# Patient Record
Sex: FEMALE | Race: WHITE | NOT HISPANIC OR LATINO | Employment: FULL TIME | URBAN - METROPOLITAN AREA
[De-identification: names, ages, dates, MRNs, and addresses within clinical notes are randomized per-mention and may not be internally consistent; named-entity substitution may affect disease eponyms.]

---

## 2017-10-17 ENCOUNTER — LAB REQUISITION (OUTPATIENT)
Dept: LAB | Facility: HOSPITAL | Age: 27
End: 2017-10-17
Payer: COMMERCIAL

## 2017-10-17 ENCOUNTER — ALLSCRIPTS OFFICE VISIT (OUTPATIENT)
Dept: OTHER | Facility: OTHER | Age: 27
End: 2017-10-17

## 2017-10-17 DIAGNOSIS — Z11.3 ENCOUNTER FOR SCREENING FOR INFECTIONS WITH PREDOMINANTLY SEXUAL MODE OF TRANSMISSION: ICD-10-CM

## 2017-10-17 PROCEDURE — 87491 CHLMYD TRACH DNA AMP PROBE: CPT | Performed by: OBSTETRICS & GYNECOLOGY

## 2017-10-17 PROCEDURE — 87591 N.GONORRHOEAE DNA AMP PROB: CPT | Performed by: OBSTETRICS & GYNECOLOGY

## 2017-10-17 PROCEDURE — 87624 HPV HI-RISK TYP POOLED RSLT: CPT | Performed by: OBSTETRICS & GYNECOLOGY

## 2017-10-17 PROCEDURE — G0145 SCR C/V CYTO,THINLAYER,RESCR: HCPCS | Performed by: OBSTETRICS & GYNECOLOGY

## 2017-10-18 NOTE — PROGRESS NOTES
Assessment  1  Screening for STDs (sexually transmitted diseases) (V74 5) (Z11 3)   2  Birth control counseling (V25 09) (Z30 09)   3  Mild cervical dysplasia (622 11) (N87 0)   4  ASCUS favor dysplasia (796 9)   5  Encounter for routine gynecological examination with Papanicolaou smear of cervix   (V72 31,V76 2) (Z01 419)    Plan  Encounter for routine gynecological examination with Papanicolaou smear of cervix    · Follow-up visit in 1 year Evaluation and Treatment  Follow-up  Status: Hold For -  Scheduling  Requested for: 97TUB1751   Ordered; For: Encounter for routine gynecological examination with Papanicolaou smear of cervix; Ordered By: Александр Penn Performed:  Due: 60BAP1000  Screening for STDs (sexually transmitted diseases)    · (1) ACUTE HEPATITIS PANEL; Status:Active; Requested TRP:[id number];    Perform:[facility]; Due:17Oct2018; Ordered; For:Screening for STDs (sexually transmitted diseases); Ordered By:Hu Willson;   · (1) CHLAMYDIA/GC AMPLIFIED DNA, PCR; Source:Endocervical; Status:Active -  Retrospective By Protocol Authorization; Requested CTL:[id number];    Perform:[facility]  [address]; Order Comments:RUN OFF PAP; Due:17Oct2018; Last Updated By:Lauri Hoffman; 10/17/2017 8:52:06 AM;Ordered; For:Screening for STDs (sexually transmitted diseases); Ordered By:Hu Willson;   · (1) HIV AG/AB COMBO, 4TH GEN; [Do Not Release]; Status:Active; Requested  OYS:[id number];    Perform:[facility]; Due:17Oct2018; Ordered; For:Screening for STDs (sexually transmitted diseases); Ordered By:Hu Willson;   · (1) RPR; Status:Active; Requested KRV:[id number];    Perform:[facility]; Due:17Oct2018; Ordered; For:Screening for STDs (sexually transmitted diseases); Ordered By:Colette Willson; Discussion/Summary    1   Yearly exam-Pap smear done with reflex HPV, self-breast awareness reviewedMild dysplasia/history of Pap with ascus can't rule out high-grade GERAcolposcopy done November 2016 with ECC and biopsies at 10:00, 3:00, and 12:00  ECC and 3:00 were negative with mild dysplasia noted at 10:00 and 12:00  Pap was done today with disposition as per findings  Contraception-the patient was doing well on levonorgestrel l/ethinyl estradiol 1/20 OCP, but she had difficulty remembering to take it  She was counseled about contraceptive options and given the contraceptive options sheet  She declines OCP at this time and will use condomsHistory of contraception use-patient did take [address] last with 2 yeast infections  Otherpatient status post Gardisil previously  She works in the [employer] testing-patient with new partner for 2 months duration  GC/chlamydia was added to the Pap smear  Laboratory she for HIV, RPR, and hepatitis panel was given to the patient  will follow up in 1 year or as needed  The patient has the current Goals: The patent has the current Barriers:   Patient is able to Self-Care  PATIENT EDUCATION RECORD She was given the following educational materials: Contraceptive options sheet   Possible side effects of new medications were reviewed with the patient/guardian today  The treatment plan was reviewed with the patient/guardian  The patient/guardian understands and agrees with the treatment plan      Chief Complaint  1  Visit For: Preventive General Multisystem Exam    History of Present Illness  HPI: The patient was seen today for yearly exam  Please see assessment plan for details  Review of Systems    Constitutional: No fever, no chills, feels well, no tiredness, no recent weight gain or loss  ENT: no ear ache, no loss of hearing, no nosebleeds or nasal discharge, no sore throat or hoarseness  Cardiovascular: no complaints of slow or fast heart rate, no chest pain, no palpitations, no leg claudication or lower extremity edema     Respiratory: no complaints of shortness of breath, no wheezing, no dyspnea on exertion, no orthopnea or PND    Breasts: no complaints of breast pain, breast lump or nipple discharge  Gastrointestinal: no complaints of abdominal pain, no constipation, no nausea or diarrhea, no vomiting, no bloody stools  Genitourinary: no complaints of dysuria, no incontinence, no pelvic pain, no dysmenorrhea, no vaginal discharge or abnormal vaginal bleeding  Musculoskeletal: no complaints of arthralgia, no myalgia, no joint swelling or stiffness, no limb pain or swelling  Integumentary: no complaints of skin rash or lesion, no itching or dry skin, no skin wounds  Neurological: no complaints of headache, no confusion, no numbness or tingling, no dizziness or fainting  ROS reviewed  Active Problems  1  ASCUS favor dysplasia (796 9)   2  Cervical cancer screening (V76 2) (Z12 4)   3  Encounter for routine gynecological examination with Papanicolaou smear of cervix   (V72 31,V76 2) (Z01 419)   4  History of self breast exam   5  Mild cervical dysplasia (622 11) (N87 0)   6  Osteoarthritis (715 90) (M19 90)   7  Pap smear abnormality of cervix with ASCUS favoring benign (795 01) (R87 610)   8  Screening for STDs (sexually transmitted diseases) (V74 5) (Z11 3)    Past Medical History   · History of Contraception (V25 9) (Z30 9)   · History of  0 (V49 89)   · History of self breast exam   · History of Irregular menstrual cycle (626 4) (N92 6)    The active problems and past medical history were reviewed and updated today  Surgical History   · History of Colposcopy Vagina   · History of Knee Arthroscopy, Lavage And Drainage, With Lateral Release   · History of Repair Patellar Dislocation, Realignment, Muscle Transfer    The surgical history was reviewed and updated today         Family History  Mother    · Family history of cardiac disorder (V17 49) (Z82 49)   · Family history of multiple sclerosis (V17 2) (Z82 0)  Father    · Family history of hypercholesterolemia (V18 19) (Z83 42)  Maternal Grandmother    · Family history of cardiac disorder (V17 49) (Z82 49)   · Family history of myocardial infarction (V17 3) (Z80 55)  Paternal Grandmother    · Family history of Alzheimer's disease (V17 2) (Z82 0)  Maternal Grandfather    · Family history of cardiac disorder (V17 49) (Z82 49)   · Family history of myocardial infarction (V17 3) (Z82 49)  Paternal Grandfather    · Family history of cardiac disorder (V17 49) (Z82 49)   · Family history of myocardial infarction (V17 3) (Z82 49)   · Family history of Pacemaker Placement  Maternal Cousin    · Family history of malignant neoplasm of breast (V16 3) (Z80 3)    The family history was reviewed and updated today  Social History   · Alcohol use   · Always uses seat belt   · Caffeine use (V49 89) (F15 90)   · Condom   · Denied: History of    · Never a smoker   · No drug use   · [employer]   · Single  The social history was reviewed and updated today  The social history was reviewed and is unchanged  Current Meds   1  Levonorgestrel-Ethinyl Estrad 0 1-20 MG-MCG Oral Tablet; One po daily; Therapy: 89JPM4524 to (Evaluate:90Kud1452)  Requested for: 53KOY2564; Last   Rx:2016 Ordered   2  Multi-Vitamin Gummies CHEW;   Therapy: (Recorded:2016) to Recorded   3  Vitamin C TABS; Therapy: (Recorded:2015) to Recorded    Allergies  1  No Known Drug Allergies    Vitals   Recorded: 11DSL0928 54:59LL   Systolic 988, LUE, Sitting   Diastolic 64, LUE, Sitting   Height 5 ft 10 in   Weight 162 lb    BMI Calculated 23 24   BSA Calculated 1 91   LMP 2017     Physical Exam    Constitutional   General appearance: No acute distress, well appearing and well nourished  Neck   Neck: Normal, supple, trachea midline, no masses  Thyroid: Normal, no thyromegaly  Genitourinary   External genitalia: Normal and no lesions appreciated  Vagina: Normal, no lesions or dryness appreciated      Urethra: Normal     Urethral meatus: Normal     Bladder: Normal, soft, non-tender and no prolapse or masses appreciated  Cervix: Normal, no palpable masses  -- Without lesions or discharge or cervicitis  No Cervical motion tenderness  Light bleeding with Pap test    Uterus: Normal, non-tender, not enlarged, and no palpable masses  -- Mid position, normal size, nontender, without mass  Adnexa/parametria: Normal, non-tender and no fullness or masses appreciated  Anus, perineum, and rectum: Abnormal   Patient declined, external normal   Chest   Breasts: Normal and no dimpling or skin changes noted  Abdomen   Abdomen: Normal, non-tender, and no organomegaly noted  Liver and spleen: No hepatomegaly or splenomegaly  Examination for hernias: No hernias appreciated  Lymphatic   Palpation of lymph nodes in neck, axillae, groin and/or other locations: No lymphadenopathy or masses noted  Skin   Skin and subcutaneous tissue: Normal skin turgor and no rashes      Palpation of skin and subcutaneous tissue: Normal     Psychiatric   Orientation to person, place, and time: Normal     Mood and affect: Normal        Signatures   Electronically signed by : ZAIN French ; Oct 17 2017  8:57AM EST                       (Author)

## 2017-10-19 LAB
CHLAMYDIA DNA CVX QL NAA+PROBE: NORMAL
N GONORRHOEA DNA GENITAL QL NAA+PROBE: NORMAL

## 2017-10-20 ENCOUNTER — GENERIC CONVERSION - ENCOUNTER (OUTPATIENT)
Dept: OTHER | Facility: OTHER | Age: 27
End: 2017-10-20

## 2017-10-25 ENCOUNTER — GENERIC CONVERSION - ENCOUNTER (OUTPATIENT)
Dept: OTHER | Facility: OTHER | Age: 27
End: 2017-10-25

## 2017-11-01 LAB — HPV RRNA GENITAL QL NAA+PROBE: ABNORMAL

## 2017-11-02 LAB
LAB AP GYN PRIMARY INTERPRETATION: NORMAL
LAB AP LMP: NORMAL
Lab: NORMAL
PATH INTERP SPEC-IMP: NORMAL

## 2017-11-06 ENCOUNTER — GENERIC CONVERSION - ENCOUNTER (OUTPATIENT)
Dept: OTHER | Facility: OTHER | Age: 27
End: 2017-11-06

## 2017-11-28 ENCOUNTER — LAB REQUISITION (OUTPATIENT)
Dept: LAB | Facility: HOSPITAL | Age: 27
End: 2017-11-28
Payer: COMMERCIAL

## 2017-11-28 ENCOUNTER — ALLSCRIPTS OFFICE VISIT (OUTPATIENT)
Dept: OTHER | Facility: OTHER | Age: 27
End: 2017-11-28

## 2017-11-28 DIAGNOSIS — R87.610 ATYPICAL SQUAMOUS CELLS OF UNDETERMINED SIGNIFICANCE ON CYTOLOGIC SMEAR OF CERVIX (ASC-US): ICD-10-CM

## 2017-11-28 PROCEDURE — 88305 TISSUE EXAM BY PATHOLOGIST: CPT | Performed by: OBSTETRICS & GYNECOLOGY

## 2017-11-29 NOTE — PROCEDURES
Assessment    1  ASCUS with positive high risk HPV cervical (795 01,795 05) (R87 610,R87 810)   2  Mild cervical dysplasia (622 11) (N87 0)    Plan  ASCUS with positive high risk HPV cervical    · Follow-up visit in 2 weeks Evaluation and Treatment  Follow-up  Status: Hold For -Scheduling  Requested for: 13TBH4063   Ordered;ASCUS with positive high risk HPV cervical; Ordered By: Mau Andrade Performed:  Due: 43IUE3342    Discussion/Summary  Discussion Summary:   1  Pap with ASCUS positive HPV/history of Mild dysplasia/history of Pap with ascus can't rule out high-grade SILâcolposcopy done November 2016 with ECC and biopsies at 10:00, 3:00, and 12:00  ECC and 3:00 were negative with mild dysplasia noted at 10:00 and 12:00  Repeat Pap November 2017 with ASCUS positive HPV  Patient had colposcopy with ECC and biopsies at 12 o'clock, 3 o'clock, and 6 o'clock  She tolerated the procedure well  Pelvic rest was recommended for the next 5-7 days  She will follow up in 2 weeks time to discuss results and plan treatment accordingly  Contraception-the patient was doing well on levonorgestrel l/ethinyl estradiol 1/20 OCP, but she had difficulty remembering to take it  She was counseled about contraceptive options and given the contraceptive options sheet  She declines OCP at this time and will use condomsHistory of contraception use-patient did take [address] last with 2 yeast infections  Otherâpatient status post Gardisil previously  She works in the [employer]-patient with new partner for 3 months duration  GC/chlamydia was added to the Pap smear and was negative with negative HIV, RPR, hepatitis panel as well  She will follow up in 2 weeks for office visit with me or as needed  GYN Discussion and Summary:          Abnormal Pap--  Medication SE Review and Pt Understands Tx: Possible side effects of new medications were reviewed with the patient/guardian today   The treatment plan was reviewed with the patient/guardian  The patient/guardian understands and agrees with the treatment plan   Goals and Barriers: The patient has the current Goals: The patent has the current Barriers:   Patient's Capacity to Self-Care: Patient is able to Self-Care  Active Problems    1  ASCUS favor dysplasia (796 9)   2  Birth control counseling (V25 09) (Z30 09)   3  Cervical cancer screening (V76 2) (Z12 4)   4  Encounter for routine gynecological examination with Papanicolaou smear of cervix (V72 31,V76 2) (Z01 419)   5  History of self breast exam   6  Mild cervical dysplasia (622 11) (N87 0)   7  Osteoarthritis (715 90) (M19 90)   8  Pap smear abnormality of cervix with ASCUS favoring benign (795 01) (R87 610)   9  Screening for STDs (sexually transmitted diseases) (V74 5) (Z11 3)    Current Meds    1  Multi-Vitamin Gummies CHEW; Therapy: (Recorded:11Oct2016) to Recorded    2  Levonorgestrel-Ethinyl Estrad 0 1-20 MG-MCG Oral Tablet; One po daily; Therapy: 55ILP8323 to (Evaluate:51Tme1189)  Requested for: 21MYU6615; Last Rx:11Oct2016 Ordered    3  Vitamin C TABS; Therapy: (Recorded:08Oct2015) to Recorded    Allergies    1  No Known Drug Allergies    Physical Exam   Constitutional  General appearance: No acute distress, well appearing and well nourished  Genitourinary  External genitalia: Normal and no lesions appreciated  Vagina: Normal, no lesions or dryness appreciated  Urethra: Normal    Urethral meatus: Normal    Bladder: Normal, soft, non-tender and no prolapse or masses appreciated  Cervix: Normal, no palpable masses  Abdomen  Abdomen: Normal, non-tender, and no organomegaly noted  Liver and spleen: No hepatomegaly or splenomegaly  Examination for hernias: No hernias appreciated  Psychiatric  Orientation to person, place, and time: Normal    Mood and affect: Normal        Procedure   Procedure: colposcopy    Indication: atypical squamous cells of undetermined significance-- and-- PCR positive for high risk HPV  Risks, benefits and alternatives were discussed with the patient  We discussed possible complications, including infection,-- bleeding-- and-- allergic reaction  Written consent was obtained prior to the procedure  Procedure Note:   A cervical Pap smear was not performed  The squamocolumnar junction was fully visualized  Observation without staining showed no abnormalities  After bathing the cervix in acetic acid, evaluation showed acetowhite changes at Twelve o'clock, 3 o'clock, 6 o'clock o'clock  Staining with Lugol's solution showed no staining at Twelve o'clock, 3 o'clock, 6 o'clock o'clock  Vaginal Vault: no abnormalities seen  Vulva: no abnormalities seen  Cervical Biopsy: Three biopsies taken of the cervix  -- the biopsies were taken at Twelve o'clock, 3 o'clock, 6 o'clock o'clock  Endocervical curettage was performed  Hemostasis was obtained with Monsel's solution  Patient Status: the patient tolerated the procedure well  Complications: there were no complications        Signatures   Electronically signed by : ZAIN Mead ; Nov 28 2017  9:49AM EST                       (Author)

## 2017-12-12 ENCOUNTER — GENERIC CONVERSION - ENCOUNTER (OUTPATIENT)
Dept: OTHER | Facility: OTHER | Age: 27
End: 2017-12-12

## 2018-01-10 NOTE — MISCELLANEOUS
Message   Recorded as Task   Date: 11/01/2016 03:17 PM, Created By: [id number]   Task Name: Care Coordination   Assigned To: Hu Willson   Regarding Patient: Janae Cruz, Status: Active   CommentCarmelo Gist - 01 Nov 2016 3:17 PM     TASK CREATED  we received a call from pts PCP asking about pt having HPV testing done    Now we had ordered her pap with reflex if ASCUS    they did not do the HPV testing    DO you want it done? Davidson Juárez   I thought that they should have done it with a ASCUS result?   I can add it if you want it    Hu Willson - 22 Nov 2016 12:24 PM     TASK REPLIED TO: Previously Assigned To Hu Willson  I am not sure why HPV was not done  However, her Pap read ASCUS, can't rule out high-grade dysplasia  With that result, colposcopy is recommended nonetheless   Annalise Conrad - 02 Nov 2016 12:44 PM     TASK REPLIED TO: Previously Assigned To [address]  they said they did not do it because of high grade reading    I can add it regardless    The lab said I could  Carlyn Pascal - 03 Nov 2016 12:32 PM     TASK REPLIED TO: Previously Assigned To [employer], the patient still needs colposcopy either way  Annalise Conrad - 03 Nov 2016 1:25 PM     TASK REPLIED TO: Previously Assigned To [address]  yes she has an appt for the 11th    not sure if HPV will be back, but I did order it  Jennifer Parry        Active Problems    1  Cervical cancer screening (V76 2) (Z12 4)   2  Contraception (V25 9) (Z30 9)   3  Encounter for routine gynecological examination with Papanicolaou smear of cervix   (V72 31,V76 2) (Z01 419)   4  History of self breast exam   5  Osteoarthritis (715 90) (M19 90)   6  Pap smear abnormality of cervix with ASCUS favoring benign (795 01) (R87 610)   7  Screening for STDs (sexually transmitted diseases) (V74 5) (Z11 3)    Current Meds   1  Levonorgestrel-Ethinyl Estrad 0 1-20 MG-MCG Oral Tablet; One po daily;    Therapy: 62UMS2110 to (Evaluate:44Rlz6669)  Requested for: 98UFA0186; Last   Rx:11Oct2016 Ordered   2  Multi-Vitamin Gummies CHEW;   Therapy: (Recorded:11Oct2016) to Recorded   3  Vitamin C TABS; Therapy: (Recorded:08Oct2015) to Recorded    Allergies    1  No Known Drug Allergies    Plan  Pap smear abnormality of cervix with ASCUS favoring benign    · (1) HPV HIGH RISK; Status:Active - Retrospective Authorization;  Requested  for:03Nov2016;     Signatures   Electronically signed by : Carlos Bell, ; Nov  3 2016  1:26PM EST                       (Author)

## 2018-01-11 NOTE — MISCELLANEOUS
Message   Recorded as Task   Date: 10/25/2017 03:00 PM, Created By: Rohit Bustos   Task Name: Miscellaneous   Assigned To: Lucio Wood   Regarding Patient: Carlos Butler, Status: In Progress   Comment:    Hu Willson - 25 Oct 2017 3:00 PM     TASK CREATED  GC/chlamydia negative, HIV, RPR, and hepatitis panel negative  Please inform the patient  Pap smear is still pending  Annalise Conrad - 25 Oct 2017 3:31 PM     TASK IN PROGRESS        Active Problems    1  ASCUS favor dysplasia (796 9)   2  Birth control counseling (V25 09) (Z30 09)   3  Cervical cancer screening (V76 2) (Z12 4)   4  Encounter for routine gynecological examination with Papanicolaou smear of cervix   (V72 31,V76 2) (Z01 419)   5  History of self breast exam   6  Mild cervical dysplasia (622 11) (N87 0)   7  Osteoarthritis (715 90) (M19 90)   8  Pap smear abnormality of cervix with ASCUS favoring benign (795 01) (R87 610)   9  Screening for STDs (sexually transmitted diseases) (V74 5) (Z11 3)    Current Meds   1  Levonorgestrel-Ethinyl Estrad 0 1-20 MG-MCG Oral Tablet; One po daily; Therapy: 78KVU1451 to (Evaluate:43Taj3135)  Requested for: 60KSV6644; Last   Rx:11Oct2016 Ordered   2  Multi-Vitamin Gummies CHEW;   Therapy: (Recorded:11Oct2016) to Recorded   3  Vitamin C TABS; Therapy: (Recorded:08Oct2015) to Recorded    Allergies    1   No Known Drug Allergies    Signatures   Electronically signed by : Jensen Wesley, ; Oct 25 2017  4:56PM EST                       (Author)

## 2018-01-11 NOTE — MISCELLANEOUS
Message   Recorded as Task   Date: 10/25/2017 03:00 PM, Created By: Nellie Coleman   Task Name: Miscellaneous   Assigned To: Timmy Kern   Regarding Patient: Eva Abrams, Status: In Progress   Comment:    AnamikaHu - 25 Oct 2017 3:00 PM     TASK CREATED  GC/chlamydia negative, HIV, RPR, and hepatitis panel negative  Please inform the patient  Pap smear is still pending  Annalise Conrad - 25 Oct 2017 3:31 PM     TASK IN PROGRESS   Annalise Conrad - 25 Oct 2017 3:49 PM     TASK EDITED  pt informed        Active Problems    1  ASCUS favor dysplasia (796 9)   2  Birth control counseling (V25 09) (Z30 09)   3  Cervical cancer screening (V76 2) (Z12 4)   4  Encounter for routine gynecological examination with Papanicolaou smear of cervix   (V72 31,V76 2) (Z01 419)   5  History of self breast exam   6  Mild cervical dysplasia (622 11) (N87 0)   7  Osteoarthritis (715 90) (M19 90)   8  Pap smear abnormality of cervix with ASCUS favoring benign (795 01) (R87 610)   9  Screening for STDs (sexually transmitted diseases) (V74 5) (Z11 3)    Current Meds   1  Levonorgestrel-Ethinyl Estrad 0 1-20 MG-MCG Oral Tablet; One po daily; Therapy: 17QMV9136 to (Evaluate:63Pfn9494)  Requested for: 31NGI9495; Last   Rx:11Oct2016 Ordered   2  Multi-Vitamin Gummies CHEW;   Therapy: (Recorded:11Oct2016) to Recorded   3  Vitamin C TABS; Therapy: (Recorded:08Oct2015) to Recorded    Allergies    1   No Known Drug Allergies    Signatures   Electronically signed by : Wendy Mallory, ; Oct 25 2017  3:49PM EST                       (Author)

## 2018-01-12 VITALS
SYSTOLIC BLOOD PRESSURE: 110 MMHG | WEIGHT: 162 LBS | BODY MASS INDEX: 23.19 KG/M2 | DIASTOLIC BLOOD PRESSURE: 64 MMHG | HEIGHT: 70 IN

## 2018-01-12 NOTE — MISCELLANEOUS
Message   Recorded as Task   Date: 11/03/2017 09:01 AM, Created By: Leveda Schlatter   Task Name: Miscellaneous   Assigned To: Maryanne Velasquez   Regarding Patient: Nicole Cox, Status: Active   CommentBurnard Bari - 03 Nov 2017 9:01 AM     TASK CREATED  Pap with ASCUS, positive HPV  Recommend colposcopy   Annalise Conrad - 06 Nov 2017 10:22 AM     TASK EDITED  pt scheduled appt           Active Problems    1  ASCUS favor dysplasia (796 9)   2  Birth control counseling (V25 09) (Z30 09)   3  Cervical cancer screening (V76 2) (Z12 4)   4  Encounter for routine gynecological examination with Papanicolaou smear of cervix   (V72 31,V76 2) (Z01 419)   5  History of self breast exam   6  Mild cervical dysplasia (622 11) (N87 0)   7  Osteoarthritis (715 90) (M19 90)   8  Pap smear abnormality of cervix with ASCUS favoring benign (795 01) (R87 610)   9  Screening for STDs (sexually transmitted diseases) (V74 5) (Z11 3)    Current Meds   1  Levonorgestrel-Ethinyl Estrad 0 1-20 MG-MCG Oral Tablet; One po daily; Therapy: 02KPQ5613 to (Evaluate:76Xkq5718)  Requested for: 63LFY9038; Last   Rx:11Oct2016 Ordered   2  Multi-Vitamin Gummies CHEW;   Therapy: (Recorded:11Oct2016) to Recorded   3  Vitamin C TABS; Therapy: (Recorded:08Oct2015) to Recorded    Allergies    1   No Known Drug Allergies    Signatures   Electronically signed by : Rajesh Arrington, ; Nov 6 2017 10:23AM EST                       (Author)

## 2018-01-13 VITALS
DIASTOLIC BLOOD PRESSURE: 70 MMHG | HEIGHT: 70 IN | WEIGHT: 162 LBS | SYSTOLIC BLOOD PRESSURE: 120 MMHG | BODY MASS INDEX: 23.19 KG/M2

## 2018-01-14 NOTE — MISCELLANEOUS
Message   Recorded as Task   Date: 10/20/2016 07:38 AM, Created By: April Drummond   Task Name: Go to Result   Assigned To: Joyce Fuentes   Regarding Patient: Naz Peng, Status: Active   CommentCorrinne Rist - 20 Oct 2016 7:38 AM     TASK CREATED  GC/Chlamydia negative, please inform the patient        Active Problems    1  Cervical cancer screening (V76 2) (Z12 4)   2  Contraception (V25 9) (Z30 9)   3  Encounter for routine gynecological examination with Papanicolaou smear of cervix   (V72 31,V76 2) (Z01 419)   4  History of self breast exam   5  Osteoarthritis (715 90) (M19 90)   6  Screening for STDs (sexually transmitted diseases) (V74 5) (Z11 3)    Current Meds   1  Levonorgestrel-Ethinyl Estrad 0 1-20 MG-MCG Oral Tablet; One po daily; Therapy: 87FMY7203 to (Evaluate:71Glj9323)  Requested for: 49CVO0416; Last   Rx:11Oct2016 Ordered   2  Multi-Vitamin Gummies CHEW;   Therapy: (Recorded:11Oct2016) to Recorded   3  Vitamin C TABS; Therapy: (Recorded:08Oct2015) to Recorded    Allergies    1   No Known Drug Allergies    Signatures   Electronically signed by : Hannah Stoddard, ; Oct 20 2016 10:12AM EST                       (Author)

## 2018-01-15 NOTE — MISCELLANEOUS
Message   Recorded as Task   Date: 10/27/2016 12:34 PM, Created By: Wil Yanez   Task Name: Go to Result   Assigned To: Mandy Moore   Regarding Patient: Sandeep Mancilla, Status: Active   CommentPaola Blew - 27 Oct 2016 12:34 PM     TASK CREATED  Pap with ASCUS, can't exclude high-grade GERA  Recommend colposcopy        Active Problems    1  Cervical cancer screening (V76 2) (Z12 4)   2  Contraception (V25 9) (Z30 9)   3  Encounter for routine gynecological examination with Papanicolaou smear of cervix   (V72 31,V76 2) (Z01 419)   4  History of self breast exam   5  Osteoarthritis (715 90) (M19 90)   6  Screening for STDs (sexually transmitted diseases) (V74 5) (Z11 3)    Current Meds   1  Levonorgestrel-Ethinyl Estrad 0 1-20 MG-MCG Oral Tablet; One po daily; Therapy: 44MRC9793 to (Evaluate:95Hyw7481)  Requested for: 82CTP9144; Last   Rx:11Oct2016 Ordered   2  Multi-Vitamin Gummies CHEW;   Therapy: (Recorded:11Oct2016) to Recorded   3  Vitamin C TABS; Therapy: (Recorded:08Oct2015) to Recorded    Allergies    1   No Known Drug Allergies    Signatures   Electronically signed by : Anastacia Thurman, ; Oct 28 2016  9:47AM EST                       (Author)

## 2018-01-18 NOTE — MISCELLANEOUS
Message   Recorded as Task   Date: 10/20/2017 09:58 AM, Created By: Shania Evans   Task Name: Miscellaneous   Assigned To: Liz Negrete   Regarding Patient: Eden Arreaga, Status: Active   CommentMargudianata Lily - 20 Oct 2017 9:58 AM     TASK CREATED  GC/ chlamydia negative, please inform the patient  Still awaiting Pap smear results   Annalise Conrad - 20 Oct 2017 11:30 AM     TASK EDITED  LM with pts normal results        Active Problems    1  ASCUS favor dysplasia (796 9)   2  Birth control counseling (V25 09) (Z30 09)   3  Cervical cancer screening (V76 2) (Z12 4)   4  Encounter for routine gynecological examination with Papanicolaou smear of cervix   (V72 31,V76 2) (Z01 419)   5  History of self breast exam   6  Mild cervical dysplasia (622 11) (N87 0)   7  Osteoarthritis (715 90) (M19 90)   8  Pap smear abnormality of cervix with ASCUS favoring benign (795 01) (R87 610)   9  Screening for STDs (sexually transmitted diseases) (V74 5) (Z11 3)    Current Meds   1  Levonorgestrel-Ethinyl Estrad 0 1-20 MG-MCG Oral Tablet; One po daily; Therapy: 43HNQ1510 to (Evaluate:63Prd7230)  Requested for: 77UPN5642; Last   Rx:11Oct2016 Ordered   2  Multi-Vitamin Gummies CHEW;   Therapy: (Recorded:11Oct2016) to Recorded   3  Vitamin C TABS; Therapy: (Recorded:08Oct2015) to Recorded    Allergies    1   No Known Drug Allergies    Signatures   Electronically signed by : Louisa Barrett, ; Oct 20 2017 11:31AM EST                       (Author)

## 2018-01-24 VITALS
HEIGHT: 70 IN | BODY MASS INDEX: 23.64 KG/M2 | SYSTOLIC BLOOD PRESSURE: 108 MMHG | DIASTOLIC BLOOD PRESSURE: 68 MMHG | WEIGHT: 165.13 LBS

## 2018-12-18 ENCOUNTER — ANNUAL EXAM (OUTPATIENT)
Dept: OBGYN CLINIC | Facility: CLINIC | Age: 28
End: 2018-12-18
Payer: COMMERCIAL

## 2018-12-18 VITALS
DIASTOLIC BLOOD PRESSURE: 72 MMHG | BODY MASS INDEX: 23.28 KG/M2 | HEIGHT: 70 IN | WEIGHT: 162.6 LBS | SYSTOLIC BLOOD PRESSURE: 90 MMHG

## 2018-12-18 DIAGNOSIS — Z12.4 ENCOUNTER FOR PAP SMEAR OF CERVIX WITH HPV DNA COTESTING: ICD-10-CM

## 2018-12-18 DIAGNOSIS — Z01.419 ENCOUNTER FOR GYNECOLOGICAL EXAMINATION WITHOUT ABNORMAL FINDING: Primary | ICD-10-CM

## 2018-12-18 PROBLEM — R87.810 ASCUS WITH POSITIVE HIGH RISK HPV CERVICAL: Status: ACTIVE | Noted: 2017-11-28

## 2018-12-18 PROBLEM — R87.610 ASCUS WITH POSITIVE HIGH RISK HPV CERVICAL: Status: ACTIVE | Noted: 2017-11-28

## 2018-12-18 PROCEDURE — 87624 HPV HI-RISK TYP POOLED RSLT: CPT | Performed by: OBSTETRICS & GYNECOLOGY

## 2018-12-18 PROCEDURE — G0145 SCR C/V CYTO,THINLAYER,RESCR: HCPCS | Performed by: OBSTETRICS & GYNECOLOGY

## 2018-12-18 PROCEDURE — 99395 PREV VISIT EST AGE 18-39: CPT | Performed by: OBSTETRICS & GYNECOLOGY

## 2018-12-18 RX ORDER — LEVONORGESTREL AND ETHINYL ESTRADIOL 0.1-0.02MG
KIT ORAL DAILY
COMMUNITY
Start: 2015-10-08 | End: 2018-12-18

## 2018-12-21 LAB
HPV HR 12 DNA CVX QL NAA+PROBE: NEGATIVE
HPV16 DNA CVX QL NAA+PROBE: NEGATIVE
HPV18 DNA CVX QL NAA+PROBE: NEGATIVE

## 2018-12-24 LAB
LAB AP GYN PRIMARY INTERPRETATION: NORMAL
Lab: NORMAL

## 2019-04-01 NOTE — PROGRESS NOTES
CC:  Annual exam    HPI: Autumn Session presents for routine annual visit  She is doing well with no complaints or concerns at this time  She did have atypical cells with non 16/18 high risk HPV last year which was colposcopically negative  Currently she is using condoms for contraception and she is content with this  Past Medical History:  Past Medical History:   Diagnosis Date    Osteoarthritis        Past Surgical History:  History reviewed  No pertinent surgical history  Past OB/Gyn History:  Menstrual cycles every 28 days, with 2-4 days of normal bleeding  Denies any history of sexually transmitted infection  No history of abnormal pap smears  Her last pap smear as discussed under HPI  ALLERGIES: No Known Allergies    MEDS:   Current Outpatient Prescriptions:     Ascorbic Acid (VITAMIN C PO)    Multiple Vitamin (MULTI-VITAMIN PO)    Family History:  History reviewed  No pertinent family history  Social History:  Social History     Social History    Marital status: Single     Spouse name: N/A    Number of children: N/A    Years of education: N/A     Occupational History    Not on file  Social History Main Topics    Smoking status: Never Smoker    Smokeless tobacco: Never Used    Alcohol use Yes      Comment: socially    Drug use: No    Sexual activity: Yes     Partners: Male     Birth control/ protection: Condom Male     Other Topics Concern    Not on file     Social History Narrative    No narrative on file         Review of Systems:  Gen:   Denies fatigue, chills, nausea, vomiting, fever  Skin: No rashes or discolorations of any concern  RESP: Denies SOB, no cough  CV: Denies chest pain or palpitations  Breasts: Denies masses, pain, skin changes and nipple discharge  GI: Denies abdominal pain, heartburn, nausea, vomiting, changes in bowel habits  : Denies dysuria, frequency, CVA tenderness, incontinence and hematuria     Genitalia: Denies abnormal vaginal discharge, external lesions, rashes, pelvic pain, pressure, abnormal bleeding  Rectal:  Denies pain, bleeding, hemorrhoids,    Physical Exam:  BP 90/72 (BP Location: Left arm, Patient Position: Sitting, Cuff Size: Standard)   Ht 5' 10" (1 778 m)   Wt 73 8 kg (162 lb 9 6 oz)   LMP 12/01/2018 (Exact Date)   BMI 23 33 kg/m²    Gen: The patient was alert and oriented x3, pleasant well-appearing female in no acute distress  Neck:  Unremarkable, no lymphadenopathy, no thyromegaly, or tenderness  Breasts: Symmetric  No dominant, discrete, fixed  or suspicious masses are noted  No skin or nipple changes  No palpable axillary nodes  No supraclavicular adenopathy  Abd:  Soft, nontender, nondistended, no masses or organomegaly  Back:  No CVA tenderness, no tenderness to palpation along spine  Pelvic  Normal appearing external female genitalia, no visible lesions, no rashes  Vagina is free of discharge, normal vaginal epithelium, no abnormal  lesions, no evidence of prolapse anteriorly or posteriorly  Normal appearing cervix, mobile and nontender  A thin prep pap smear was obtained  Uterus is normal size, mobile and, nontender  No palpable adnexal masses or tenderness  No anoperineal lesions  Skin:  No concerning lesions  Extremeties: No edema      Assessment & Plan:   1  Routine annual exam      RTO one year orPRN  2    History of ASCUS with high risk HPV, await this year's Pap smear with markers  Yes

## 2019-08-06 ENCOUNTER — TRANSCRIBE ORDERS (OUTPATIENT)
Dept: URGENT CARE | Age: 29
End: 2019-08-06

## 2019-08-06 ENCOUNTER — APPOINTMENT (OUTPATIENT)
Dept: URGENT CARE | Age: 29
End: 2019-08-06

## 2019-08-06 ENCOUNTER — APPOINTMENT (OUTPATIENT)
Dept: LAB | Age: 29
End: 2019-08-06

## 2019-08-06 DIAGNOSIS — Z02.1 PHYSICAL EXAM, PRE-EMPLOYMENT: ICD-10-CM

## 2019-08-06 DIAGNOSIS — Z02.1 PHYSICAL EXAM, PRE-EMPLOYMENT: Primary | ICD-10-CM

## 2019-08-06 PROCEDURE — 86480 TB TEST CELL IMMUN MEASURE: CPT

## 2019-08-06 PROCEDURE — 36415 COLL VENOUS BLD VENIPUNCTURE: CPT

## 2019-08-08 LAB
GAMMA INTERFERON BACKGROUND BLD IA-ACNC: 0.05 IU/ML
M TB IFN-G BLD-IMP: NEGATIVE
M TB IFN-G CD4+ BCKGRND COR BLD-ACNC: -0.01 IU/ML
M TB IFN-G CD4+ BCKGRND COR BLD-ACNC: 0 IU/ML
MITOGEN IGNF BCKGRD COR BLD-ACNC: >10 IU/ML

## 2019-08-12 ENCOUNTER — OFFICE VISIT (OUTPATIENT)
Dept: OBGYN CLINIC | Facility: CLINIC | Age: 29
End: 2019-08-12
Payer: COMMERCIAL

## 2019-08-12 VITALS
HEIGHT: 71 IN | WEIGHT: 187 LBS | SYSTOLIC BLOOD PRESSURE: 114 MMHG | DIASTOLIC BLOOD PRESSURE: 70 MMHG | BODY MASS INDEX: 26.18 KG/M2

## 2019-08-12 DIAGNOSIS — Z20.2 POSSIBLE EXPOSURE TO STD: Primary | ICD-10-CM

## 2019-08-12 LAB — HCV AB SER-ACNC: NEGATIVE

## 2019-08-12 PROCEDURE — 87491 CHLMYD TRACH DNA AMP PROBE: CPT | Performed by: OBSTETRICS & GYNECOLOGY

## 2019-08-12 PROCEDURE — 87591 N.GONORRHOEAE DNA AMP PROB: CPT | Performed by: OBSTETRICS & GYNECOLOGY

## 2019-08-12 PROCEDURE — 99213 OFFICE O/P EST LOW 20 MIN: CPT | Performed by: OBSTETRICS & GYNECOLOGY

## 2019-08-12 NOTE — PROGRESS NOTES
Assessment/Plan:      Diagnoses and all orders for this visit:    Possible exposure to STD  -     HIV 1/2 w/ Reflex (Multispot); Future  -     RPR; Future  -     Hepatitis panel, acute; Future          Subjective:     Patient ID: Maggie Corona is a 34 y o  female  Maebelle Fisherman is here for precautionary STD testing since she found out that her significant other was cheating on her  She currently denies any type of signs or symptoms that would be associated with STDs  Review of Systems   Constitutional: Negative  Negative for chills and fever  Respiratory: Negative  Cardiovascular: Negative  Gastrointestinal: Negative  Negative for abdominal pain, blood in stool, constipation, diarrhea and nausea  Genitourinary: Negative  Negative for difficulty urinating, dysuria, flank pain, hematuria and urgency  Skin: Negative  Negative for rash and wound  Neurological: Negative  Objective:     Physical Exam    The vulvar structures are within normal limits and free of any erythema, swelling, lesions or rashes  The vagina is of normal length and caliber without any tenderness or unusual deformities or lesions  Cervix is mobile and nontender an a culture for Chlamydia and gonorrhea is obtained  Uterus is anteverted, normal size, shape and consistency  and is nontender  No adnexal masses or tenderness bilaterally

## 2019-08-13 LAB
C TRACH DNA SPEC QL NAA+PROBE: NEGATIVE
N GONORRHOEA DNA SPEC QL NAA+PROBE: NEGATIVE

## 2019-08-22 ENCOUNTER — TELEPHONE (OUTPATIENT)
Dept: OBGYN CLINIC | Facility: CLINIC | Age: 29
End: 2019-08-22

## 2019-08-22 NOTE — TELEPHONE ENCOUNTER
----- Message from Arjun Haile MD sent at 8/19/2019  8:37 AM EDT -----   Please let Evangelina Quintana know her blood tests were negative

## 2020-06-02 ENCOUNTER — OFFICE VISIT (OUTPATIENT)
Dept: OBGYN CLINIC | Facility: CLINIC | Age: 30
End: 2020-06-02
Payer: COMMERCIAL

## 2020-06-02 VITALS — DIASTOLIC BLOOD PRESSURE: 72 MMHG | SYSTOLIC BLOOD PRESSURE: 124 MMHG | BODY MASS INDEX: 22.26 KG/M2 | WEIGHT: 159.6 LBS

## 2020-06-02 DIAGNOSIS — N76.4 VULVAR ABSCESS: Primary | ICD-10-CM

## 2020-06-02 PROCEDURE — 99213 OFFICE O/P EST LOW 20 MIN: CPT | Performed by: OBSTETRICS & GYNECOLOGY

## 2020-06-11 ENCOUNTER — ANNUAL EXAM (OUTPATIENT)
Dept: OBGYN CLINIC | Facility: CLINIC | Age: 30
End: 2020-06-11
Payer: COMMERCIAL

## 2020-06-11 VITALS — SYSTOLIC BLOOD PRESSURE: 120 MMHG | DIASTOLIC BLOOD PRESSURE: 80 MMHG | BODY MASS INDEX: 22.87 KG/M2 | WEIGHT: 164 LBS

## 2020-06-11 DIAGNOSIS — Z12.4 SCREENING FOR CERVICAL CANCER: ICD-10-CM

## 2020-06-11 DIAGNOSIS — Z11.3 SCREENING FOR STD (SEXUALLY TRANSMITTED DISEASE): ICD-10-CM

## 2020-06-11 DIAGNOSIS — Z01.419 ENCOUNTER FOR GYNECOLOGICAL EXAMINATION WITHOUT ABNORMAL FINDING: Primary | ICD-10-CM

## 2020-06-11 PROCEDURE — 99395 PREV VISIT EST AGE 18-39: CPT | Performed by: OBSTETRICS & GYNECOLOGY

## 2020-06-11 PROCEDURE — 87491 CHLMYD TRACH DNA AMP PROBE: CPT | Performed by: OBSTETRICS & GYNECOLOGY

## 2020-06-11 PROCEDURE — 87591 N.GONORRHOEAE DNA AMP PROB: CPT | Performed by: OBSTETRICS & GYNECOLOGY

## 2020-06-11 PROCEDURE — G0145 SCR C/V CYTO,THINLAYER,RESCR: HCPCS | Performed by: OBSTETRICS & GYNECOLOGY

## 2020-06-15 LAB
C TRACH DNA SPEC QL NAA+PROBE: NEGATIVE
N GONORRHOEA DNA SPEC QL NAA+PROBE: NEGATIVE

## 2020-06-22 LAB
LAB AP GYN PRIMARY INTERPRETATION: NORMAL
Lab: NORMAL
PATH INTERP SPEC-IMP: NORMAL

## 2020-12-22 ENCOUNTER — IMMUNIZATIONS (OUTPATIENT)
Dept: FAMILY MEDICINE CLINIC | Facility: HOSPITAL | Age: 30
End: 2020-12-22

## 2020-12-22 DIAGNOSIS — Z23 ENCOUNTER FOR IMMUNIZATION: ICD-10-CM

## 2020-12-22 PROCEDURE — 91300 SARS-COV-2 / COVID-19 MRNA VACCINE (PFIZER-BIONTECH) 30 MCG: CPT | Performed by: EMERGENCY MEDICINE

## 2020-12-22 PROCEDURE — 0001A SARS-COV-2 / COVID-19 MRNA VACCINE (PFIZER-BIONTECH) 30 MCG: CPT | Performed by: EMERGENCY MEDICINE

## 2020-12-31 ENCOUNTER — OFFICE VISIT (OUTPATIENT)
Dept: OBGYN CLINIC | Facility: HOSPITAL | Age: 30
End: 2020-12-31
Payer: COMMERCIAL

## 2020-12-31 ENCOUNTER — HOSPITAL ENCOUNTER (OUTPATIENT)
Dept: RADIOLOGY | Facility: HOSPITAL | Age: 30
Discharge: HOME | End: 2020-12-31
Payer: COMMERCIAL

## 2020-12-31 VITALS — HEART RATE: 108 BPM | DIASTOLIC BLOOD PRESSURE: 85 MMHG | SYSTOLIC BLOOD PRESSURE: 133 MMHG

## 2020-12-31 DIAGNOSIS — M25.561 ACUTE PAIN OF RIGHT KNEE: Primary | ICD-10-CM

## 2020-12-31 DIAGNOSIS — S83.004A DISLOCATION OF RIGHT PATELLA, INITIAL ENCOUNTER: ICD-10-CM

## 2020-12-31 DIAGNOSIS — M25.361 PATELLAR INSTABILITY OF RIGHT KNEE: ICD-10-CM

## 2020-12-31 DIAGNOSIS — M25.561 ACUTE PAIN OF RIGHT KNEE: ICD-10-CM

## 2020-12-31 PROCEDURE — 20610 DRAIN/INJ JOINT/BURSA W/O US: CPT | Performed by: PHYSICIAN ASSISTANT

## 2020-12-31 PROCEDURE — 73560 X-RAY EXAM OF KNEE 1 OR 2: CPT

## 2020-12-31 PROCEDURE — 99203 OFFICE O/P NEW LOW 30 MIN: CPT | Performed by: PHYSICIAN ASSISTANT

## 2020-12-31 RX ORDER — TRIAMCINOLONE ACETONIDE 40 MG/ML
80 INJECTION, SUSPENSION INTRA-ARTICULAR; INTRAMUSCULAR
Status: COMPLETED | OUTPATIENT
Start: 2020-12-31 | End: 2020-12-31

## 2020-12-31 RX ORDER — LIDOCAINE HYDROCHLORIDE 10 MG/ML
2 INJECTION, SOLUTION INFILTRATION; PERINEURAL
Status: COMPLETED | OUTPATIENT
Start: 2020-12-31 | End: 2020-12-31

## 2020-12-31 RX ORDER — BUPIVACAINE HYDROCHLORIDE 5 MG/ML
2 INJECTION, SOLUTION EPIDURAL; INTRACAUDAL
Status: COMPLETED | OUTPATIENT
Start: 2020-12-31 | End: 2020-12-31

## 2020-12-31 RX ADMIN — LIDOCAINE HYDROCHLORIDE 2 ML: 10 INJECTION, SOLUTION INFILTRATION; PERINEURAL at 10:58

## 2020-12-31 RX ADMIN — TRIAMCINOLONE ACETONIDE 80 MG: 40 INJECTION, SUSPENSION INTRA-ARTICULAR; INTRAMUSCULAR at 10:58

## 2020-12-31 RX ADMIN — BUPIVACAINE HYDROCHLORIDE 2 ML: 5 INJECTION, SOLUTION EPIDURAL; INTRACAUDAL at 10:58

## 2021-01-02 ENCOUNTER — HOSPITAL ENCOUNTER (OUTPATIENT)
Dept: MRI IMAGING | Facility: HOSPITAL | Age: 31
Discharge: HOME | End: 2021-01-02
Payer: COMMERCIAL

## 2021-01-02 DIAGNOSIS — M25.361 PATELLAR INSTABILITY OF RIGHT KNEE: ICD-10-CM

## 2021-01-02 DIAGNOSIS — M25.561 ACUTE PAIN OF RIGHT KNEE: ICD-10-CM

## 2021-01-02 DIAGNOSIS — S83.004A DISLOCATION OF RIGHT PATELLA, INITIAL ENCOUNTER: ICD-10-CM

## 2021-01-02 PROCEDURE — 73721 MRI JNT OF LWR EXTRE W/O DYE: CPT

## 2021-01-02 PROCEDURE — G1004 CDSM NDSC: HCPCS

## 2021-01-07 ENCOUNTER — OFFICE VISIT (OUTPATIENT)
Dept: OBGYN CLINIC | Facility: MEDICAL CENTER | Age: 31
End: 2021-01-07
Payer: COMMERCIAL

## 2021-01-07 VITALS — WEIGHT: 164 LBS | BODY MASS INDEX: 22.96 KG/M2 | HEIGHT: 71 IN

## 2021-01-07 DIAGNOSIS — M25.362 PATELLAR INSTABILITY OF LEFT KNEE: ICD-10-CM

## 2021-01-07 DIAGNOSIS — M25.361 PATELLAR INSTABILITY OF RIGHT KNEE: Primary | ICD-10-CM

## 2021-01-07 DIAGNOSIS — M23.8X1 CHONDRAL DEFECT OF RIGHT PATELLA: ICD-10-CM

## 2021-01-07 PROCEDURE — 99243 OFF/OP CNSLTJ NEW/EST LOW 30: CPT | Performed by: ORTHOPAEDIC SURGERY

## 2021-01-07 NOTE — PROGRESS NOTES
Ortho Sports Medicine Knee New Patient Visit     Assesment:   31 y o  female right knee patellar instability, with chondral defect on inferior lateral aspect of patella measuring [id number]     Left knee patella instability    Plan:    Conservative treatment:    Ice to knee for 20 minutes at least 1-2 times daily  PT for ROM/strengthening to knee, hip and core  Continue with J- brace as needed  Work note written    Imaging: All imaging from today was reviewed by myself and explained to the patient  Injection:    No Injection planned at this time  Surgery:     No surgery is recommended at this point, continue with conservative treatment plan as noted  May consider surgery in the future if instability persists after 6-8 weeks to address patella instability and chondral defect  Follow up:    No follow-ups on file  Chief Complaint   Patient presents with    Right Knee - Pain     History of Present Illness: The patient is a 31 y o  female whose occupation is unknown, referred to me by, Elbert Magana PA-C, seen in clinic for consultation of right knee pain  Pain is located anterior, posterior, lateral, medial   The patient has pain after her patella dislocates she has a lot of pain and she has trouble walking  When the kneecap subluxes she doesn't have as much pain  The pain has been present for 10 years  The patient sustained an injury on 12/27/2020  The mechanism of injury was when the patient soc slipped on the stairs and her right kneecap dislocated she then fell down the stairs and her patella reduced on its own  She then was wearing a T ROM brace locked in extension  The patient has dislocated the patella 3+ times and has relocated it herself  She also notes that subluxations has occurred as well  She notes feelings of instability of the kneecap with twisting motions and remains cautious with those motions       She has dislocated her left knee cap previously and had to reduce it herself as well  On 12/31/2020 she saw Estevan Delgadilloows who aspirated 50 mL of fluid and injected the knee  The pain is characterized as dull  The pain is present with motion weight bearing  Pain is improved by rest, ice and physical therapy  Pain is aggravated by stairs, squatting, weight bearing, running, walking, sitting, standing and pivoting on a planted foot  Symptoms include clicking, catching, popping and swelling  The patient has tried rest, ice, NSAIDS, physical therapy and bracing  Knee Surgical History:  Lateral release, with by Dr Mc Durán at [facility] in 2007    Past Medical, Social and Family History:  Past Medical History:   Diagnosis Date    Abnormal Pap smear of cervix     HPV (human papilloma virus) infection     Osteoarthritis     Varicella      No past surgical history on file  No Known Allergies  Current Outpatient Medications on File Prior to Visit   Medication Sig Dispense Refill    Ascorbic Acid (VITAMIN C PO) Take by mouth      Multiple Vitamin (MULTI-VITAMIN PO) Take by mouth       No current facility-administered medications on file prior to visit        Social History     Socioeconomic History    Marital status: Single     Spouse name: Not on file    Number of children: Not on file    Years of education: Not on file    Highest education level: Not on file   Occupational History    Not on file   Social Needs    Financial resource strain: Not on file    Food insecurity     Worry: Not on file     Inability: Not on file    Transportation needs     Medical: Not on file     Non-medical: Not on file   Tobacco Use    Smoking status: Never Smoker    Smokeless tobacco: Never Used   Substance and Sexual Activity    Alcohol use: Yes     Comment: socially    Drug use: No    Sexual activity: Yes     Partners: Male     Birth control/protection: Condom Male   Lifestyle    Physical activity     Days per week: Not on file     Minutes per session: Not on file    Stress: Not on file   Relationships    Social connections     Talks on phone: Not on file     Gets together: Not on file     Attends Ca[employer] service: Not on file     Active member of club or organization: Not on file     Attends meetings of clubs or organizations: Not on file     Relationship status: Not on file    Intimate partner violence     Fear of current or ex partner: Not on file     Emotionally abused: Not on file     Physically abused: Not on file     Forced sexual activity: Not on file   Other Topics Concern    Not on file   Social History Narrative    Not on file     I have reviewed the past medical, surgical, social and family history, medications and allergies as documented in the EMR  Review of systems: ROS is negative other than that noted in the HPI  Constitutional: Negative for fatigue and fever  HENT: Negative for sore throat  Respiratory: Negative for shortness of breath  Cardiovascular: Negative for chest pain  Gastrointestinal: Negative for abdominal pain  Endocrine: Negative for cold intolerance and heat intolerance  Genitourinary: Negative for flank pain  Musculoskeletal: Negative for back pain  Skin: Negative for rash  Allergic/Immunologic: Negative for immunocompromised state  Neurological: Negative for dizziness  Psychiatric/Behavioral: Negative for agitation  Physical Exam:    Height 5' 11" (1 803 m), weight 74 4 kg (164 lb)  General/Constitutional: NAD, well developed, well nourished  HENT: Normocephalic, atraumatic  CV: Intact distal pulses, regular rate  Resp: No respiratory distress or labored breathing  Lymphatic: No lymphadenopathy palpated  Neuro: Alert and Oriented x 3, no focal deficits  Psych: Normal mood, normal affect, normal judgement, normal behavior  Skin: Warm, dry, no rashes, no erythema    Knee Exam (focused):              RIGHT LEFT   ROM:   0-130 0-130   Palpation: Effusion mild negative     MJL tenderness Negative Negative     LJL tenderness Negative Negative   Meniscus: Vee Negative Negative    Apley's Compression Negative Negative   Instability: Varus stable stable     Valgus stable stable   Special Tests: Lachman Negative Negative     Posterior drawer Negative Negative     Anterior drawer Negative Negative     Pivot shift not tested not tested     Dial not tested not tested   Patella: Palpation no tenderness no tenderness     Mobility 3/4 3/4     Apprehension Negative Negative   Other: Single leg 1/4 squat not tested not tested      LE NV Exam: +2 DP/PT pulses bilaterally  Sensation intact to light touch L2-S1 bilaterally     Bilateral hip ROM demonstrates no pain actively or passively    No calf tenderness to palpation bilaterally    Knee Imaging    X-rays of the right knee were reviewed, which demonstrate and minimal medial joint osteoarthritis   I have reviewed the radiology report and agree with their impression  MRI of the right knee were reviewed, which demonstrates lateral tilt of the patella and a TTTG of 18 75mm and a chondral defect on the inferolateral aspect of the patella that is 11x19 mm  I have reviewed the radiology report and agree with their impression      Scribe Attestation    I,:  Issac Schilder am acting as a scribe while in the presence of the attending physician :       I,:  Karyn Lyn DO personally performed the services described in this documentation    as scribed in my presence :

## 2021-01-07 NOTE — LETTER
January 7, 2021     Patient: Dayday Ortiz   YOB: 1990   Date of Visit: 1/7/2021       To Whom it May Concern:    Dayday Ortiz is under my professional care  She was seen in my office on 1/7/2021  She should not lift, push or pull anything above 40 lbs  If you have any questions or concerns, please don't hesitate to call           Sincerely,          Corrie Fowler,         CC: Dayday Ortiz

## 2021-01-10 ENCOUNTER — IMMUNIZATIONS (OUTPATIENT)
Dept: FAMILY MEDICINE CLINIC | Facility: HOSPITAL | Age: 31
End: 2021-01-10

## 2021-01-10 DIAGNOSIS — Z23 ENCOUNTER FOR IMMUNIZATION: ICD-10-CM

## 2021-01-10 PROCEDURE — 0002A SARS-COV-2 / COVID-19 MRNA VACCINE (PFIZER-BIONTECH) 30 MCG: CPT

## 2021-01-10 PROCEDURE — 91300 SARS-COV-2 / COVID-19 MRNA VACCINE (PFIZER-BIONTECH) 30 MCG: CPT

## 2021-01-12 ENCOUNTER — EVALUATION (OUTPATIENT)
Dept: PHYSICAL THERAPY | Facility: MEDICAL CENTER | Age: 31
End: 2021-01-12
Payer: COMMERCIAL

## 2021-01-12 DIAGNOSIS — M25.362 PATELLAR INSTABILITY OF LEFT KNEE: ICD-10-CM

## 2021-01-12 DIAGNOSIS — M23.8X1 CHONDRAL DEFECT OF RIGHT PATELLA: ICD-10-CM

## 2021-01-12 DIAGNOSIS — M25.361 PATELLAR INSTABILITY OF RIGHT KNEE: Primary | ICD-10-CM

## 2021-01-12 PROCEDURE — 97161 PT EVAL LOW COMPLEX 20 MIN: CPT | Performed by: PHYSICAL THERAPIST

## 2021-01-12 PROCEDURE — 97110 THERAPEUTIC EXERCISES: CPT | Performed by: PHYSICAL THERAPIST

## 2021-01-12 NOTE — PROGRESS NOTES
PT Evaluation     Today's date: 2021  Patient name: Angel Burr  : 1990  MRN: [id number]  Referring provider: Jenny East DO  Dx:   Encounter Diagnosis     ICD-10-CM    1  Patellar instability of right knee  M25 361 Ambulatory referral to Physical Therapy   2  Chondral defect of right patella  M23 8X1 Ambulatory referral to Physical Therapy   3  Patellar instability of left knee  M25 362 Ambulatory referral to Physical Therapy                  Assessment  Assessment details: Angel Burr is a pleasant 31 y o  female who presents with bilateral recurrent patellar dislocations (most recent R knee on 20)  Her R knee has dislocated 3-4 times since , and she has a prior hx of R MCL repair and lateral release from   She also has dislocated her L knee 1-2 times  The patient's greatest concerns are how to prevent recurrence of dislocation  No further referral is necessary at this time based on examination results  The primary movement impairment diagnosis is bilateral quadriceps weakness, resulting in pathoanatomical symptoms of patellar instability and limiting her ability to ascend/descend stairs  In addition, multiplanar hip girdle weakness contributes to a lack of dynamic valgus control and resultant compensatory hypermobility of bilateral knees when squatting during ADL and job duties  Patient would benefit from skilled PT services to address the listed impairments to facilitate a return to PLOF   Thank you for the referral      Primary Impairments:  1) Bilateral quadriceps weakness--> addressing with progressive strengthening and neuromuscular re-education  2) Bilateral hip girdle weakness--> addressing with progressive strengthening and neuromuscular re-education        Impairments: abnormal muscle firing, abnormal movement, activity intolerance, impaired physical strength, lacks appropriate home exercise program, pain with function and poor body mechanics  Functional limitations: squatting, standing, stair climbing, kneeling  Symptom irritability: lowBarriers to therapy: Work schedule  Understanding of Dx/Px/POC: excellent   Prognosis: good  Prognosis details: Positive prognostic indicators include positive attitude toward recovery  Negative prognostic indicators include chronicity of symptoms  Goals  Patient will be independent with home exercise program    Patient will increase strength of bilateral quadriceps to 5/5 to be able to stand for job duties  Patient will increase strength of bilateral hip musculature to 4+/5 in all planes to improve dynamic valgus control when squatting  Patient will be able to ascend/descend stairs  Patient will be able to kneel as necessary for job duties  Patient will be able to manage symptoms independently  Plan  Plan details: Prognosis above is given PT services 2x/week tapering to 1x/week over the next 2 months and home program adherence  Patient would benefit from: skilled physical therapy  Referral necessary: No  Planned modality interventions: thermotherapy: hydrocollator packs and cryotherapy  Planned therapy interventions: activity modification, joint mobilization, manual therapy, motor coordination training, neuromuscular re-education, patient education, therapeutic activities, therapeutic exercise, graded activity, home exercise program, massage, Lin taping, balance, strengthening, stretching, functional ROM exercises and flexibility  Frequency: 2x week  Duration in weeks: 6  Treatment plan discussed with: patient        Subjective Evaluation    History of Present Illness  Mechanism of injury: dislocation  Mechanism of injury: This is a 32 yo female presenting with recurrent bilateral patellar dislocations  Her most recent dislocation was a R patellar dislocation 12/27/20 when she fell down her stairs  She is unsure if her knee dislocated first and caused the fall or if the impact with the ground caused the dislocation   She reports that she was able to reduce it on her own  She then wore her sister's knee immobilizer for a few days  On 20, she received an aspiration and injection in her R knee  She reports that her knee felt significantly better after this  She reports that her R knee has dislocated 3-4 times previously with her 1st R knee dislocation being in   She had a R MCL repair and lateral release at this time in   She also has a hx of L patellar dislocations (1 or 2)  Most of her dislocations occur when she is coming down the stairs  X-ray of her R knee on 20 showed minimal medial joint OA, and an MRI of her R knee on 21 showed lateral tilt of patella and full-thickness chondral defect on the inferolateral patella  She is currently wearing a J brace as needed, 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            Recurrent probem    Quality of life: excellent    Pain  Current pain ratin  At best pain ratin  At worst pain ratin  Location: R medial knee, R posterior knee  Quality: throbbing  Relieving factors: ice and medications (motrin)  Aggravating factors: standing and stair climbing    Social Support    Employment status: working (nurse)    Diagnostic Tests  X-ray: abnormal (R knee- 20- minimal medial joint OA)  MRI studies: abnormal (R knee- 21- lateral tilt of patella and full-thickness chondral defect inferolateral patella [id number])  Treatments  Previous treatment: injection treatment, immobilization and physical therapy  Patient Goals  Patient goals for therapy: decreased pain and increased strength  Patient goal: to prevent recurrence of dislocations        Objective     Active Range of Motion   Left Knee   Flexion: 140 degrees   Extension: 0 degrees     Right Knee   Flexion: 140 degrees   Extension: 0 degrees     Mobility   Patellar Mobility:   Left Knee   Hypermobile: left medial, left lateral, left superior and left inferior      Right Knee   Hypermobile: medial, lateral, superior and inferior     Strength/Myotome Testing     Left Hip   Planes of Motion   Extension: 3+  Abduction: 3+    Right Hip   Planes of Motion   Extension: 3  Abduction: 3    Left Knee   Flexion: 4+  Extension: 4  Quadriceps contraction: fair    Right Knee   Flexion: 4+  Extension: 4-  Quadriceps contraction: fair      Flowsheet Rows      Most Recent Value   PT/OT G-Codes   Current Score  63   Projected Score  84             Precautions: hx of recurrent bilateral patellar dislocations (most recent R patellar dislocation: 12/27/20), chondral defect R inferolateral patella [id number], hx of R MCL repair and lateral release 2007      Manuals 1/12                                                                Neuro Re-Ed             Sit to stand                                                                                           Ther Ex             QS 30x5: ea            4-way SLR 2x10 FLX ABD/EXT/ADD           Supine clams 20x5: RTB            Supine hip ADD isometrics             Sidelying clams NV            Bridges NV            Prone heel pushes             Prone glute raises             Standing hip ABD             Ther Activity                                       Gait Training                                       Modalities             CP R knee             CP L knee

## 2021-01-19 ENCOUNTER — OFFICE VISIT (OUTPATIENT)
Dept: PHYSICAL THERAPY | Facility: MEDICAL CENTER | Age: 31
End: 2021-01-19
Payer: COMMERCIAL

## 2021-01-19 DIAGNOSIS — M23.8X1 CHONDRAL DEFECT OF RIGHT PATELLA: ICD-10-CM

## 2021-01-19 DIAGNOSIS — M25.361 PATELLAR INSTABILITY OF RIGHT KNEE: Primary | ICD-10-CM

## 2021-01-19 DIAGNOSIS — M25.362 PATELLAR INSTABILITY OF LEFT KNEE: ICD-10-CM

## 2021-01-19 PROCEDURE — 97110 THERAPEUTIC EXERCISES: CPT | Performed by: PHYSICAL THERAPIST

## 2021-01-19 NOTE — PROGRESS NOTES
Daily Note     Today's date: 2021  Patient name: Gloria Mccallum  : 1990  MRN: [id number]  Referring provider: Derik Molina DO  Dx:   Encounter Diagnosis     ICD-10-CM    1  Patellar instability of right knee  M25 361    2  Chondral defect of right patella  M23 8X1    3  Patellar instability of left knee  M25 362                   Subjective: Patient reports that her knees have been feeling pretty good since last session  She has not had any dislocations or subluxations  She reports that performing the straight leg raises on her L side is getting easier, but she is still having difficulty on her R side  Objective: See treatment diary below      Assessment: Patient demonstrated improved bilateral quad activation today  She continues to demonstrate difficulty performing FLX SLR on R LE  However, this has improved since last session  Added 4-way SLR to further improve multiplanar quad and hip stability to decrease compensatory strain on knees during ADL  Patient was educated in updated illustrated HEP for quad and hip girdle strengthening  This will be patient's last visit until  due to work schedule  She would benefit from continued PT to progress CKC strengthening and prevent recurrence of patellar subluxation  Plan: Continue per plan of care        Precautions: hx of recurrent bilateral patellar dislocations (most recent R patellar dislocation: 20), chondral defect R inferolateral patella [id number], hx of R MCL repair and lateral release       Manuals                                                                Neuro Re-Ed             Sit to stand                                                                                           Ther Ex             QS 30x5: ea 30x5: ea           4-way SLR 2x10 FLX 3x10 FLX, 2x10 ABD/ADD/EXT           Supine clams 20x5: RTB 30x5: blue           Supine hip ADD isometrics  30           Sidelying clams NV NV           Bridges NV 30 Prone heel pushes             Prone glute raises             Standing hip ABD  NV           Ther Activity                                       Gait Training                                       Modalities             CP R knee             CP L knee

## 2021-02-05 ENCOUNTER — OFFICE VISIT (OUTPATIENT)
Dept: PHYSICAL THERAPY | Facility: MEDICAL CENTER | Age: 31
End: 2021-02-05
Payer: COMMERCIAL

## 2021-02-05 DIAGNOSIS — M25.362 PATELLAR INSTABILITY OF LEFT KNEE: ICD-10-CM

## 2021-02-05 DIAGNOSIS — M23.8X1 CHONDRAL DEFECT OF RIGHT PATELLA: ICD-10-CM

## 2021-02-05 DIAGNOSIS — M25.361 PATELLAR INSTABILITY OF RIGHT KNEE: Primary | ICD-10-CM

## 2021-02-05 PROCEDURE — 97110 THERAPEUTIC EXERCISES: CPT | Performed by: PHYSICAL THERAPIST

## 2021-02-05 NOTE — PROGRESS NOTES
Daily Note     Today's date: 2021  Patient name: Tiana Bianchi  : 1990  MRN: [id number]  Referring provider: Breanna Alvarenga DO  Dx:   Encounter Diagnosis     ICD-10-CM    1  Patellar instability of right knee  M25 361    2  Chondral defect of right patella  M23 8X1    3  Patellar instability of left knee  M25 362                   Subjective: Patient reports that she is fatigued from working multiple consecutive days, but she reports that her knees are feeling pretty good  She reports that she has not been wearing her brace when working  Objective: See treatment diary below      Assessment: This is patient's first visit since  due to work schedule  Focused session on R LE due to R knee being current primary source of functional limitation  Able to initiate recumbent bike [place] without pain  In addition, able to progress resistance for 4-way SLR, which demonstrates an improvement in multiplanar quad and hip strength  Patient would benefit from continued PT to further progress CKC strengthening to improve knee mechanics when ascending/descending stairs and performing job duties  Plan: Continue per plan of care        Precautions: hx of recurrent bilateral patellar dislocations (most recent R patellar dislocation: 20), chondral defect R inferolateral patella [id number], hx of R MCL repair and lateral release 2007       Manuals                                                               Neuro Re-Ed             Sit to stand                                                                                           Ther Ex             Rec bike [place]   10'          QS 30x5: ea 30x5: ea 30x5: R          4-way SLR 2x10 FLX 3x10 FLX, 2x10 ABD/ADD/EXT 3x10 1# R          Supine clams 20x5: RTB 30x5: blue 30x5: black          Supine hip ADD isometrics  30 30          Sidelying clams NV NV           Bridges NV 30 30          Prone heel pushes             Prone glute raises             Standing hip ABD  NV 2x10 ea           Ther Activity                                       Gait Training                                       Modalities             CP R knee             CP L knee

## 2021-02-09 ENCOUNTER — OFFICE VISIT (OUTPATIENT)
Dept: PHYSICAL THERAPY | Facility: MEDICAL CENTER | Age: 31
End: 2021-02-09
Payer: COMMERCIAL

## 2021-02-09 DIAGNOSIS — M25.361 PATELLAR INSTABILITY OF RIGHT KNEE: Primary | ICD-10-CM

## 2021-02-09 DIAGNOSIS — M25.362 PATELLAR INSTABILITY OF LEFT KNEE: ICD-10-CM

## 2021-02-09 DIAGNOSIS — M23.8X1 CHONDRAL DEFECT OF RIGHT PATELLA: ICD-10-CM

## 2021-02-09 PROCEDURE — 97110 THERAPEUTIC EXERCISES: CPT | Performed by: PHYSICAL THERAPIST

## 2021-02-09 NOTE — PROGRESS NOTES
Daily Note     Today's date: 2021  Patient name: Italo Valdovinos  : 1990  MRN: [id number]  Referring provider: Sergio Montes DO  Dx:   Encounter Diagnosis     ICD-10-CM    1  Patellar instability of right knee  M25 361    2  Chondral defect of right patella  M23 8X1    3  Patellar instability of left knee  M25 362                   Subjective: Patient reports that she is continuing to feel better with decreased knee pain  She reports that she was able to shovel on  and work on Monday without any limitation from her knees  She also feels that her strength is improving relatively quickly  Objective: See treatment diary below      Assessment: Continued to focus treatment on R LE due to R knee being current primary source of functional limitation  Able to progress resistance 4-way SLR, which demonstrates good progress toward goals  Also progressed bridges to add ABD/ADD to improve multiplanar hip stability and decrease compensatory strain on knees when squatting  Able to progress both reps and resistance for CKC hip ABD strengthening with patient demonstrating fatigue but no pain  Patient would benefit from continued PT to further progress strengthening to prevent recurrence of dislocation  Plan: Continue per plan of care        Precautions: hx of recurrent bilateral patellar dislocations (most recent R patellar dislocation: 20), chondral defect R inferolateral patella [id number], hx of R MCL repair and lateral release        Manuals                                                              Neuro Re-Ed             Sit to stand                                                                                           Ther Ex             Rec bike [place]   10' 10'         QS 30x5: ea 30x5: ea 30x5: R HEP         4-way SLR 2x10 FLX 3x10 FLX, 2x10 ABD/ADD/EXT 3x10 1# R 2x10 ea 2# R         Supine clams 20x5: RTB 30x5: blue 30x5: black 30 GTB + bridge         Supine hip ADD isometrics  30 30 30 + bridge         Sidelying clams NV NV  NV         Bridges NV 30 30          Prone heel pushes             Prone glute raises             Standing hip ABD  NV 2x10 ea  3x10 ea 1#         Ther Activity                                       Gait Training                                       Modalities             CP R knee             CP L knee

## 2021-02-12 ENCOUNTER — OFFICE VISIT (OUTPATIENT)
Dept: PHYSICAL THERAPY | Facility: MEDICAL CENTER | Age: 31
End: 2021-02-12
Payer: COMMERCIAL

## 2021-02-12 DIAGNOSIS — M25.361 PATELLAR INSTABILITY OF RIGHT KNEE: Primary | ICD-10-CM

## 2021-02-12 DIAGNOSIS — M25.362 PATELLAR INSTABILITY OF LEFT KNEE: ICD-10-CM

## 2021-02-12 DIAGNOSIS — M23.8X1 CHONDRAL DEFECT OF RIGHT PATELLA: ICD-10-CM

## 2021-02-12 PROCEDURE — 97110 THERAPEUTIC EXERCISES: CPT | Performed by: PHYSICAL THERAPIST

## 2021-02-12 NOTE — PROGRESS NOTES
Daily Note     Today's date: 2021  Patient name: Italo Valdovinos  : 1990  MRN: [id number]  Referring provider: Sergio Montes DO  Dx:   Encounter Diagnosis     ICD-10-CM    1  Patellar instability of right knee  M25 361    2  Chondral defect of right patella  M23 8X1    3  Patellar instability of left knee  M25 362                   Subjective: Patient reports that her knees are feeling good, but she is very tired due to just working a 12-hour night shift  Objective: See treatment diary below      Assessment: Continued to focus interventions on R LE due to R knee being current primary source of limitation  Decreased resistance for 4-way SLR due to fatigue today  Added sidelying clams to improve hip ABD strength with cueing provided to encourage proper gluteus medius activation  Patient is demonstrating appropriate progress toward her goals and would benefit from continued PT to further improve LE strength to improve ability to climb stairs and participate in recreational activities  Plan: Continue per plan of care        Precautions: hx of recurrent bilateral patellar dislocations (most recent R patellar dislocation: 20), chondral defect R inferolateral patella [id number], hx of R MCL repair and lateral release        Manuals                                                             Neuro Re-Ed             Sit to stand                                                                                           Ther Ex             Rec bike [place]   10' 10' 10'        QS 30x5: ea 30x5: ea 30x5: R HEP HEP        4-way SLR 2x10 FLX 3x10 FLX, 2x10 ABD/ADD/EXT 3x10 1# R 2x10 ea 2# R 3x10 1# R        Supine clams 20x5: RTB 30x5: blue 30x5: black 30 GTB + bridge 30 GTB + bridge        Supine hip ADD isometrics  30 30 30 + bridge 30 + bridge        Sidelying clams NV NV  NV 2x10 ea        Bridges NV 30 30          Prone heel pushes             Prone glute raises Standing hip ABD  NV 2x10 ea  3x10 ea 1# 3x10 ea 1#        Ther Activity                                       Gait Training                                       Modalities             CP R knee             CP L knee

## 2021-02-16 ENCOUNTER — APPOINTMENT (OUTPATIENT)
Dept: PHYSICAL THERAPY | Facility: MEDICAL CENTER | Age: 31
End: 2021-02-16
Payer: COMMERCIAL

## 2021-02-19 ENCOUNTER — APPOINTMENT (OUTPATIENT)
Dept: PHYSICAL THERAPY | Facility: MEDICAL CENTER | Age: 31
End: 2021-02-19
Payer: COMMERCIAL

## 2021-02-23 ENCOUNTER — OFFICE VISIT (OUTPATIENT)
Dept: PHYSICAL THERAPY | Facility: MEDICAL CENTER | Age: 31
End: 2021-02-23
Payer: COMMERCIAL

## 2021-02-23 DIAGNOSIS — M23.8X1 CHONDRAL DEFECT OF RIGHT PATELLA: ICD-10-CM

## 2021-02-23 DIAGNOSIS — M25.362 PATELLAR INSTABILITY OF LEFT KNEE: ICD-10-CM

## 2021-02-23 DIAGNOSIS — M25.361 PATELLAR INSTABILITY OF RIGHT KNEE: Primary | ICD-10-CM

## 2021-02-23 PROCEDURE — 97110 THERAPEUTIC EXERCISES: CPT | Performed by: PHYSICAL THERAPIST

## 2021-02-23 NOTE — PROGRESS NOTES
Daily Note     Today's date: 2021  Patient name: Everardo Garcia  : 1990  MRN: [id number]  Referring provider: Chaya Miller DO  Dx:   Encounter Diagnosis     ICD-10-CM    1  Patellar instability of right knee  M25 361    2  Chondral defect of right patella  M23 8X1    3  Patellar instability of left knee  M25 362                   Subjective: Patient reports that her knees are feeling better overall, but she had 1 R patellar subluxation when putting her boot on last week  She reports no complete dislocation and no pain in her knee  She has a follow-up with orthopedic surgeon Thursday ()  Objective: See treatment diary below      Assessment: Focused session on R LE due to R knee being current primary source of functional limitation  Patient is continuing to demonstrate steady progress with improving quad and multiplanar hip girdle strength  Able to progress resistance for 4-way SLR, which further demonstrates good progress toward goals  Patient reported no pain throughout session and demonstrated no signs of patellar instability  Patient would benefit from continued PT to further progress CKC proximal strength and quadriceps control to prevent recurrence of patellar subluxation  Plan: Continue per plan of care        Precautions: hx of recurrent bilateral patellar dislocations (most recent R patellar dislocation: 20), chondral defect R inferolateral patella [id number], hx of R MCL repair and lateral release 2007       Manuals                                                            Neuro Re-Ed             Sit to stand                                                                                           Ther Ex             Rec bike [place]   10' 10' 10' 10'       QS 30x5: ea 30x5: ea 30x5: R HEP HEP HEP       4-way SLR 2x10 FLX 3x10 FLX, 2x10 ABD/ADD/EXT 3x10 1# R 2x10 ea 2# R 3x10 1# R 2x10 ea 2# R       Supine clams 20x5: RTB 30x5: blue 30x5: black 30 GTB + bridge 30 GTB + bridge 30 blue + bridge       Supine hip ADD isometrics  30 30 30 + bridge 30 + bridge 30 + bridge       Sidelying clams NV NV  NV 2x10 ea 3x10 ea       Bridges NV 30 30          Prone heel pushes             Prone glute raises             Standing hip ABD  NV 2x10 ea  3x10 ea 1# 3x10 ea 1# 2x10 ea 2#       Ther Activity                                       Gait Training                                       Modalities             CP R knee             CP L knee

## 2021-02-25 ENCOUNTER — OFFICE VISIT (OUTPATIENT)
Dept: PHYSICAL THERAPY | Facility: MEDICAL CENTER | Age: 31
End: 2021-02-25
Payer: COMMERCIAL

## 2021-02-25 ENCOUNTER — OFFICE VISIT (OUTPATIENT)
Dept: OBGYN CLINIC | Facility: MEDICAL CENTER | Age: 31
End: 2021-02-25
Payer: COMMERCIAL

## 2021-02-25 VITALS
HEIGHT: 71 IN | DIASTOLIC BLOOD PRESSURE: 60 MMHG | BODY MASS INDEX: 23.8 KG/M2 | TEMPERATURE: 98.3 F | WEIGHT: 170 LBS | SYSTOLIC BLOOD PRESSURE: 119 MMHG

## 2021-02-25 DIAGNOSIS — M25.361 PATELLAR INSTABILITY OF RIGHT KNEE: Primary | ICD-10-CM

## 2021-02-25 DIAGNOSIS — S83.004A DISLOCATION OF RIGHT PATELLA, INITIAL ENCOUNTER: ICD-10-CM

## 2021-02-25 DIAGNOSIS — M23.8X1 CHONDRAL DEFECT OF RIGHT PATELLA: ICD-10-CM

## 2021-02-25 DIAGNOSIS — M25.362 PATELLAR INSTABILITY OF LEFT KNEE: ICD-10-CM

## 2021-02-25 PROCEDURE — 99213 OFFICE O/P EST LOW 20 MIN: CPT | Performed by: ORTHOPAEDIC SURGERY

## 2021-02-25 PROCEDURE — 97110 THERAPEUTIC EXERCISES: CPT | Performed by: PHYSICAL THERAPIST

## 2021-02-25 NOTE — PROGRESS NOTES
Daily Note     Today's date: 2021  Patient name: Nikhil Garcia  : 1990  MRN: [id number]  Referring provider: Hilary Pierce DO  Dx:   Encounter Diagnosis     ICD-10-CM    1  Patellar instability of right knee  M25 361    2  Chondral defect of right patella  M23 8X1    3  Patellar instability of left knee  M25 362                   Subjective: Patient reports that her knee is continuing to feel good without pain  She reports that she saw her orthopedic surgeon this morning, and she reports that she is not going forward with surgery at this time  Objective: See treatment diary below      Assessment: Cueing provided to prevent EXT lag during SLR  However, she was able to progress reps for 4-way SLR, which demonstrates good progress toward goals  Also progressed reps of sidelying clams and standing hip ABD to improve endurance of hip musculature  Patient would benefit from continued PT to further progress CKC strengthening to return to active lifestyle  Plan: Continue per plan of care        Precautions: hx of recurrent bilateral patellar dislocations (most recent R patellar dislocation: 20), chondral defect R inferolateral patella [id number], hx of R MCL repair and lateral release 2007       Manuals                                                           Neuro Re-Ed             Sit to stand                                                                                           Ther Ex             Rec bike [place]   10' 10' 10' 10' 10'      QS 30x5: ea 30x5: ea 30x5: R HEP HEP HEP HEP      4-way SLR 2x10 FLX 3x10 FLX, 2x10 ABD/ADD/EXT 3x10 1# R 2x10 ea 2# R 3x10 1# R 2x10 ea 2# R 3x10 ea 2# R      Supine clams 20x5: RTB 30x5: blue 30x5: black 30 GTB + bridge 30 GTB + bridge 30 blue + bridge 30 blue + bridge      Supine hip ADD isometrics  30 30 30 + bridge 30 + bridge 30 + bridge 30 + bridge      Sidelying clams NV NV  NV 2x10 ea 3x10 ea 3x15 ea      Bridges NV 30 30          Prone heel pushes             Prone glute raises             Standing hip ABD  NV 2x10 ea  3x10 ea 1# 3x10 ea 1# 2x10 ea 2# 3x10 ea 2#      Ther Activity                                       Gait Training                                       Modalities             CP R knee             CP L knee

## 2021-02-25 NOTE — PROGRESS NOTES
Ortho Sports Medicine Knee Follow Up Visit     Assesment:   31 y o  female right knee patellar instability, with chondral defect on inferior lateral aspect of patella measuring 81x11yr, with improvement     Left knee patella instability    Plan:    Conservative treatment:    Ice to knee for 20 minutes at least 1-2 times daily  PT for ROM/strengthening to knee, hip and core  Let pain guide gradual return activities  Imaging:    No imaging was available for review today  Injection:    No Injection planned at this time  Surgery:     No surgery is recommended at this point, continue with conservative treatment plan as noted  If she decides in the future to have the surgery to address her patella instability, she may return  She wants to continue conservative treatment and will return if this is impacting her quality of living  Follow up:    No follow-ups on file  Chief Complaint   Patient presents with    Right Knee - Follow-up     History of Present Illness: The patient is returns for follow up of right knee patellar instability, with chondral defect on inferior lateral aspect of patella measuring [id number]  And Left knee patella instability  Since the prior visit, She reports some improvement  Pain is located medial      Pain is improved by rest, ice, NSAIDS, physical therapy and bracing  Pain is aggravated by fast twisting motions and when her knee is in a bent position, she states that she can occasionally feel her kneecap sublux  Symptoms include clicking, catching and popping  The patient has tried rest, ice, NSAIDS, physical therapy and bracing  Patient states she is happy with her progress at this time and does not want to consider surgery however she understands that if her kneecap continues dislocation might consider surgery to fix this in the future      Knee Surgical History:  Lateral release, with by Dr Michael Hebert at [facility] in 2007    Past Medical, Social and Family History:  Past Medical History:   Diagnosis Date    Abnormal Pap smear of cervix     HPV (human papilloma virus) infection     Osteoarthritis     Varicella      History reviewed  No pertinent surgical history  No Known Allergies  Current Outpatient Medications on File Prior to Visit   Medication Sig Dispense Refill    Ascorbic Acid (VITAMIN C PO) Take by mouth      Multiple Vitamin (MULTI-VITAMIN PO) Take by mouth       No current facility-administered medications on file prior to visit        Social History     Socioeconomic History    Marital status: Single     Spouse name: Not on file    Number of children: Not on file    Years of education: Not on file    Highest education level: Not on file   Occupational History    Not on file   Social Needs    Financial resource strain: Not on file    Food insecurity     Worry: Not on file     Inability: Not on file    Transportation needs     Medical: Not on file     Non-medical: Not on file   Tobacco Use    Smoking status: Never Smoker    Smokeless tobacco: Never Used   Substance and Sexual Activity    Alcohol use: Yes     Comment: socially    Drug use: No    Sexual activity: Yes     Partners: Male     Birth control/protection: Condom Male   Lifestyle    Physical activity     Days per week: Not on file     Minutes per session: Not on file    Stress: Not on file   Relationships    Social connections     Talks on phone: Not on file     Gets together: Not on file     Attends Bahai service: Not on file     Active member of club or organization: Not on file     Attends meetings of clubs or organizations: Not on file     Relationship status: Not on file    Intimate partner violence     Fear of current or ex partner: Not on file     Emotionally abused: Not on file     Physically abused: Not on file     Forced sexual activity: Not on file   Other Topics Concern    Not on file   Social History Narrative    Not on file     I have reviewed the past medical, surgical, social and family history, medications and allergies as documented in the EMR  Review of systems: ROS is negative other than that noted in the HPI  Constitutional: Negative for fatigue and fever  Physical Exam:    Blood pressure 119/60, temperature 98 3 °F (36 8 °C), height 5' 11" (1 803 m), weight 77 1 kg (170 lb)  General/Constitutional: NAD, well developed, well nourished  HENT: Normocephalic, atraumatic  CV: Intact distal pulses, regular rate  Resp: No respiratory distress or labored breathing  Lymphatic: No lymphadenopathy palpated  Neuro: Alert and Oriented x 3, no focal deficits  Psych: Normal mood, normal affect, normal judgement, normal behavior  Skin: Warm, dry, no rashes, no erythema    Knee Exam (focused): RIGHT LEFT   ROM:   0-130 0-130   Palpation: Effusion negative negative     MJL tenderness Negative Negative     LJL tenderness Negative Negative   Meniscus:  Vee Negative Negative    Apley's Compression Negative Negative   Instability: Varus stable stable     Valgus stable stable   Special Tests: Lachman Negative Negative     Posterior drawer Negative Negative     Anterior drawer Negative Negative     Pivot shift not tested not tested     Dial not tested not tested   Patella: Palpation no tenderness no tenderness     Mobility 3/4 3/4     Apprehension Negative Negative   Other: Single leg 1/4 squat not tested not tested      LE NV Exam: +2 DP/PT pulses bilaterally  Sensation intact to light touch L2-S1 bilaterally    No calf tenderness to palpation bilaterally    Knee Imaging    No imaging was performed today    Scribe Attestation    I,:  Donnie Tilley am acting as a scribe while in the presence of the attending physician :       I,:  Candida Clark DO personally performed the services described in this documentation    as scribed in my presence :

## 2021-02-26 ENCOUNTER — APPOINTMENT (OUTPATIENT)
Dept: PHYSICAL THERAPY | Facility: MEDICAL CENTER | Age: 31
End: 2021-02-26
Payer: COMMERCIAL

## 2021-03-02 ENCOUNTER — OFFICE VISIT (OUTPATIENT)
Dept: PHYSICAL THERAPY | Facility: MEDICAL CENTER | Age: 31
End: 2021-03-02
Payer: COMMERCIAL

## 2021-03-02 DIAGNOSIS — M25.362 PATELLAR INSTABILITY OF LEFT KNEE: ICD-10-CM

## 2021-03-02 DIAGNOSIS — M25.361 PATELLAR INSTABILITY OF RIGHT KNEE: Primary | ICD-10-CM

## 2021-03-02 DIAGNOSIS — M23.8X1 CHONDRAL DEFECT OF RIGHT PATELLA: ICD-10-CM

## 2021-03-02 PROCEDURE — 97110 THERAPEUTIC EXERCISES: CPT | Performed by: PHYSICAL THERAPIST

## 2021-03-02 NOTE — PROGRESS NOTES
Daily Note     Today's date: 3/2/2021  Patient name: Ninfa Dobbs  : 1990  MRN: [id number]  Referring provider: Benji Perdue DO  Dx:   Encounter Diagnosis     ICD-10-CM    1  Patellar instability of right knee  M25 361    2  Chondral defect of right patella  M23 8X1    3  Patellar instability of left knee  M25 362                   Subjective: Patient reports that her knees are continuing to feel better with no significant changes  Objective: See treatment diary below      Assessment: Patient is continuing to demonstrate excellent progress with all hip girdle and quad strengthening and demonstrated good technique with progression of reps during TE  Patient is gaining independence with her HEP  Patient would benefit from continued PT to further progress CKC strengthening to return to recreational activities without recurrence of dislocation  Will re-assess NV for potential d/c to HEP  Plan: Continue per plan of care  Potential d/c NV       Precautions: hx of recurrent bilateral patellar dislocations (most recent R patellar dislocation: 20), chondral defect R inferolateral patella [id number], hx of R MCL repair and lateral release 2007       Manuals 1/12 1/19 2/5 2/9 2/12 2/23 2/25 3/2                                                         Neuro Re-Ed             Sit to stand                                                                                           Ther Ex             Rec bike [place]   10' 10' 10' 10' 10' 10'     QS 30x5: ea 30x5: ea 30x5: R HEP HEP HEP HEP HEP     4-way SLR 2x10 FLX 3x10 FLX, 2x10 ABD/ADD/EXT 3x10 1# R 2x10 ea 2# R 3x10 1# R 2x10 ea 2# R 3x10 ea 2# R 3x15 ea 2# R     Supine clams 20x5: RTB 30x5: blue 30x5: black 30 GTB + bridge 30 GTB + bridge 30 blue + bridge 30 blue + bridge 30 black + bridge     Supine hip ADD isometrics  30 30 30 + bridge 30 + bridge 30 + bridge 30 + bridge 30 + bridge     Sidelying clams NV NV  NV 2x10 ea 3x10 ea 3x15 ea 3x15 ea     Bridges NV 30 30          Prone heel pushes             Prone glute raises             Standing hip ABD  NV 2x10 ea  3x10 ea 1# 3x10 ea 1# 2x10 ea 2# 3x10 ea 2# 3x15 ea 2#     Ther Activity                                       Gait Training                                       Modalities             CP R knee             CP L knee

## 2021-03-05 ENCOUNTER — OFFICE VISIT (OUTPATIENT)
Dept: PHYSICAL THERAPY | Facility: MEDICAL CENTER | Age: 31
End: 2021-03-05
Payer: COMMERCIAL

## 2021-03-05 DIAGNOSIS — M23.8X1 CHONDRAL DEFECT OF RIGHT PATELLA: ICD-10-CM

## 2021-03-05 DIAGNOSIS — M25.362 PATELLAR INSTABILITY OF LEFT KNEE: ICD-10-CM

## 2021-03-05 DIAGNOSIS — M25.361 PATELLAR INSTABILITY OF RIGHT KNEE: Primary | ICD-10-CM

## 2021-03-05 PROCEDURE — 97110 THERAPEUTIC EXERCISES: CPT | Performed by: PHYSICAL THERAPIST

## 2021-03-05 NOTE — PROGRESS NOTES
Progress Note    Today's date: 3/5/2021  Patient name: Liam Linda  : 1990  MRN: [id number]  Referring provider: Divya Holm DO  Dx:   Encounter Diagnosis     ICD-10-CM    1  Patellar instability of right knee  M25 361    2  Chondral defect of right patella  M23 8X1    3  Patellar instability of left knee  M25 362         Addendum from 2021: Patient has been discharged from PT due to inactivity  Subjective: Patient reports that her knees are continuing to feel stronger overall  She reports that she has no significant limitations at home and is able to drive and perform job duties without difficulty  She does state that she had 1 episode of her R knee subluxing when driving yesterday and 1 episode of her L knee subluxing when moving on the couch  However, she notes that she is pleased with progress and is agreeable to decreasing frequency of PT visits to focus on strengthening HEP  Objective: See treatment diary below      Assessment: Patient is continuing to demonstrate good progress and proper technique with all quad and hip girdle strengthening interventions  In addition, frequency of subluxation has drastically reduced since IE  She is independent in a comprehensive HEP to further improve proximal stability and knee strength  Due to independence with HEP and excellent progress toward goals, plan to hold on formal PT for 1 month to work on strengthening HEP  Plan to re-assess, progress HEP, and d/c at follow-up in 1 month if patient continues to progress appropriately  Goals  Patient will be independent with home exercise program - met   Patient will increase strength of bilateral quadriceps to 5/5 to be able to stand for job duties  - in progress (improving)  Patient will increase strength of bilateral hip musculature to 4+/5 in all planes to improve dynamic valgus control when squatting - in progress (improving)  Patient will be able to ascend/descend stairs  - met  Patient will be able to kneel as necessary for job duties  - met  Patient will be able to manage symptoms independently  - in progress     Plan: Follow-up in 1 month for re-assessment and potential d/c to HEP       Precautions: hx of recurrent bilateral patellar dislocations (most recent R patellar dislocation: 12/27/20), chondral defect R inferolateral patella [id number], hx of R MCL repair and lateral release 2007       Manuals 1/12 1/19 2/5 2/9 2/12 2/23 2/25 3/2 3/5                                                        Neuro Re-Ed             Sit to stand                                                                                           Ther Ex             Rec bike [place]   10' 10' 10' 10' 10' 10' 10'    QS 30x5: ea 30x5: ea 30x5: R HEP HEP HEP HEP HEP HEP    4-way SLR 2x10 FLX 3x10 FLX, 2x10 ABD/ADD/EXT 3x10 1# R 2x10 ea 2# R 3x10 1# R 2x10 ea 2# R 3x10 ea 2# R 3x15 ea 2# R 3x15 ea 2# R    Supine clams 20x5: RTB 30x5: blue 30x5: black 30 GTB + bridge 30 GTB + bridge 30 blue + bridge 30 blue + bridge 30 black + bridge 30 black + bridge    Supine hip ADD isometrics  30 30 30 + bridge 30 + bridge 30 + bridge 30 + bridge 30 + bridge 30 + bridge    Sidelying clams NV NV  NV 2x10 ea 3x10 ea 3x15 ea 3x15 ea 3x15 ea    Bridges NV 30 30          Prone heel pushes             Prone glute raises             Standing hip ABD  NV 2x10 ea  3x10 ea 1# 3x10 ea 1# 2x10 ea 2# 3x10 ea 2# 3x15 ea 2# 3x15 ea 2#    Ther Activity                                       Gait Training                                       Modalities             CP R knee             CP L knee

## 2021-04-08 ENCOUNTER — APPOINTMENT (OUTPATIENT)
Dept: LAB | Facility: HOSPITAL | Age: 31
End: 2021-04-08

## 2021-04-08 ENCOUNTER — TRANSCRIBE ORDERS (OUTPATIENT)
Dept: ADMINISTRATIVE | Facility: HOSPITAL | Age: 31
End: 2021-04-08

## 2021-04-08 DIAGNOSIS — Z00.8 HEALTH EXAMINATION IN POPULATION SURVEY: Primary | ICD-10-CM

## 2021-04-08 DIAGNOSIS — Z00.8 HEALTH EXAMINATION IN POPULATION SURVEY: ICD-10-CM

## 2021-04-08 LAB
CHOLEST SERPL-MCNC: 180 MG/DL
EST. AVERAGE GLUCOSE BLD GHB EST-MCNC: 103 MG/DL
HBA1C MFR BLD: 5.2 %
HDLC SERPL-MCNC: 62 MG/DL
LDLC SERPL CALC-MCNC: 105 MG/DL (ref 0–100)
NONHDLC SERPL-MCNC: 118 MG/DL
TRIGL SERPL-MCNC: 63.3 MG/DL

## 2021-04-08 PROCEDURE — 83036 HEMOGLOBIN GLYCOSYLATED A1C: CPT

## 2021-04-08 PROCEDURE — 36415 COLL VENOUS BLD VENIPUNCTURE: CPT

## 2021-04-08 PROCEDURE — 80061 LIPID PANEL: CPT

## 2021-08-23 ENCOUNTER — ANNUAL EXAM (OUTPATIENT)
Dept: OBGYN CLINIC | Facility: CLINIC | Age: 31
End: 2021-08-23
Payer: COMMERCIAL

## 2021-08-23 VITALS
BODY MASS INDEX: 20.86 KG/M2 | DIASTOLIC BLOOD PRESSURE: 78 MMHG | HEIGHT: 72 IN | WEIGHT: 154 LBS | SYSTOLIC BLOOD PRESSURE: 120 MMHG

## 2021-08-23 DIAGNOSIS — Z12.4 CERVICAL CANCER SCREENING: ICD-10-CM

## 2021-08-23 DIAGNOSIS — Z01.419 WOMEN'S ANNUAL ROUTINE GYNECOLOGICAL EXAMINATION: Primary | ICD-10-CM

## 2021-08-23 DIAGNOSIS — Z20.2 POSSIBLE EXPOSURE TO STD: ICD-10-CM

## 2021-08-23 PROCEDURE — 87591 N.GONORRHOEAE DNA AMP PROB: CPT | Performed by: OBSTETRICS & GYNECOLOGY

## 2021-08-23 PROCEDURE — 87491 CHLMYD TRACH DNA AMP PROBE: CPT | Performed by: OBSTETRICS & GYNECOLOGY

## 2021-08-23 PROCEDURE — G0145 SCR C/V CYTO,THINLAYER,RESCR: HCPCS | Performed by: OBSTETRICS & GYNECOLOGY

## 2021-08-23 PROCEDURE — 99395 PREV VISIT EST AGE 18-39: CPT | Performed by: OBSTETRICS & GYNECOLOGY

## 2021-08-23 NOTE — PROGRESS NOTES
Assessment/Plan   Diagnoses and all orders for this visit:    Women's annual routine gynecological examination    Cervical cancer screening  -     Liquid-based pap, screening    Possible exposure to STD  -     Chlamydia/GC amplified DNA by PCR  -     HIV 1/2 Antigen/Antibody (4th Generation) w Reflex [facility]; Future  -     RPR; Future  -     Hepatitis panel, acute; Future    1  Yearly exam-Pap smear done with reflex HPV, self-breast awareness reviewed  2  STD testing-patient with new partner for 2 months time  She uses condoms always  She was encouraged to continue with that  She is interested in testing  GC/chlamydia done today and we will inform her the findings  Laboratory sheet given for HIV, RPR, hepatitis panel  To call with any concerns  3  Contraception-continues with condoms  She was counseled about other options and given the contraceptive options sheet  She has had issues on OCP in the past including blood pressure and vaginal symptoms  She will defer any this at this time  She will call back if interested in any options  4  Previous history mild dysplasia-had colposcopy October 2016 and November 2017 with GIOVANY 1 noted at each time  Pap with HPV negative December 2018 and Pap was negative June 2020  Pap with reflex HPV done today given new partner and this history  Disposition as per findings  5  Other-patient with low hematocrit around 31 by her report  She does donate plasma, this was found during evaluation of that  She to follow-up as per treating doctor  Consider iron, patient taking iron rich foods  Follow-up 1 year for yearly exam or as needed  Subjective   Patient ID: Roseanna Whitmore is a 36 y o  female  Vitals:    08/23/21 1030   BP: 120/78     Patient was seen today for yearly exam   Please see assessment plan for details        The following portions of the patient's history were reviewed and updated as appropriate: allergies, current medications, past family history, past medical history, past social history, past surgical history and problem list   Past Medical History:   Diagnosis Date    Abnormal Pap smear of cervix     HPV (human papilloma virus) infection     Osteoarthritis     Varicella      History reviewed  No pertinent surgical history  OB History    Para Term  AB Living   0 0 0 0 0 0   SAB TAB Ectopic Multiple Live Births   0 0 0 0 0       Current Outpatient Medications:     Ascorbic Acid (VITAMIN C PO), Take by mouth, Disp: , Rfl:     Multiple Vitamin (MULTI-VITAMIN PO), Take by mouth, Disp: , Rfl:   No Known Allergies  Social History     Socioeconomic History    Marital status: Single     Spouse name: None    Number of children: None    Years of education: None    Highest education level: None   Occupational History    None   Tobacco Use    Smoking status: Never Smoker    Smokeless tobacco: Never Used   Vaping Use    Vaping Use: Never used   Substance and Sexual Activity    Alcohol use: Yes     Comment: socially    Drug use: No    Sexual activity: Yes     Partners: Male     Birth control/protection: Condom Male   Other Topics Concern    None   Social History Narrative    None     Social Determinants of Health     Financial Resource Strain:     Difficulty of Paying Living Expenses:    Food Insecurity:     Worried About Running Out of Food in the Last Year:     Ran Out of Food in the Last Year:    Transportation Needs:     Lack of Transportation (Medical):      Lack of Transportation (Non-Medical):    Physical Activity:     Days of Exercise per Week:     Minutes of Exercise per Session:    Stress:     Feeling of Stress :    Social Connections:     Frequency of Communication with Friends and Family:     Frequency of Social Gatherings with Friends and Family:     Attends Synagogue Services:     Active Member of Clubs or Organizations:     Attends Club or Organization Meetings:     Marital Status:    Intimate Partner Violence:  Fear of Current or Ex-Partner:     Emotionally Abused:     Physically Abused:     Sexually Abused:      Family History   Problem Relation Age of Onset    Multiple sclerosis Mother     No Known Problems Father     Breast cancer Cousin        Review of Systems   Constitutional: Negative for chills, diaphoresis, fatigue and fever  Respiratory: Negative for apnea, cough, chest tightness, shortness of breath and wheezing  Cardiovascular: Negative for chest pain, palpitations and leg swelling  Gastrointestinal: Negative for abdominal distention, abdominal pain, anal bleeding, constipation, diarrhea, nausea, rectal pain and vomiting  Genitourinary: Negative for difficulty urinating, dyspareunia, dysuria, frequency, hematuria, menstrual problem, pelvic pain, urgency, vaginal bleeding, vaginal discharge and vaginal pain  Musculoskeletal: Negative for arthralgias, back pain and myalgias  Skin: Negative for color change and rash  Neurological: Negative for dizziness, syncope, light-headedness, numbness and headaches  Hematological: Negative for adenopathy  Does not bruise/bleed easily  Psychiatric/Behavioral: Negative for dysphoric mood and sleep disturbance  The patient is not nervous/anxious  Objective   Physical Exam  OBGyn Exam     Objective      /78 (BP Location: Left arm, Patient Position: Sitting, Cuff Size: Adult)   Ht 6' 1" (1 854 m)   Wt 69 9 kg (154 lb)   LMP 08/09/2021   BMI 20 32 kg/m²     General:   alert and oriented, in no acute distress   Neck: normal to inspection and palpation   Breast: normal appearance, no masses or tenderness   Heart:    Lungs:    Abdomen: soft, non-tender, without masses or organomegaly   Vulva: normal   Vagina: Without erythema or lesions or discharge  Normal   Cervix: Without lesions or discharge or cervicitis    No Cervical motion tenderness   Uterus: normal size, anteverted, non-tender   Adnexa: no mass, fullness, tenderness   Rectum: Deferred, patient declined    Psych:  Normal mood and affect   Skin:  Without obvious lesions   Eyes: symmetric, with normal movements and reactivity   Musculoskeletal:  Normal muscle tone and movements appreciated

## 2021-08-23 NOTE — PATIENT INSTRUCTIONS
Anemia   WHAT YOU NEED TO KNOW:   What is anemia? Anemia is a low number of red blood cells or a low amount of hemoglobin in your red blood cells  Hemoglobin is a protein that helps carry oxygen throughout your body  Red blood cells use iron to create hemoglobin  Anemia may develop if your body does not have enough iron  It may also develop if your body does not make enough red blood cells or they die faster than your body can make them  What increases my risk for anemia? · Trauma or surgery that causes massive blood loss    · A gastrointestinal bleed    · A woman's monthly period    · A family history of blood disease or anemia    · Liver or kidney disease, cancer, rheumatoid arthritis, or hyperthyroidism    · Alcohol abuse    · Lack of foods that contain iron, folic acid, or vitamin B12    What are the signs and symptoms of anemia? · Chest pain or a fast heartbeat    · Lightheadedness, dizziness, or shortness of breath    · Cold or pale skin    · Tiredness, weakness, or confusion    How is anemia diagnosed? Blood tests will show if you have anemia  How is anemia treated? Treatment depends on the type of anemia you have  You may need any of the following:  · Iron or folic acid supplements  help increase your red blood cell and hemoglobin levels  · Vitamin B12 injections  may help boost your red blood cell count and decrease your symptoms  · A blood transfusion  may be needed if your body cannot replace the blood you have lost     · Surgery  may be needed to stop bleeding, or if your anemia is severe  How can I prevent anemia? Eat healthy foods rich in iron and vitamin C  Nuts, meat, dark leafy green vegetables, and beans are high in iron and protein  Vitamin C helps your body absorb iron  Foods rich in vitamin C include oranges and other citrus fruits  Ask your healthcare provider for a list of other foods that are high in iron or vitamin C  Ask if you need to be on a special diet          Call 911 or have someone call 911 for any of the following:   · You lose consciousness  · You have severe chest pain  When should I seek immediate care? · You have dark or bloody bowel movements  When should I contact my healthcare provider? · Your symptoms are worse, even after treatment  · You have questions or concerns about your condition or care  CARE AGREEMENT:   You have the right to help plan your care  Learn about your health condition and how it may be treated  Discuss treatment options with your healthcare providers to decide what care you want to receive  You always have the right to refuse treatment  The above information is an  only  It is not intended as medical advice for individual conditions or treatments  Talk to your doctor, nurse or pharmacist before following any medical regimen to see if it is safe and effective for you  © Copyright [employer] 2021 Information is for End User's use only and may not be sold, redistributed or otherwise used for commercial purposes   All illustrations and images included in CareNotes® are the copyrighted property of [employer]  or [address] [place] [address]

## 2021-08-27 LAB
C TRACH DNA SPEC QL NAA+PROBE: NEGATIVE
N GONORRHOEA DNA SPEC QL NAA+PROBE: NEGATIVE

## 2021-08-30 LAB
LAB AP GYN PRIMARY INTERPRETATION: NORMAL
Lab: NORMAL
PATH INTERP SPEC-IMP: NORMAL

## 2021-09-21 ENCOUNTER — NURSE TRIAGE (OUTPATIENT)
Dept: OTHER | Facility: OTHER | Age: 31
End: 2021-09-21

## 2021-09-21 DIAGNOSIS — B34.9 VIRAL INFECTION, UNSPECIFIED: Primary | ICD-10-CM

## 2021-09-21 PROCEDURE — U0003 INFECTIOUS AGENT DETECTION BY NUCLEIC ACID (DNA OR RNA); SEVERE ACUTE RESPIRATORY SYNDROME CORONAVIRUS 2 (SARS-COV-2) (CORONAVIRUS DISEASE [COVID-19]), AMPLIFIED PROBE TECHNIQUE, MAKING USE OF HIGH THROUGHPUT TECHNOLOGIES AS DESCRIBED BY CMS-2020-01-R: HCPCS | Performed by: FAMILY MEDICINE

## 2021-09-21 PROCEDURE — U0005 INFEC AGEN DETEC AMPLI PROBE: HCPCS | Performed by: FAMILY MEDICINE

## 2021-09-21 NOTE — TELEPHONE ENCOUNTER
Regarding: covid test--exposure--no symptoms (1 of group of 10)  ----- Message from Tori Dowling sent at 9/21/2021  4:33 PM EDT -----  " I was at a coworkers wedding and someone there tested positive and I need to get tested  No symptoms  "

## 2021-09-22 NOTE — TELEPHONE ENCOUNTER
1  Were you within 6 feet or less, for up to 15 minutes or more with a person that has a confirmed COVID-19 test? Yes exposed at a wedding     2  What was the date of your exposure? 9/18    3  Are you experiencing any symptoms attributed to the virus?  (Assess for SOB, cough, fever, difficulty breathing) Denies  4  HIGH RISK: Do you have any history heart or lung conditions, weakened immune system, diabetes, Asthma, CHF, HIV, COPD, Chemo, renal failure, sickle cell, etc? Denies  5  PREGNANCY: Are you pregnant or did you recently give birth? Denies

## 2021-09-23 ENCOUNTER — NURSE TRIAGE (OUTPATIENT)
Dept: OTHER | Facility: OTHER | Age: 31
End: 2021-09-23

## 2021-09-23 DIAGNOSIS — Z20.828 SARS-ASSOCIATED CORONAVIRUS EXPOSURE: Primary | ICD-10-CM

## 2021-09-23 PROCEDURE — U0003 INFECTIOUS AGENT DETECTION BY NUCLEIC ACID (DNA OR RNA); SEVERE ACUTE RESPIRATORY SYNDROME CORONAVIRUS 2 (SARS-COV-2) (CORONAVIRUS DISEASE [COVID-19]), AMPLIFIED PROBE TECHNIQUE, MAKING USE OF HIGH THROUGHPUT TECHNOLOGIES AS DESCRIBED BY CMS-2020-01-R: HCPCS | Performed by: FAMILY MEDICINE

## 2021-09-23 PROCEDURE — U0005 INFEC AGEN DETEC AMPLI PROBE: HCPCS | Performed by: FAMILY MEDICINE

## 2021-09-23 NOTE — TELEPHONE ENCOUNTER
Reason for Disposition   [1] CLOSE CONTACT COVID-19 EXPOSURE within last 14 days AND [2] NO symptoms    Answer Assessment - Initial Assessment Questions  1  COVID-19 CLOSE CONTACT: "Who is the person with the confirmed or suspected COVID-19 infection that you were exposed to?"      Yes   2  PLACE of CONTACT: "Where were you when you were exposed to COVID-19?" (e g , home, school, medical waiting room; which city?)      Home   3  TYPE of CONTACT: "How much contact was there?" (e g , sitting next to, live in same house, work in same office, same building)      Close Contact  4  DURATION of CONTACT: "How long were you in contact with the COVID-19 patient?" (e g , a few seconds, passed by person, a few minutes, 15 minutes or longer, live with the patient)      Several hours   5  MASK: "Were you wearing a mask?" "Was the other person wearing a mask?" Note: wearing a mask reduces the risk of an otherwise close contact  No  6  DATE of CONTACT: "When did you have contact with a COVID-19 patient?" (e g , how many days ago)      Sunday  7  COMMUNITY SPREAD: "Are there lots of cases of COVID-19 (community spread) where you live?" (See public health department website, if unsure)        Yes  8  SYMPTOMS: "Do you have any symptoms?" (e g , fever, cough, breathing difficulty, loss of taste or smell)      No  9  PREGNANCY OR POSTPARTUM: "Is there any chance you are pregnant?" "When was your last menstrual period?" "Did you deliver in the last 2 weeks?"      No  10  HIGH RISK: "Do you have any heart or lung problems?" "Do you have a weak immune system?" (e g , heart failure, COPD, asthma, HIV positive, chemotherapy, renal failure, diabetes mellitus, sickle cell anemia, obesity)        No  11  TRAVEL: "Have you traveled out of the country recently?" If Yes, ask: "When and where?" Also ask about out-of-state travel, since the [facility] has identified some high-risk cities for community spread in the [address] [facility] [address]   Note: Travel becomes less relevant if there is widespread community transmission where the patient lives          No    Protocols used: CORONAVIRUS (COVID-19) EXPOSURE-ADULT-AH

## 2021-09-23 NOTE — TELEPHONE ENCOUNTER
Regarding: Exposure to covid   ----- Message from Leticia Andrews sent at 9/23/2021  4:08 PM EDT -----  " I was expose to someone who tested positive for covid "

## 2021-09-30 ENCOUNTER — OFFICE VISIT (OUTPATIENT)
Dept: FAMILY MEDICINE CLINIC | Facility: CLINIC | Age: 31
End: 2021-09-30
Payer: COMMERCIAL

## 2021-09-30 VITALS
OXYGEN SATURATION: 97 % | DIASTOLIC BLOOD PRESSURE: 68 MMHG | RESPIRATION RATE: 18 BRPM | HEART RATE: 93 BPM | WEIGHT: 158 LBS | TEMPERATURE: 98 F | HEIGHT: 71 IN | BODY MASS INDEX: 22.12 KG/M2 | SYSTOLIC BLOOD PRESSURE: 102 MMHG

## 2021-09-30 DIAGNOSIS — K21.9 GASTROESOPHAGEAL REFLUX DISEASE WITHOUT ESOPHAGITIS: Primary | ICD-10-CM

## 2021-09-30 DIAGNOSIS — Z00.00 ANNUAL PHYSICAL EXAM: ICD-10-CM

## 2021-09-30 PROCEDURE — 99385 PREV VISIT NEW AGE 18-39: CPT | Performed by: PHYSICIAN ASSISTANT

## 2021-09-30 RX ORDER — LANOLIN ALCOHOL/MO/W.PET/CERES
325 CREAM (GRAM) TOPICAL
COMMUNITY

## 2021-09-30 NOTE — PROGRESS NOTES
[facility]    NAME: Mary Oconnell  AGE: 36 y o  SEX: female  : 1990     DATE: 2021     Assessment and Plan:     Problem List Items Addressed This Visit        Digestive    Gastroesophageal reflux disease without esophagitis - Primary     Currently daily managed with kentrell  Patient was explained about the lifestyle and dietary modifications  Advised to avoid fatty, spicy or greasy foods, chocolates, caffeine, alcohol, mint and any other triggering foods   - Discussed avoiding eating 3 hours before bed  Encouraged 2-4 week trial of H2 blockers, Pepcid for improvement of syptoms           Other Visit Diagnoses     Annual physical exam          Pt is a 36 y o  female  Vaccines: UTD  Sexual Health: gets routine cervical cancer screening at [facility]yn  Family history: Mother - well controlled MS  Reviewed FLP and A1C        Immunizations and preventive care screenings were discussed with patient today  Appropriate education was printed on patient's after visit summary  Counseling:  Alcohol/drug use: discussed moderation in alcohol intake, the recommendations for healthy alcohol use, and avoidance of illicit drug use  Dental Health: discussed importance of regular tooth brushing, flossing, and dental visits  Sexual health: discussed sexually transmitted diseases, partner selection, use of condoms, avoidance of unintended pregnancy, and contraceptive alternatives  · Exercise: the importance of regular exercise/physical activity was discussed  Recommend exercise 3-5 times per week for at least 30 minutes  Return in 1 year (on 2022)  Chief Complaint:     Chief Complaint   Patient presents with   [address] [place] [address] to establish care      History of Present Illness:     Adult Annual Physical   Patient here for a comprehensive physical exam  The patient reports no problems      Heartburn with most meals    No family history of stomach ulcer  Diet and Physical Activity  · Diet/Nutrition: well balanced diet and consuming 3-5 servings of fruits/vegetables daily  · Exercise: moderate cardiovascular exercise  Depression Screening  PHQ-9 Depression Screening    PHQ-9:   Frequency of the following problems over the past two weeks:      Little interest or pleasure in doing things: 0 - not at all  Feeling down, depressed, or hopeless: 0 - not at all  PHQ-2 Score: 0       General Health  · Sleep: sleeps poorly interrupted sleep  · Hearing: normal - bilateral   · Vision: no vision problems and most recent eye exam >1 year ago  · Dental: regular dental visits and brushes teeth twice daily  /GYN Health  · Last menstrual period: 9/13/2021  · Contraceptive method: barrier methods  · History of STDs?: yes only HPV on prior pap     Review of Systems:     Review of Systems   Constitutional: Negative for activity change, chills, fatigue, fever and unexpected weight change  Eyes: Negative for visual disturbance  Respiratory: Negative for cough, shortness of breath and wheezing  Cardiovascular: Negative for chest pain, palpitations and leg swelling  Gastrointestinal: Negative for abdominal pain, constipation, diarrhea and nausea  Heartburn with every meal, takes mylanta   Musculoskeletal: Negative for arthralgias and myalgias  Skin: Negative for pallor, rash and wound  Allergic/Immunologic: Negative for environmental allergies and food allergies  Neurological: Negative for dizziness and headaches  Psychiatric/Behavioral: Negative for behavioral problems, dysphoric mood and sleep disturbance  The patient is not nervous/anxious  Past Medical History:     Past Medical History:   Diagnosis Date    Abnormal Pap smear of cervix     HPV (human papilloma virus) infection     Osteoarthritis     Varicella       Past Surgical History:     No past surgical history on file     Social History:     Social History     Socioeconomic History    Marital status: Single     Spouse name: None    Number of children: None    Years of education: None    Highest education level: None   Occupational History    None   Tobacco Use    Smoking status: Never Smoker    Smokeless tobacco: Never Used   Vaping Use    Vaping Use: Never used   Substance and Sexual Activity    Alcohol use: Yes     Alcohol/week: 2 0 standard drinks     Types: 2 Glasses of wine per week     Comment: socially    Drug use: No    Sexual activity: Yes     Partners: Male     Birth control/protection: Condom Male     Comment: sporadic   Other Topics Concern    None   Social History Narrative    None     Social Determinants of Health     Financial Resource Strain:     Difficulty of Paying Living Expenses:    Food Insecurity:     Worried About Running Out of Food in the Last Year:     Ran Out of Food in the Last Year:    Transportation Needs:     Lack of Transportation (Medical):      Lack of Transportation (Non-Medical):    Physical Activity:     Days of Exercise per Week:     Minutes of Exercise per Session:    Stress:     Feeling of Stress :    Social Connections:     Frequency of Communication with Friends and Family:     Frequency of Social Gatherings with Friends and Family:     Attends Mandaeism Services:     Active Member of Clubs or Organizations:     Attends Club or Organization Meetings:     Marital Status:    Intimate Partner Violence:     Fear of Current or Ex-Partner:     Emotionally Abused:     Physically Abused:     Sexually Abused:       Family History:     Family History   Problem Relation Age of Onset    Multiple sclerosis Mother     Thyroid disease Mother     Hyperlipidemia Father     Breast cancer Cousin     Heart attack Maternal Grandfather     Heart attack Paternal Grandfather     Alzheimer's disease Paternal Grandfather       Current Medications:     Current Outpatient Medications   Medication Sig Dispense Refill    Ascorbic Acid (VITAMIN C PO) Take by mouth      ferrous sulfate 325 (65 FE) MG EC tablet Take 325 mg by mouth 3 (three) times a day with meals      Multiple Vitamin (MULTI-VITAMIN PO) Take by mouth (Patient not taking: Reported on 9/30/2021)       No current facility-administered medications for this visit  Allergies:     No Known Allergies   Physical Exam:     /68 (BP Location: Left arm, Patient Position: Sitting, Cuff Size: Standard)   Pulse 93   Temp 98 °F (36 7 °C)   Resp 18   Ht 5' 10 5" (1 791 m)   Wt 71 7 kg (158 lb)   LMP 09/13/2021   SpO2 97%   Breastfeeding No   BMI 22 35 kg/m²     Physical Exam  Vitals reviewed  Constitutional:       General: She is not in acute distress  Appearance: She is well-developed  She is not diaphoretic  HENT:      Head: Normocephalic  Right Ear: Tympanic membrane, ear canal and external ear normal       Left Ear: Tympanic membrane, ear canal and external ear normal       Nose: Nose normal       Mouth/Throat:      Mouth: Mucous membranes are moist    Cardiovascular:      Rate and Rhythm: Normal rate and regular rhythm  Pulses: Normal pulses  Heart sounds: No murmur heard  Pulmonary:      Effort: Pulmonary effort is normal    Abdominal:      General: Bowel sounds are normal  There is no distension  Tenderness: There is no abdominal tenderness  There is no guarding  Musculoskeletal:         General: Normal range of motion  Cervical back: Normal range of motion  Lymphadenopathy:      Cervical: No cervical adenopathy  Skin:     General: Skin is warm  Neurological:      Mental Status: She is alert  Psychiatric:         Behavior: Behavior normal          Thought Content:  Thought content normal          Judgment: Judgment normal           Urvashi Corbin PA-C   [id number]

## 2021-09-30 NOTE — PATIENT INSTRUCTIONS

## 2021-10-05 PROCEDURE — U0003 INFECTIOUS AGENT DETECTION BY NUCLEIC ACID (DNA OR RNA); SEVERE ACUTE RESPIRATORY SYNDROME CORONAVIRUS 2 (SARS-COV-2) (CORONAVIRUS DISEASE [COVID-19]), AMPLIFIED PROBE TECHNIQUE, MAKING USE OF HIGH THROUGHPUT TECHNOLOGIES AS DESCRIBED BY CMS-2020-01-R: HCPCS | Performed by: PHYSICIAN ASSISTANT

## 2021-10-05 PROCEDURE — U0005 INFEC AGEN DETEC AMPLI PROBE: HCPCS | Performed by: PHYSICIAN ASSISTANT

## 2021-10-06 ENCOUNTER — IMMUNIZATIONS (OUTPATIENT)
Dept: FAMILY MEDICINE CLINIC | Facility: MEDICAL CENTER | Age: 31
End: 2021-10-06

## 2021-10-06 DIAGNOSIS — Z23 ENCOUNTER FOR IMMUNIZATION: Primary | ICD-10-CM

## 2021-10-06 PROCEDURE — 91300 SARSCOV2 VAC 30MCG/0.3ML IM: CPT

## 2021-12-02 ENCOUNTER — TELEPHONE (OUTPATIENT)
Dept: FAMILY MEDICINE CLINIC | Facility: CLINIC | Age: 31
End: 2021-12-02

## 2021-12-02 DIAGNOSIS — R05.9 COUGH: Primary | ICD-10-CM

## 2021-12-02 DIAGNOSIS — R43.0 NO SENSE OF SMELL: ICD-10-CM

## 2021-12-02 PROCEDURE — U0003 INFECTIOUS AGENT DETECTION BY NUCLEIC ACID (DNA OR RNA); SEVERE ACUTE RESPIRATORY SYNDROME CORONAVIRUS 2 (SARS-COV-2) (CORONAVIRUS DISEASE [COVID-19]), AMPLIFIED PROBE TECHNIQUE, MAKING USE OF HIGH THROUGHPUT TECHNOLOGIES AS DESCRIBED BY CMS-2020-01-R: HCPCS | Performed by: PHYSICIAN ASSISTANT

## 2021-12-02 PROCEDURE — U0005 INFEC AGEN DETEC AMPLI PROBE: HCPCS | Performed by: PHYSICIAN ASSISTANT

## 2021-12-08 ENCOUNTER — TELEPHONE (OUTPATIENT)
Dept: FAMILY MEDICINE CLINIC | Facility: CLINIC | Age: 31
End: 2021-12-08

## 2022-01-21 ENCOUNTER — NURSE TRIAGE (OUTPATIENT)
Dept: OTHER | Facility: OTHER | Age: 32
End: 2022-01-21

## 2022-01-21 NOTE — TELEPHONE ENCOUNTER
Tested positive for covid 12/1  Started with sore throat and cough 1/19  Tested today with a rapid home test and was positive  Care advice given- it was explained that patients can continue to show positive for covid up to 90 after initial symptoms  Patient verbalized understanding   Will monitor symptoms and call back should they worsen or persist

## 2022-01-21 NOTE — TELEPHONE ENCOUNTER
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 What should I do next?  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

## 2022-01-21 NOTE — TELEPHONE ENCOUNTER
Reason for Disposition   COVID-19 Testing, questions about    Answer Assessment - Initial Assessment Questions  1  COVID-19 DIAGNOSIS: "Who made your COVID-19 diagnosis?" "Was it confirmed by a positive lab test?" If not diagnosed by a HCP, ask "Are there lots of cases (community spread) where you live?" Note: See public health department website, if unsure  Confirmed positive 12/1  2  COVID-19 EXPOSURE: "Was there any known exposure to COVID before the symptoms began?" CDC Definition of close contact: within 6 feet (2 meters) for a total of 15 minutes or more over a 24-hour period  unsure  3  ONSET: "When did the COVID-19 symptoms start?"       New onset of sore throat and cough started 1/19  4  WORST SYMPTOM: "What is your worst symptom?" (e g , cough, fever, shortness of breath, muscle aches)      Sore throat  5  COUGH: "Do you have a cough?" If Yes, ask: "How bad is the cough?"        Productive cough  6  FEVER: "Do you have a fever?" If Yes, ask: "What is your temperature, how was it measured, and when did it start?"      no  7  RESPIRATORY STATUS: "Describe your breathing?" (e g , shortness of breath, wheezing, unable to speak)       denies  8  BETTER-SAME-WORSE: "Are you getting better, staying the same or getting worse compared to yesterday?"  If getting worse, ask, "In what way?"      same  9  HIGH RISK DISEASE: "Do you have any chronic medical problems?" (e g , asthma, heart or lung disease, weak immune system, obesity, etc )      denies  10  VACCINE: "Have you gotten the COVID-19 vaccine?" If Yes ask: "Which one, how many shots, when did you get it?"        yes  11  PREGNANCY: "Is there any chance you are pregnant?" "When was your last menstrual period?"        no  12   OTHER SYMPTOMS: "Do you have any other symptoms?"  (e g , chills, fatigue, headache, loss of smell or taste, muscle pain, sore throat; new loss of smell or taste especially support the diagnosis of COVID-19)        Sore throat, cough    Protocols used: CORONAVIRUS (COVID-19) DIAGNOSED OR SUSPECTED-ADULT-

## 2022-04-05 ENCOUNTER — APPOINTMENT (EMERGENCY)
Dept: ULTRASOUND IMAGING | Facility: HOSPITAL | Age: 32
End: 2022-04-05
Payer: COMMERCIAL

## 2022-04-05 ENCOUNTER — HOSPITAL ENCOUNTER (EMERGENCY)
Facility: HOSPITAL | Age: 32
Discharge: HOME | End: 2022-04-05
Attending: EMERGENCY MEDICINE | Admitting: EMERGENCY MEDICINE
Payer: COMMERCIAL

## 2022-04-05 VITALS
OXYGEN SATURATION: 97 % | TEMPERATURE: 98.3 F | RESPIRATION RATE: 18 BRPM | SYSTOLIC BLOOD PRESSURE: 115 MMHG | DIASTOLIC BLOOD PRESSURE: 77 MMHG | HEART RATE: 84 BPM

## 2022-04-05 DIAGNOSIS — N39.0 UTI (URINARY TRACT INFECTION): ICD-10-CM

## 2022-04-05 DIAGNOSIS — O20.0 THREATENED MISCARRIAGE IN EARLY PREGNANCY: Primary | ICD-10-CM

## 2022-04-05 LAB
B-HCG SERPL-ACNC: 133 MIU/ML (ref 0–11.6)
BACTERIA UR QL AUTO: ABNORMAL /HPF
BASOPHILS # BLD AUTO: 0.04 THOUSANDS/ΜL (ref 0–0.1)
BASOPHILS NFR BLD AUTO: 1 % (ref 0–1)
BILIRUB UR QL STRIP: NEGATIVE
CLARITY UR: ABNORMAL
COLOR UR: ABNORMAL
EOSINOPHIL # BLD AUTO: 0.21 THOUSAND/ΜL (ref 0–0.61)
EOSINOPHIL NFR BLD AUTO: 3 % (ref 0–6)
ERYTHROCYTE [DISTWIDTH] IN BLOOD BY AUTOMATED COUNT: 12.4 % (ref 11.6–15.1)
GLUCOSE UR STRIP-MCNC: NEGATIVE MG/DL
HCT VFR BLD AUTO: 41 % (ref 34.8–46.1)
HGB BLD-MCNC: 13.6 G/DL (ref 11.5–15.4)
HGB UR QL STRIP.AUTO: ABNORMAL
IMM GRANULOCYTES # BLD AUTO: 0.01 THOUSAND/UL (ref 0–0.2)
IMM GRANULOCYTES NFR BLD AUTO: 0 % (ref 0–2)
KETONES UR STRIP-MCNC: NEGATIVE MG/DL
LEUKOCYTE ESTERASE UR QL STRIP: ABNORMAL
LYMPHOCYTES # BLD AUTO: 2.14 THOUSANDS/ΜL (ref 0.6–4.47)
LYMPHOCYTES NFR BLD AUTO: 29 % (ref 14–44)
MCH RBC QN AUTO: 30 PG (ref 26.8–34.3)
MCHC RBC AUTO-ENTMCNC: 33.2 G/DL (ref 31.4–37.4)
MCV RBC AUTO: 91 FL (ref 82–98)
MONOCYTES # BLD AUTO: 0.57 THOUSAND/ΜL (ref 0.17–1.22)
MONOCYTES NFR BLD AUTO: 8 % (ref 4–12)
NEUTROPHILS # BLD AUTO: 4.52 THOUSANDS/ΜL (ref 1.85–7.62)
NEUTS SEG NFR BLD AUTO: 59 % (ref 43–75)
NITRITE UR QL STRIP: NEGATIVE
NON-SQ EPI CELLS URNS QL MICRO: ABNORMAL /HPF
NRBC BLD AUTO-RTO: 0 /100 WBCS
PH UR STRIP.AUTO: 6 [PH]
PLATELET # BLD AUTO: 291 THOUSANDS/UL (ref 149–390)
PMV BLD AUTO: 8.9 FL (ref 8.9–12.7)
PROT UR STRIP-MCNC: ABNORMAL MG/DL
RBC # BLD AUTO: 4.53 MILLION/UL (ref 3.81–5.12)
RBC #/AREA URNS AUTO: ABNORMAL /HPF
SP GR UR STRIP.AUTO: <=1.005 (ref 1–1.03)
UROBILINOGEN UR QL STRIP.AUTO: 0.2 E.U./DL
WBC # BLD AUTO: 7.49 THOUSAND/UL (ref 4.31–10.16)
WBC #/AREA URNS AUTO: ABNORMAL /HPF

## 2022-04-05 PROCEDURE — 81001 URINALYSIS AUTO W/SCOPE: CPT | Performed by: PHYSICIAN ASSISTANT

## 2022-04-05 PROCEDURE — 81003 URINALYSIS AUTO W/O SCOPE: CPT | Performed by: PHYSICIAN ASSISTANT

## 2022-04-05 PROCEDURE — 99284 EMERGENCY DEPT VISIT MOD MDM: CPT | Performed by: PHYSICIAN ASSISTANT

## 2022-04-05 PROCEDURE — 84702 CHORIONIC GONADOTROPIN TEST: CPT | Performed by: PHYSICIAN ASSISTANT

## 2022-04-05 PROCEDURE — 85025 COMPLETE CBC W/AUTO DIFF WBC: CPT | Performed by: PHYSICIAN ASSISTANT

## 2022-04-05 PROCEDURE — 76815 OB US LIMITED FETUS(S): CPT

## 2022-04-05 PROCEDURE — 36415 COLL VENOUS BLD VENIPUNCTURE: CPT | Performed by: PHYSICIAN ASSISTANT

## 2022-04-05 PROCEDURE — 99284 EMERGENCY DEPT VISIT MOD MDM: CPT

## 2022-04-05 RX ORDER — CEPHALEXIN 500 MG/1
500 CAPSULE ORAL EVERY 12 HOURS SCHEDULED
Qty: 14 CAPSULE | Refills: 0 | Status: SHIPPED | OUTPATIENT
Start: 2022-04-05 | End: 2022-04-12

## 2022-04-05 NOTE — DISCHARGE INSTRUCTIONS
Pelvic rest   Use Tylenol every 4 hours for pain  Take all oral antibiotics until done  Follow-up with your doctor in next 2 days

## 2022-04-05 NOTE — ED NOTES
D/c and follow up reviewed with pt  Pt verbalized understanding and has no further questions at this time       [id number] Caden Deshpande RN  04/05/22 3657

## 2022-04-05 NOTE — ED PROVIDER NOTES
History  Chief Complaint   Patient presents with    Vaginal Bleeding     pt reports + preg test at home, LMP 2022; reports bleeding with clots started at 12 today     Pt with PMH: osteoarthritis, GERD, no pertinent PSH, COVID + 2022  LMP 2022, , + home preg tests  Presents to ED c/o over 12 hr h/o intermittent, but worsening, unsolicitated vaginal bleeding, now with some clots, lower abd pain, slight cramping, no fever, no urinary complaints, no back pain, no bowel changes, no other complaints          Prior to Admission Medications   Prescriptions Last Dose Informant Patient Reported? Taking? Ascorbic Acid (VITAMIN C PO)   Yes No   Sig: Take by mouth   Multiple Vitamin (MULTI-VITAMIN PO)   Yes No   Sig: Take by mouth   Patient not taking: Reported on 2021   ferrous sulfate 325 (65 FE) MG EC tablet   Yes No   Sig: Take 325 mg by mouth 3 (three) times a day with meals      Facility-Administered Medications: None       Past Medical History:   Diagnosis Date    Abnormal Pap smear of cervix     HPV (human papilloma virus) infection     Osteoarthritis     Varicella        History reviewed  No pertinent surgical history  Family History   Problem Relation Age of Onset    Multiple sclerosis Mother     Thyroid disease Mother     Hyperlipidemia Father     Breast cancer Cousin     Heart attack Maternal Grandfather     Heart attack Paternal Grandfather     Alzheimer's disease Paternal Grandfather      I have reviewed and agree with the history as documented  E-Cigarette/Vaping    E-Cigarette Use Never User      E-Cigarette/Vaping Substances    Nicotine No     THC No     CBD No     Flavoring No     Other No     Unknown No      Social History     Tobacco Use    Smoking status: Never Smoker    Smokeless tobacco: Never Used   Vaping Use    Vaping Use: Never used   Substance Use Topics    Alcohol use:  Yes     Alcohol/week: 2 0 standard drinks     Types: 2 Glasses of wine per week     Comment: socially    Drug use: No       Review of Systems   Constitutional: Negative for fever  Eyes: Negative for visual disturbance  Respiratory: Negative for shortness of breath  Cardiovascular: Negative for chest pain  Gastrointestinal: Positive for abdominal pain  Negative for vomiting  Genitourinary: Positive for vaginal bleeding  Negative for dysuria, frequency and hematuria  Musculoskeletal: Negative for myalgias  Neurological: Negative for dizziness and headaches  All other systems reviewed and are negative  Physical Exam  Physical Exam  Vitals and nursing note reviewed  Constitutional:       General: She is not in acute distress  Appearance: Normal appearance  She is well-developed  HENT:      Head: Normocephalic and atraumatic  Nose: Nose normal       Mouth/Throat:      Mouth: Mucous membranes are moist       Pharynx: Oropharynx is clear  Eyes:      Conjunctiva/sclera: Conjunctivae normal    Cardiovascular:      Rate and Rhythm: Normal rate  Pulmonary:      Effort: Pulmonary effort is normal    Abdominal:      General: Abdomen is flat  Bowel sounds are normal       Palpations: Abdomen is soft  Tenderness: There is no abdominal tenderness  Genitourinary:     Comments: deferred  Musculoskeletal:         General: Normal range of motion  Cervical back: Normal range of motion  Skin:     General: Skin is warm and dry  Neurological:      Mental Status: She is alert     Psychiatric:         Behavior: Behavior normal          Vital Signs  ED Triage Vitals [04/05/22 1620]   Temperature Pulse Respirations Blood Pressure SpO2   98 3 °F (36 8 °C) 84 18 115/77 97 %      Temp Source Heart Rate Source Patient Position - Orthostatic VS BP Location FiO2 (%)   Oral Monitor Lying Right arm --      Pain Score       --           Vitals:    04/05/22 1620   BP: 115/77   Pulse: 84   Patient Position - Orthostatic VS: Lying         Visual Acuity      ED Medications  Medications - No data to display    Diagnostic Studies  Results Reviewed     Procedure Component Value Units Date/Time    Quantitative hCG [[id number]]  (Abnormal) Collected: 04/05/22 1638    Lab Status: Final result Specimen: Blood from Arm, Left Updated: 04/05/22 1705     HCG, Quant 133 mIU/mL     Narrative:       Expected Ranges:     Approximate               Approximate HCG  Gestation age          Concentration ( mIU/mL)  _____________          ______________________   [place] Miguel Angel                      HCG values  0 2-1                       5-50  1-2                           2-3                         100-5000  3-4                         500-67199  4-5                         1000-03733  5-6                         [id number]  6-8                         [id number]  8-12                        [id number]      Urine Microscopic [[id number]]  (Abnormal) Collected: 04/05/22 1641    Lab Status: Final result Specimen: Urine, Clean Catch Updated: 04/05/22 1653     RBC, UA 4-10 /hpf      WBC, UA 4-10 /hpf      Epithelial Cells None Seen /hpf      Bacteria, UA Occasional /hpf     UA w Reflex to Microscopic w Reflex to Culture [[id number]]  (Abnormal) Collected: 04/05/22 1641    Lab Status: Final result Specimen: Urine, Clean Catch Updated: 04/05/22 1647     Color, UA Orange     Clarity, UA Slightly Cloudy     Specific Gravity, UA <=1 005     pH, UA 6 0     Leukocytes, UA 1+     Nitrite, UA Negative     Protein, UA 1+ mg/dl      Glucose, UA Negative mg/dl      Ketones, UA Negative mg/dl      Urobilinogen, UA 0 2 E U /dl      Bilirubin, UA Negative     Blood, UA 3+    CBC and differential [[id number]] Collected: 04/05/22 1638    Lab Status: Final result Specimen: Blood from Arm, Left Updated: 04/05/22 1642     WBC 7 49 Thousand/uL      RBC 4 53 Million/uL      Hemoglobin 13 6 g/dL      Hematocrit 41 0 %      MCV 91 fL      MCH 30 0 pg      MCHC 33 2 g/dL      RDW 12 4 %      MPV 8 9 fL      Platelets 312 Thousands/uL      nRBC 0 /100 WBCs      Neutrophils Relative 59 %      Immat GRANS % 0 %      Lymphocytes Relative 29 %      Monocytes Relative 8 %      Eosinophils Relative 3 %      Basophils Relative 1 %      Neutrophils Absolute 4 52 Thousands/µL      Immature Grans Absolute 0 01 Thousand/uL      Lymphocytes Absolute 2 14 Thousands/µL      Monocytes Absolute 0 57 Thousand/µL      Eosinophils Absolute 0 21 Thousand/µL      Basophils Absolute 0 04 Thousands/µL                  US OB pregnancy limited with transvaginal   Final Result by Jason Murillo MD ([id number])      No intrauterine gestation or adnexal mass identified  Differential remains early IUP, spontaneous  and ectopic pregnancy  Correlate with serial quantitative beta-hCG  Workstation performed: [id number]                    Procedures  Procedures         ED Course  ED Course as of 22 1821   Tue 2022   1710 Pt states O+ blood type   1710 HCG QUANTITATIVE(!): 133   1710 Leukocytes, UA(!): 1+   1711 Blood, UA(!): 3+   1711 WBC, UA(!): 4-10   1711 Bacteria, UA: Occasional                                             MDM  Number of Diagnoses or Management Options  Diagnosis management comments: Discussed results with pt, she had FU with OB/GYN in 2 days    Pelvic rest, will tx uti, FU        Amount and/or Complexity of Data Reviewed  Clinical lab tests: ordered and reviewed  Tests in the radiology section of CPT®: ordered and reviewed  Review and summarize past medical records: yes        Disposition  Final diagnoses:   Threatened miscarriage in early pregnancy   UTI (urinary tract infection)     Time reflects when diagnosis was documented in both MDM as applicable and the Disposition within this note     Time User Action Codes Description Comment    2022  6:10 PM [address]  [O20 0] Threatened miscarriage in early pregnancy     2022  6:11 PM Norma [address]  [N39 0] UTI (urinary tract infection) ED Disposition     ED Disposition Condition Date/Time Comment    Discharge Stable Tue Apr 5, 2022  6:10 PM Karlo Sosa discharge to home/self care  Follow-up Information     Follow up With Specialties Details Why Contact Info    Your OB/GYN              Patient's Medications   Discharge Prescriptions    CEPHALEXIN (KEFLEX) 500 MG CAPSULE    Take 1 capsule (500 mg total) by mouth every 12 (twelve) hours for 7 days       Start Date: 4/5/2022  End Date: 4/12/2022       Order Dose: 500 mg       Quantity: 14 capsule    Refills: 0       No discharge procedures on file      PDMP Review     None          ED Provider  Electronically Signed by           Adrian Mackenzie PA-C  04/05/22 14 [facility] Ashley Robins PA-C  04/05/22 1889

## 2022-04-06 ENCOUNTER — TELEPHONE (OUTPATIENT)
Dept: OBGYN CLINIC | Facility: CLINIC | Age: 32
End: 2022-04-06

## 2022-04-06 DIAGNOSIS — O20.0 THREATENED MISCARRIAGE: Primary | ICD-10-CM

## 2022-04-06 DIAGNOSIS — O20.0 THREATENED ABORTION: Primary | ICD-10-CM

## 2022-04-06 NOTE — TELEPHONE ENCOUNTER
Spoke to patient will get repeat tomorrow and f/u Friday with dr Theresa Albrecht if appropriate  Discussed different scenarios with patient  And when to call with concerns

## 2022-04-06 NOTE — TELEPHONE ENCOUNTER
----- Message from Sury Trotter sent at 4/5/2022 10:37 AM EDT -----  Regarding: FW: Vaginal bleeding/+ pregnancy test     ----- Message -----  From: Eyal Adames  Sent: 4/5/2022   6:01 AM EDT  To: [facility]  Subject: Vaginal bleeding/+ pregnancy test              Hi team,  Please follow-up with the patient see how she is doing  Early pregnancy with bleeding  Had laboratory testing yesterday with  with normal hemoglobin  Should try to obtain type and Rh to see if she needs program if Rh negative  If she is still having symptoms, could repeat HCG tomorrow  She has appointment with Dr Mari Lucas on 4/8/22  Thanks, Vy Waldron MD    Good morning, Dr Crenshaw Geno  I had a couple positive pregnancy tests last week (took a few to be sure it was not a false positive, and all 6 were positive)  I have an appointment Friday with Dr Mari Lucas (was just the earliest they could get me in)  This morning at 0300 I had some spotting, but now I have bright red vaginal bleeding with small clots  A little intermittent cramping, but nothing I cant handle  I guess Im worried about a miscarriage, but also dont want to run to the ER if its something thatll just pass  What would you recommend?      Thank you,    Eyal Adames

## 2022-04-07 ENCOUNTER — APPOINTMENT (OUTPATIENT)
Dept: LAB | Facility: HOSPITAL | Age: 32
End: 2022-04-07
Payer: COMMERCIAL

## 2022-04-07 DIAGNOSIS — O20.0 THREATENED MISCARRIAGE: ICD-10-CM

## 2022-04-07 DIAGNOSIS — O20.0 THREATENED ABORTION: ICD-10-CM

## 2022-04-07 LAB
ABO GROUP BLD: NORMAL
B-HCG SERPL-ACNC: 130 MIU/ML (ref 0–11.6)
BLD GP AB SCN SERPL QL: NEGATIVE
RH BLD: POSITIVE
SPECIMEN EXPIRATION DATE: NORMAL

## 2022-04-07 PROCEDURE — 86850 RBC ANTIBODY SCREEN: CPT

## 2022-04-07 PROCEDURE — 86900 BLOOD TYPING SEROLOGIC ABO: CPT

## 2022-04-07 PROCEDURE — 36415 COLL VENOUS BLD VENIPUNCTURE: CPT

## 2022-04-07 PROCEDURE — 86901 BLOOD TYPING SEROLOGIC RH(D): CPT

## 2022-04-07 PROCEDURE — 84702 CHORIONIC GONADOTROPIN TEST: CPT

## 2022-04-08 ENCOUNTER — OFFICE VISIT (OUTPATIENT)
Dept: OBGYN CLINIC | Facility: CLINIC | Age: 32
End: 2022-04-08
Payer: COMMERCIAL

## 2022-04-08 VITALS
WEIGHT: 169.2 LBS | DIASTOLIC BLOOD PRESSURE: 62 MMHG | SYSTOLIC BLOOD PRESSURE: 100 MMHG | HEIGHT: 70 IN | BODY MASS INDEX: 24.22 KG/M2

## 2022-04-08 DIAGNOSIS — R87.610 ASCUS WITH POSITIVE HIGH RISK HPV CERVICAL: ICD-10-CM

## 2022-04-08 DIAGNOSIS — R87.810 ASCUS WITH POSITIVE HIGH RISK HPV CERVICAL: ICD-10-CM

## 2022-04-08 DIAGNOSIS — O03.4 INCOMPLETE SPONTANEOUS ABORTION: Primary | ICD-10-CM

## 2022-04-08 PROCEDURE — 99213 OFFICE O/P EST LOW 20 MIN: CPT | Performed by: OBSTETRICS & GYNECOLOGY

## 2022-04-08 NOTE — PROGRESS NOTES
Assessment/Plan:    Diagnoses and all orders for this visit:    Incomplete spontaneous   -     hCG, quantitative; Future  -     Progesterone; Future  -     TSH, 3rd generation with Free T4 reflex; Future    ASCUS with positive high risk HPV cervical        Subjective: here for follow-up     Patient ID: Efra Lora is a 36 y o  female  HPI    61-year-old female  1 para 0 0 1 0 presents today with heavy vaginal bleeding  She did have a positive pregnancy test earlier this week  Date of HCG initially 133 follow-up was 130  Patient started bleeding more heavily last evening with some crampy discomfort  This was not a planned pregnancy however it was accepted and now disappointing affect  She did have a transvaginal ultrasound done 3 days ago which revealed normal size uterus, slightly thickened endometrial stripe measuring 11 mm  Both ovaries were normal size and dimension no ovarian cyst   Findings consistent with spontaneous   No signs of ectopic pregnancy noted  Recommend repeat quantitative HCG and progesterone on Monday  Did discuss with patient she should take measures to avoid pregnancy for least 3 normal cycles moving forward  Okay to use tampons if desired, may use Motrin or Aleve for crampy discomfort  She will call if bleeding  Or pelvic pain gets excessively worse  Otherwise follow-up here in 6 weeks p r n       Review of Systems   Respiratory: Negative  Cardiovascular: Negative  Gastrointestinal: Negative  Genitourinary: Negative  Neurological: Negative  Psychiatric/Behavioral: Negative  All other systems reviewed and are negative  Objective: no acute distress  /62   Ht 5' 10" (1 778 m)   Wt 76 7 kg (169 lb 3 2 oz)   LMP 2022 (Exact Date)   BMI 24 28 kg/m²      Physical Exam  Vitals reviewed  Exam conducted with a chaperone present  Constitutional:       Appearance: Normal appearance  She is normal weight     HENT:      Head: Normocephalic  Eyes:      Extraocular Movements: Extraocular movements intact  Conjunctiva/sclera: Conjunctivae normal       Pupils: Pupils are equal, round, and reactive to light  Pulmonary:      Effort: Pulmonary effort is normal    Abdominal:      General: Abdomen is flat  Palpations: Abdomen is soft  Genitourinary:     General: Normal vulva  Comments: Normal external genitalia blood soaked perineum  Normal size uterus  Normal cervix  No CMT  No pelvic masses    Heavy menstrual type flow with clots  Musculoskeletal:         General: Normal range of motion  Cervical back: Normal range of motion and neck supple  Skin:     General: Skin is warm and dry  Neurological:      Mental Status: She is alert and oriented to person, place, and time  Psychiatric:         Mood and Affect: Mood normal          Behavior: Behavior normal          Thought Content: Thought content normal          Judgment: Judgment normal         Please note    In addition to the time spent discussing the findings and results of today's visit and exam, I spent approximately 20 minutes of face-to-face time with the patient, greater than 50% of which was spent in counseling and coordination of care for this patient

## 2022-04-11 ENCOUNTER — APPOINTMENT (OUTPATIENT)
Dept: LAB | Facility: HOSPITAL | Age: 32
End: 2022-04-11
Payer: COMMERCIAL

## 2022-04-11 ENCOUNTER — APPOINTMENT (OUTPATIENT)
Dept: LAB | Facility: HOSPITAL | Age: 32
End: 2022-04-11

## 2022-04-11 DIAGNOSIS — O03.4 INCOMPLETE SPONTANEOUS ABORTION: ICD-10-CM

## 2022-04-11 DIAGNOSIS — Z00.8 HEALTH EXAMINATION IN POPULATION SURVEY: ICD-10-CM

## 2022-04-11 LAB
B-HCG SERPL-ACNC: 61 MIU/ML (ref 0–11.6)
CHOLEST SERPL-MCNC: 199 MG/DL
EST. AVERAGE GLUCOSE BLD GHB EST-MCNC: 103 MG/DL
HBA1C MFR BLD: 5.2 %
HDLC SERPL-MCNC: 68 MG/DL
LDLC SERPL CALC-MCNC: 112 MG/DL (ref 0–100)
NONHDLC SERPL-MCNC: 131 MG/DL
PROGEST SERPL-MCNC: 1.6 NG/ML
TRIGL SERPL-MCNC: 93 MG/DL
TSH SERPL DL<=0.05 MIU/L-ACNC: 3.31 UIU/ML (ref 0.45–4.5)

## 2022-04-11 PROCEDURE — 84702 CHORIONIC GONADOTROPIN TEST: CPT

## 2022-04-11 PROCEDURE — 84144 ASSAY OF PROGESTERONE: CPT

## 2022-04-11 PROCEDURE — 83036 HEMOGLOBIN GLYCOSYLATED A1C: CPT

## 2022-04-11 PROCEDURE — 80061 LIPID PANEL: CPT

## 2022-04-11 PROCEDURE — 36415 COLL VENOUS BLD VENIPUNCTURE: CPT

## 2022-04-11 PROCEDURE — 84443 ASSAY THYROID STIM HORMONE: CPT

## 2022-04-19 ENCOUNTER — APPOINTMENT (OUTPATIENT)
Dept: LAB | Facility: HOSPITAL | Age: 32
End: 2022-04-19
Payer: COMMERCIAL

## 2022-04-19 DIAGNOSIS — O03.4 INCOMPLETE SPONTANEOUS ABORTION: ICD-10-CM

## 2022-04-19 LAB — B-HCG SERPL-ACNC: 50 MIU/ML (ref 0–11.6)

## 2022-04-19 PROCEDURE — 36415 COLL VENOUS BLD VENIPUNCTURE: CPT

## 2022-04-19 PROCEDURE — 84702 CHORIONIC GONADOTROPIN TEST: CPT

## 2022-04-21 NOTE — ASSESSMENT & PLAN NOTE
Currently daily managed with kentrell  Patient was explained about the lifestyle and dietary modifications    Advised to avoid fatty, spicy or greasy foods, chocolates, caffeine, alcohol, mint and any other triggering foods   - Discussed avoiding eating 3 hours before bed  Encouraged 2-4 week trial of H2 blockers, Pepcid for improvement of syptoms I am faxing the referral to [facility] [place]. They will contact him to make appt.

## 2022-04-26 ENCOUNTER — APPOINTMENT (OUTPATIENT)
Dept: LAB | Facility: HOSPITAL | Age: 32
End: 2022-04-26
Payer: COMMERCIAL

## 2022-04-26 DIAGNOSIS — Z20.2 POSSIBLE EXPOSURE TO STD: ICD-10-CM

## 2022-04-26 PROCEDURE — 87389 HIV-1 AG W/HIV-1&-2 AB AG IA: CPT

## 2022-04-26 PROCEDURE — 36415 COLL VENOUS BLD VENIPUNCTURE: CPT

## 2022-04-26 PROCEDURE — 86592 SYPHILIS TEST NON-TREP QUAL: CPT

## 2022-04-26 PROCEDURE — 80074 ACUTE HEPATITIS PANEL: CPT

## 2022-04-27 ENCOUNTER — HOSPITAL ENCOUNTER (OUTPATIENT)
Dept: ULTRASOUND IMAGING | Facility: HOSPITAL | Age: 32
Discharge: HOME | End: 2022-04-27
Attending: OBSTETRICS & GYNECOLOGY
Payer: COMMERCIAL

## 2022-04-27 DIAGNOSIS — O03.4 INCOMPLETE SPONTANEOUS ABORTION: ICD-10-CM

## 2022-04-27 LAB
HIV 1+2 AB+HIV1 P24 AG SERPL QL IA: NORMAL
RPR SER QL: NORMAL

## 2022-04-27 PROCEDURE — 76815 OB US LIMITED FETUS(S): CPT

## 2022-04-28 LAB
HAV IGM SER QL: NORMAL
HBV CORE IGM SER QL: NORMAL
HBV SURFACE AG SER QL: NORMAL
HCV AB SER QL: NORMAL

## 2022-05-02 ENCOUNTER — APPOINTMENT (OUTPATIENT)
Dept: LAB | Facility: HOSPITAL | Age: 32
End: 2022-05-02
Payer: COMMERCIAL

## 2022-05-02 ENCOUNTER — OFFICE VISIT (OUTPATIENT)
Dept: OBGYN CLINIC | Facility: CLINIC | Age: 32
End: 2022-05-02
Payer: COMMERCIAL

## 2022-05-02 VITALS
SYSTOLIC BLOOD PRESSURE: 108 MMHG | DIASTOLIC BLOOD PRESSURE: 76 MMHG | WEIGHT: 169 LBS | BODY MASS INDEX: 24.2 KG/M2 | HEIGHT: 70 IN

## 2022-05-02 DIAGNOSIS — N83.201 RIGHT OVARIAN CYST: Primary | ICD-10-CM

## 2022-05-02 DIAGNOSIS — O03.4 INCOMPLETE SPONTANEOUS ABORTION: ICD-10-CM

## 2022-05-02 PROCEDURE — 99213 OFFICE O/P EST LOW 20 MIN: CPT | Performed by: OBSTETRICS & GYNECOLOGY

## 2022-05-02 NOTE — PROGRESS NOTES
Assessment/Plan:    Diagnoses and all orders for this visit:    Right ovarian cyst  -     Comprehensive metabolic panel; Future  -     CBC; Future  -     US pelvis complete non OB; Future  -     hCG, quantitative; Future    Incomplete spontaneous         Subjective: here for follow-up     Patient ID: Judy Anaya is a 36 y o  female  HPI    72-year-old female  1 para 0 010 here today for follow-up regarding incomplete spontaneous   Initially seen early April for positive pregnancy test with heavy vaginal bleeding  Quantitative HCG has been trending downward initially was 133, follow-up 130,61,50, and 61 on   Patient did have some mild epigastric discomfort, has not had any vaginal bleeding for the past several weeks  We did do a pelvic ultrasound last week which revealed normal size uterus thickened endometrium about 10 mm previously was 12 mm on prior ultrasound  There was no masslike lesion within the cervix myometrium or endometrium  Right ovary measured 4 7 x 3 9 x 3 3 cm associated with 1 complex right ovarian hemorrhagic cyst measuring 3 2 x 3 6 x 2 9 cm  The left ovary is normal in size measuring 2 3 x 2 7 x 2 3 cm  There is no adnexal mass identified  Patient is not having any pelvic pain at this time nor she have any vaginal bleeding  No intrauterine gestational sac no definite findings of products of conception within the endometrium, developing 3 6 cm right ovarian hemorrhagic cyst identified  General concern is for possible ectopic pregnancy however not having any pain or vaginal bleeding  Patient prefers to wait and observe for now  Will repeat another quant next week will get a follow-up ultrasound next week  Patient warned low threshold if pain suddenly developed that this could be ectopic pregnancy  We also me will treat this with methotrexate if this does not seem to resolve    Meantime will order CMP CBC follow-up here in 1 week and p r n  all questions answered    Review of Systems  unchanged     Objective: no acute distress  /76   Ht 5' 10" (1 778 m)   Wt 76 7 kg (169 lb)   LMP 02/19/2022   BMI 24 25 kg/m²      Physical Exam  Vitals reviewed  Constitutional:       Appearance: Normal appearance  She is normal weight  Eyes:      Extraocular Movements: Extraocular movements intact  Pupils: Pupils are equal, round, and reactive to light  Pulmonary:      Effort: Pulmonary effort is normal    Genitourinary:     Comments:  Pelvic exam did  Musculoskeletal:         General: Normal range of motion  Skin:     General: Skin is warm  Neurological:      Mental Status: She is alert and oriented to person, place, and time  Psychiatric:         Mood and Affect: Mood normal          Behavior: Behavior normal          Thought Content: Thought content normal          Judgment: Judgment normal         Please note    In addition to the time spent discussing the findings and results of today's visit and exam, I spent approximately 20 minutes of face-to-face time with the patient, greater than 50% of which was spent in counseling and coordination of care for this patient

## 2022-05-09 ENCOUNTER — APPOINTMENT (OUTPATIENT)
Dept: LAB | Facility: HOSPITAL | Age: 32
End: 2022-05-09
Payer: COMMERCIAL

## 2022-05-09 ENCOUNTER — HOSPITAL ENCOUNTER (OUTPATIENT)
Dept: ULTRASOUND IMAGING | Facility: HOSPITAL | Age: 32
Discharge: HOME | End: 2022-05-09
Attending: OBSTETRICS & GYNECOLOGY
Payer: COMMERCIAL

## 2022-05-09 DIAGNOSIS — N83.201 RIGHT OVARIAN CYST: ICD-10-CM

## 2022-05-09 LAB
ALBUMIN SERPL BCP-MCNC: 4.1 G/DL (ref 3.5–5)
ALP SERPL-CCNC: 39 U/L (ref 34–104)
ALT SERPL W P-5'-P-CCNC: 14 U/L (ref 7–52)
ANION GAP SERPL CALCULATED.3IONS-SCNC: 6 MMOL/L (ref 4–13)
AST SERPL W P-5'-P-CCNC: 15 U/L (ref 13–39)
BILIRUB SERPL-MCNC: 0.89 MG/DL (ref 0.2–1)
BUN SERPL-MCNC: 13 MG/DL (ref 5–25)
CALCIUM SERPL-MCNC: 8.9 MG/DL (ref 8.4–10.2)
CHLORIDE SERPL-SCNC: 103 MMOL/L (ref 96–108)
CO2 SERPL-SCNC: 29 MMOL/L (ref 21–32)
CREAT SERPL-MCNC: 0.86 MG/DL (ref 0.6–1.3)
ERYTHROCYTE [DISTWIDTH] IN BLOOD BY AUTOMATED COUNT: 12.5 % (ref 11.6–15.1)
GFR SERPL CREATININE-BSD FRML MDRD: 91 ML/MIN/1.73SQ M
GLUCOSE SERPL-MCNC: 114 MG/DL (ref 65–140)
HCT VFR BLD AUTO: 44.4 % (ref 34.8–46.1)
HGB BLD-MCNC: 14.6 G/DL (ref 11.5–15.4)
MCH RBC QN AUTO: 30.4 PG (ref 26.8–34.3)
MCHC RBC AUTO-ENTMCNC: 32.9 G/DL (ref 31.4–37.4)
MCV RBC AUTO: 92 FL (ref 82–98)
PLATELET # BLD AUTO: 301 THOUSANDS/UL (ref 149–390)
PMV BLD AUTO: 9.2 FL (ref 8.9–12.7)
POTASSIUM SERPL-SCNC: 4.4 MMOL/L (ref 3.5–5.3)
PROT SERPL-MCNC: 6.6 G/DL (ref 6.4–8.4)
RBC # BLD AUTO: 4.81 MILLION/UL (ref 3.81–5.12)
SODIUM SERPL-SCNC: 138 MMOL/L (ref 135–147)
WBC # BLD AUTO: 4.82 THOUSAND/UL (ref 4.31–10.16)

## 2022-05-09 PROCEDURE — 80053 COMPREHEN METABOLIC PANEL: CPT

## 2022-05-09 PROCEDURE — 85027 COMPLETE CBC AUTOMATED: CPT

## 2022-05-09 PROCEDURE — 76817 TRANSVAGINAL US OBSTETRIC: CPT

## 2022-05-09 PROCEDURE — 76816 OB US FOLLOW-UP PER FETUS: CPT

## 2022-05-23 ENCOUNTER — APPOINTMENT (OUTPATIENT)
Dept: LAB | Facility: CLINIC | Age: 32
End: 2022-05-23
Payer: COMMERCIAL

## 2022-05-23 ENCOUNTER — OFFICE VISIT (OUTPATIENT)
Dept: OBGYN CLINIC | Facility: CLINIC | Age: 32
End: 2022-05-23
Payer: COMMERCIAL

## 2022-05-23 VITALS
BODY MASS INDEX: 25.04 KG/M2 | DIASTOLIC BLOOD PRESSURE: 70 MMHG | HEIGHT: 70 IN | SYSTOLIC BLOOD PRESSURE: 104 MMHG | WEIGHT: 174.9 LBS

## 2022-05-23 DIAGNOSIS — E34.9 ELEVATED SERUM HCG IN FEMALE, NOT PREGNANT: ICD-10-CM

## 2022-05-23 DIAGNOSIS — O03.9 SPONTANEOUS ABORTION IN FIRST TRIMESTER: Primary | ICD-10-CM

## 2022-05-23 DIAGNOSIS — N83.201 RIGHT OVARIAN CYST: ICD-10-CM

## 2022-05-23 DIAGNOSIS — R87.610 ASCUS WITH POSITIVE HIGH RISK HPV CERVICAL: ICD-10-CM

## 2022-05-23 DIAGNOSIS — R87.810 ASCUS WITH POSITIVE HIGH RISK HPV CERVICAL: ICD-10-CM

## 2022-05-23 DIAGNOSIS — E34.9 ELEVATED SERUM HCG IN FEMALE, NOT PREGNANT: Primary | ICD-10-CM

## 2022-05-23 LAB — B-HCG SERPL-ACNC: <1 MIU/ML (ref 0–11.6)

## 2022-05-23 PROCEDURE — 84702 CHORIONIC GONADOTROPIN TEST: CPT

## 2022-05-23 PROCEDURE — 99213 OFFICE O/P EST LOW 20 MIN: CPT | Performed by: OBSTETRICS & GYNECOLOGY

## 2022-05-23 PROCEDURE — 36415 COLL VENOUS BLD VENIPUNCTURE: CPT

## 2022-05-23 NOTE — PROGRESS NOTES
Assessment/Plan:    Diagnoses and all orders for this visit:    Spontaneous  in first trimester    ASCUS with positive high risk HPV cervical    Right ovarian cyst        Subjective: here for follow-up in test results     Patient ID: Germaine Andrews is a 36 y o  female  HPI    74-year-old female  1 para 0010 here today for follow-up regarding completed spontaneous  in the 1st trimester  Patient was initially seen evaluated in early April for positive pregnancy test with heavy vaginal bleeding  Quantitative HCGs slow to resolve initially, followed weekly over the past several weeks  She did have a pelvic ultrasound done in late April which revealed normal size uterus, endometrial thickness at 10 mm  The right ovary measured 4 7 x 3 9 x 3 3 cm associated with 1 complex right ovarian hemorrhagic cyst measuring 3 2 x 3 6 x 2 9 cm  Left ovary was normal assist were identified multiple small ovarian follicles noted bilaterally  Serial quantitative HCGs were followed and recently became negative and normal   Follow-up pelvic ultrasound on May 9th resume healed normal size uterus again and measuring 8 7 x 2 9 x 5 cm  The prior right ovarian cyst complex has resolved  Left ovary was also normal   There was never any  Intrauterine gestation identified  patient declines any contraception at this time she will use condoms  Recommend follow-up annual exam which is due in August   Patient is recommended to avoid getting pregnant until after 3 normal cycles have occurred  Review of Systems    Unchanged    Objective: no acute distress  /70   Ht 5' 10" (1 778 m)   Wt 79 3 kg (174 lb 14 4 oz)   LMP 2022   BMI 25 10 kg/m²      Physical Exam  Vitals reviewed  Constitutional:       Appearance: Normal appearance  She is normal weight  Eyes:      Extraocular Movements: Extraocular movements intact  Pupils: Pupils are equal, round, and reactive to light     Pulmonary: Effort: Pulmonary effort is normal    Genitourinary:     Comments:  Pelvic exam deferred  Musculoskeletal:         General: Normal range of motion  Neurological:      Mental Status: She is alert and oriented to person, place, and time  Psychiatric:         Mood and Affect: Mood normal          Behavior: Behavior normal          Thought Content: Thought content normal          Judgment: Judgment normal         Please note    In addition to the time spent discussing the findings and results of today's visit and exam, I spent approximately 20  minutes of face-to-face time with the patient, greater than 50% of which was spent in counseling and coordination of care for this patient

## 2022-08-10 ENCOUNTER — OFFICE VISIT (OUTPATIENT)
Dept: FAMILY MEDICINE CLINIC | Facility: CLINIC | Age: 32
End: 2022-08-10
Payer: COMMERCIAL

## 2022-08-10 VITALS
SYSTOLIC BLOOD PRESSURE: 104 MMHG | WEIGHT: 168.8 LBS | BODY MASS INDEX: 23.63 KG/M2 | HEIGHT: 71 IN | RESPIRATION RATE: 16 BRPM | TEMPERATURE: 98.5 F | DIASTOLIC BLOOD PRESSURE: 60 MMHG | OXYGEN SATURATION: 98 % | HEART RATE: 82 BPM

## 2022-08-10 DIAGNOSIS — R06.09 DOE (DYSPNEA ON EXERTION): ICD-10-CM

## 2022-08-10 DIAGNOSIS — R01.1 CARDIAC MURMUR: Primary | ICD-10-CM

## 2022-08-10 DIAGNOSIS — R60.0 BILATERAL LEG EDEMA: ICD-10-CM

## 2022-08-10 PROCEDURE — 99204 OFFICE O/P NEW MOD 45 MIN: CPT | Performed by: NURSE PRACTITIONER

## 2022-08-10 RX ORDER — HYDROCHLOROTHIAZIDE 25 MG/1
25 TABLET ORAL DAILY
Qty: 30 TABLET | Refills: 0 | Status: SHIPPED | OUTPATIENT
Start: 2022-08-10 | End: 2022-09-11

## 2022-08-10 RX ORDER — ONDANSETRON 4 MG/1
TABLET, ORALLY DISINTEGRATING ORAL
COMMUNITY
Start: 2022-08-01

## 2022-08-10 NOTE — PROGRESS NOTES
FAMILY PRACTICE OFFICE VISIT       NAME: Flavio Pelaez  AGE: 36 y o  SEX: female       : 1990        MRN: [id number]    DATE: 2022  TIME: 1:18 PM    Assessment and Plan   1  Cardiac murmur  -     Comprehensive metabolic panel; Future  -     Iron Panel (Includes Ferritin, Iron Sat%, Iron, and TIBC); Future  -     CBC and differential; Future  -     Peripheral Smear; Future  -     TSH, 3rd generation with Free T4 reflex; Future  -     Echo complete w/ contrast if indicated; Future; Expected date: 08/10/2022  -     VAS reflux lower limb venous duplex study with reflux assessment, complete bilateral; Future; Expected date: 08/10/2022    2  Bilateral leg edema  -     Comprehensive metabolic panel; Future  -     Iron Panel (Includes Ferritin, Iron Sat%, Iron, and TIBC); Future  -     CBC and differential; Future  -     Peripheral Smear; Future  -     TSH, 3rd generation with Free T4 reflex; Future  -     Echo complete w/ contrast if indicated; Future; Expected date: 08/10/2022  -     VAS reflux lower limb venous duplex study with reflux assessment, complete bilateral; Future; Expected date: 08/10/2022  -     hydrochlorothiazide (HYDRODIURIL) 25 mg tablet; Take 1 tablet (25 mg total) by mouth daily    3  MOSER (dyspnea on exertion)  -     Comprehensive metabolic panel; Future  -     Iron Panel (Includes Ferritin, Iron Sat%, Iron, and TIBC); Future  -     CBC and differential; Future  -     Peripheral Smear; Future  -     TSH, 3rd generation with Free T4 reflex; Future  -     Echo complete w/ contrast if indicated;  Future; Expected date: 08/10/2022  -     VAS reflux lower limb venous duplex study with reflux assessment, complete bilateral; Future; Expected date: 08/10/2022               Chief Complaint     Chief Complaint   Patient presents with   [facility] Patient Visit    Follow-up     Swelling bottom part of both legs from below calf       History of Present Illness   Flavio Pelaez is a 36y o -year-old female who is here as a new patient  Works as RN at [employer]  Has been having intermittent swelling in her lower legs for the past 1 1/2 years but worsening recently  Painful and burning  Tried hctz with some relief  Noticed petechiae on her lower legs, no where else  Hx of cardiac murmur as a child, not sure still has  Recent miscarriage  Noticed MOSER lately      Review of Systems   Review of Systems   Constitutional: Negative for fatigue and unexpected weight change  HENT: Negative for congestion, rhinorrhea and sinus pain  Eyes: Negative for photophobia and visual disturbance  Respiratory: Positive for chest tightness and shortness of breath  Cardiovascular: Positive for leg swelling  Negative for chest pain and palpitations  Gastrointestinal: Negative for constipation and diarrhea  Genitourinary: Negative for dysuria and frequency  Musculoskeletal: Negative for arthralgias and myalgias  Skin: Negative for rash  Neurological: Negative for dizziness, light-headedness and headaches  Hematological: Negative for adenopathy  Psychiatric/Behavioral: Negative for dysphoric mood and sleep disturbance  The patient is not nervous/anxious          Active Problem List     Patient Active Problem List   Diagnosis    ASCUS with positive high risk HPV cervical    Gastroesophageal reflux disease without esophagitis    Spontaneous  in first trimester    Right ovarian cyst    Cardiac murmur    Bilateral leg edema    MOSER (dyspnea on exertion)         Past Medical History:  Past Medical History:   Diagnosis Date    Abnormal Pap smear of cervix     HPV (human papilloma virus) infection     Osteoarthritis     Varicella        Past Surgical History:  Past Surgical History:   Procedure Laterality Date    KNEE SURGERY         Family History:  Family History   Problem Relation Age of Onset    Multiple sclerosis Mother     Thyroid disease Mother     Hyperlipidemia Father     Breast cancer Cousin     Heart attack Maternal Grandfather     Heart attack Paternal Grandfather     Alzheimer's disease Paternal Grandfather        Social History:  Social History     Socioeconomic History    Marital status: Single     Spouse name: Not on file    Number of children: Not on file    Years of education: Not on file    Highest education level: Not on file   Occupational History    Not on file   Tobacco Use    Smoking status: Never Smoker    Smokeless tobacco: Never Used   Vaping Use    Vaping Use: Never used   Substance and Sexual Activity    Alcohol use: Yes     Alcohol/week: 2 0 standard drinks     Types: 2 Glasses of wine per week     Comment: socially    Drug use: No    Sexual activity: Yes     Partners: Male     Birth control/protection: Condom Male     Comment: sporadic   Other Topics Concern    Not on file   Social History Narrative    Not on file     Social Determinants of Health     Financial Resource Strain: Not on file   Food Insecurity: Not on file   Transportation Needs: Not on file   Physical Activity: Not on file   Stress: Not on file   Social Connections: Not on file   Intimate Partner Violence: Not on file   Housing Stability: Not on file       Objective     Vitals:    08/10/22 1815   BP: 104/60   Pulse: 82   Resp: 16   Temp: 98 5 °F (36 9 °C)   SpO2: 98%     Wt Readings from Last 3 Encounters:   08/10/22 76 6 kg (168 lb 12 8 oz)   05/23/22 79 3 kg (174 lb 14 4 oz)   05/02/22 76 7 kg (169 lb)       Physical Exam  Vitals and nursing note reviewed  Constitutional:       Appearance: Normal appearance  HENT:      Head: Normocephalic and atraumatic  Right Ear: Tympanic membrane, ear canal and external ear normal       Left Ear: Tympanic membrane, ear canal and external ear normal       Nose: Nose normal       Mouth/Throat:      Mouth: Mucous membranes are moist    Eyes:      Extraocular Movements: Extraocular movements intact        Conjunctiva/sclera: Conjunctivae normal  Pupils: Pupils are equal, round, and reactive to light  Neck:      Vascular: No carotid bruit  Cardiovascular:      Rate and Rhythm: Normal rate and regular rhythm  Pulses: Normal pulses  Heart sounds: Murmur heard  Systolic murmur is present with a grade of 2/6  Pulmonary:      Effort: Pulmonary effort is normal       Breath sounds: Normal breath sounds  Abdominal:      General: Bowel sounds are normal    Musculoskeletal:      Cervical back: Normal range of motion and neck supple  Right lower leg: Edema present  Left lower leg: Edema present  Skin:     General: Skin is warm and dry  Capillary Refill: Capillary refill takes less than 2 seconds  Neurological:      General: No focal deficit present  Mental Status: She is alert and oriented to person, place, and time  Psychiatric:         Mood and Affect: Mood normal          Behavior: Behavior normal          Thought Content:  Thought content normal          Judgment: Judgment normal          Pertinent Laboratory/Diagnostic Studies:  Lab Results   Component Value Date    BUN 13 05/09/2022    CREATININE 0 86 05/09/2022    CALCIUM 8 9 05/09/2022    K 4 4 05/09/2022    CO2 29 05/09/2022     05/09/2022     Lab Results   Component Value Date    ALT 14 05/09/2022    AST 15 05/09/2022    ALKPHOS 39 05/09/2022       Lab Results   Component Value Date    WBC 4 82 05/09/2022    HGB 14 6 05/09/2022    HCT 44 4 05/09/2022    MCV 92 05/09/2022     05/09/2022       No results found for: TSH    No results found for: CHOL  Lab Results   Component Value Date    TRIG 93 04/11/2022     Lab Results   Component Value Date    HDL 68 04/11/2022     Lab Results   Component Value Date    LDLCALC 112 (H) 04/11/2022     Lab Results   Component Value Date    HGBA1C 5 2 04/11/2022       Results for orders placed or performed in visit on 05/23/22   hCG, quantitative   Result Value Ref Range    HCG, Quant <1 0 - 11 6 mIU/mL       Orders Placed This Encounter   Procedures    Comprehensive metabolic panel    CBC and differential    Peripheral Smear    TSH, 3rd generation with Free T4 reflex    Echo complete w/ contrast if indicated       ALLERGIES:  No Known Allergies    Current Medications     Current Outpatient Medications   Medication Sig Dispense Refill    Ascorbic Acid (VITAMIN C PO) Take by mouth      ferrous sulfate 325 (65 FE) MG EC tablet Take 325 mg by mouth 3 (three) times a day with meals      hydrochlorothiazide (HYDRODIURIL) 25 mg tablet Take 1 tablet (25 mg total) by mouth daily 30 tablet 0    Multiple Vitamin (MULTI-VITAMIN PO) Take by mouth      ondansetron (ZOFRAN-ODT) 4 mg disintegrating tablet DISSOLVE 1 TABLET BY MOUTH EVERY 8 HOURS AS NEEDED FOR NAUSEA/ VOMITING       No current facility-administered medications for this visit  Health Maintenance     Health Maintenance   Topic Date Due    DTaP,Tdap,and Td Vaccines (1 - Tdap) Never done    Influenza Vaccine (1) 09/01/2022    Annual Physical  09/30/2022    Depression Screening  08/10/2023    BMI: Adult  08/10/2023    Cervical Cancer Screening  08/23/2024    HIV Screening  Completed    Hepatitis C Screening  Completed    COVID-19 Vaccine  Completed    Pneumococcal Vaccine: Pediatrics (0 to 5 Years) and At-Risk Patients (6 to 59 Years)  Aged Out    HIB Vaccine  Aged Out    Hepatitis B Vaccine  Aged Out    IPV Vaccine  Aged Out    Hepatitis A Vaccine  Aged Out    Meningococcal ACWY Vaccine  Aged Out    HPV Vaccine  Aged [employer] History   Administered Date(s) Administered    COVID-19 PFIZER VACCINE 0 3 ML IM 12/22/2020, 01/10/2021, 10/06/2021, 10/06/2021    Typhoid, Unspecified 04/11/2012    Typhoid, ViCPs 04/11/2012       Depression Screening and Follow-up Plan: Patient was screened for depression during today's encounter  They screened negative with a PHQ-2 score of 0          DIANA Dow  1

## 2022-08-11 PROBLEM — R01.1 CARDIAC MURMUR: Status: ACTIVE | Noted: 2022-08-11

## 2022-08-11 PROBLEM — R60.0 BILATERAL LEG EDEMA: Status: ACTIVE | Noted: 2022-08-11

## 2022-08-11 PROBLEM — R06.00 DOE (DYSPNEA ON EXERTION): Status: ACTIVE | Noted: 2022-08-11

## 2022-08-11 PROBLEM — R06.09 DOE (DYSPNEA ON EXERTION): Status: ACTIVE | Noted: 2022-08-11

## 2022-08-20 ENCOUNTER — APPOINTMENT (OUTPATIENT)
Dept: LAB | Facility: HOSPITAL | Age: 32
End: 2022-08-20
Payer: COMMERCIAL

## 2022-08-20 DIAGNOSIS — R06.09 DOE (DYSPNEA ON EXERTION): ICD-10-CM

## 2022-08-20 DIAGNOSIS — R01.1 CARDIAC MURMUR: ICD-10-CM

## 2022-08-20 DIAGNOSIS — R60.0 BILATERAL LEG EDEMA: ICD-10-CM

## 2022-08-20 LAB
ALBUMIN SERPL BCP-MCNC: 4.5 G/DL (ref 3.5–5)
ALP SERPL-CCNC: 43 U/L (ref 34–104)
ALT SERPL W P-5'-P-CCNC: 15 U/L (ref 7–52)
ANION GAP SERPL CALCULATED.3IONS-SCNC: 9 MMOL/L (ref 4–13)
AST SERPL W P-5'-P-CCNC: 20 U/L (ref 13–39)
BASOPHILS # BLD AUTO: 0.05 THOUSANDS/ΜL (ref 0–0.1)
BASOPHILS NFR BLD AUTO: 1 % (ref 0–1)
BILIRUB SERPL-MCNC: 1.29 MG/DL (ref 0.2–1)
BUN SERPL-MCNC: 15 MG/DL (ref 5–25)
CALCIUM SERPL-MCNC: 9.8 MG/DL (ref 8.4–10.2)
CHLORIDE SERPL-SCNC: 99 MMOL/L (ref 96–108)
CO2 SERPL-SCNC: 29 MMOL/L (ref 21–32)
CREAT SERPL-MCNC: 0.91 MG/DL (ref 0.6–1.3)
EOSINOPHIL # BLD AUTO: 0.18 THOUSAND/ΜL (ref 0–0.61)
EOSINOPHIL NFR BLD AUTO: 4 % (ref 0–6)
ERYTHROCYTE [DISTWIDTH] IN BLOOD BY AUTOMATED COUNT: 12.1 % (ref 11.6–15.1)
FERRITIN SERPL-MCNC: 16 NG/ML (ref 8–388)
GFR SERPL CREATININE-BSD FRML MDRD: 85 ML/MIN/1.73SQ M
GLUCOSE P FAST SERPL-MCNC: 86 MG/DL (ref 65–99)
HCT VFR BLD AUTO: 44 % (ref 34.8–46.1)
HGB BLD-MCNC: 14.5 G/DL (ref 11.5–15.4)
IMM GRANULOCYTES # BLD AUTO: 0.01 THOUSAND/UL (ref 0–0.2)
IMM GRANULOCYTES NFR BLD AUTO: 0 % (ref 0–2)
IRON SATN MFR SERPL: 19 % (ref 15–50)
IRON SERPL-MCNC: 71 UG/DL (ref 50–170)
LYMPHOCYTES # BLD AUTO: 1.73 THOUSANDS/ΜL (ref 0.6–4.47)
LYMPHOCYTES NFR BLD AUTO: 33 % (ref 14–44)
MCH RBC QN AUTO: 30 PG (ref 26.8–34.3)
MCHC RBC AUTO-ENTMCNC: 33 G/DL (ref 31.4–37.4)
MCV RBC AUTO: 91 FL (ref 82–98)
MONOCYTES # BLD AUTO: 0.49 THOUSAND/ΜL (ref 0.17–1.22)
MONOCYTES NFR BLD AUTO: 9 % (ref 4–12)
NEUTROPHILS # BLD AUTO: 2.74 THOUSANDS/ΜL (ref 1.85–7.62)
NEUTS SEG NFR BLD AUTO: 53 % (ref 43–75)
NRBC BLD AUTO-RTO: 0 /100 WBCS
PLATELET # BLD AUTO: 289 THOUSANDS/UL (ref 149–390)
PMV BLD AUTO: 9.6 FL (ref 8.9–12.7)
POTASSIUM SERPL-SCNC: 4.1 MMOL/L (ref 3.5–5.3)
PROT SERPL-MCNC: 7.2 G/DL (ref 6.4–8.4)
RBC # BLD AUTO: 4.83 MILLION/UL (ref 3.81–5.12)
SODIUM SERPL-SCNC: 137 MMOL/L (ref 135–147)
TIBC SERPL-MCNC: 383 UG/DL (ref 250–450)
TSH SERPL DL<=0.05 MIU/L-ACNC: 2.92 UIU/ML (ref 0.45–4.5)
WBC # BLD AUTO: 5.2 THOUSAND/UL (ref 4.31–10.16)

## 2022-08-20 PROCEDURE — 84443 ASSAY THYROID STIM HORMONE: CPT

## 2022-08-20 PROCEDURE — 85025 COMPLETE CBC W/AUTO DIFF WBC: CPT

## 2022-08-20 PROCEDURE — 80053 COMPREHEN METABOLIC PANEL: CPT

## 2022-08-20 PROCEDURE — 83540 ASSAY OF IRON: CPT

## 2022-08-20 PROCEDURE — 82728 ASSAY OF FERRITIN: CPT

## 2022-08-20 PROCEDURE — 36415 COLL VENOUS BLD VENIPUNCTURE: CPT

## 2022-08-20 PROCEDURE — 83550 IRON BINDING TEST: CPT

## 2022-08-25 ENCOUNTER — HOSPITAL ENCOUNTER (OUTPATIENT)
Dept: NON INVASIVE DIAGNOSTICS | Facility: CLINIC | Age: 32
Discharge: HOME | End: 2022-08-25
Payer: COMMERCIAL

## 2022-08-25 VITALS
HEART RATE: 82 BPM | DIASTOLIC BLOOD PRESSURE: 60 MMHG | BODY MASS INDEX: 23.52 KG/M2 | HEIGHT: 71 IN | WEIGHT: 168 LBS | SYSTOLIC BLOOD PRESSURE: 104 MMHG

## 2022-08-25 DIAGNOSIS — R60.0 BILATERAL LEG EDEMA: ICD-10-CM

## 2022-08-25 DIAGNOSIS — R01.1 CARDIAC MURMUR: ICD-10-CM

## 2022-08-25 DIAGNOSIS — R06.09 DOE (DYSPNEA ON EXERTION): ICD-10-CM

## 2022-08-25 LAB
AORTIC ROOT: 3 CM
APICAL FOUR CHAMBER EJECTION FRACTION: 75 %
ASCENDING AORTA: 3 CM
E WAVE DECELERATION TIME: 234 MS
FRACTIONAL SHORTENING: 38 % (ref 28–44)
INTERVENTRICULAR SEPTUM IN DIASTOLE (PARASTERNAL SHORT AXIS VIEW): 0.9 CM
INTERVENTRICULAR SEPTUM: 0.9 CM (ref 0.6–1.1)
LAAS-AP2: 12 CM2
LAAS-AP4: 12 CM2
LEFT ATRIUM SIZE: 3.7 CM
LEFT INTERNAL DIMENSION IN SYSTOLE: 3 CM (ref 2.1–4)
LEFT VENTRICULAR INTERNAL DIMENSION IN DIASTOLE: 4.8 CM (ref 3.5–6)
LEFT VENTRICULAR POSTERIOR WALL IN END DIASTOLE: 0.9 CM
LEFT VENTRICULAR STROKE VOLUME: 72 ML
LVSV (TEICH): 72 ML
MV E'TISSUE VEL-SEP: 16 CM/S
MV PEAK A VEL: 0.36 M/S
MV PEAK E VEL: 97 CM/S
MV STENOSIS PRESSURE HALF TIME: 68 MS
MV VALVE AREA P 1/2 METHOD: 3.24 CM2
RIGHT ATRIUM AREA SYSTOLE A4C: 11.6 CM2
SL CV LEFT ATRIUM LENGTH A2C: 3.8 CM
SL CV LV EF: 60
SL CV PED ECHO LEFT VENTRICLE DIASTOLIC VOLUME (MOD BIPLANE) 2D: 107 ML
SL CV PED ECHO LEFT VENTRICLE SYSTOLIC VOLUME (MOD BIPLANE) 2D: 35 ML
TR MAX PG: 15 MMHG
TR PEAK VELOCITY: 2 M/S
TRICUSPID VALVE PEAK REGURGITATION VELOCITY: 1.97 M/S

## 2022-08-25 PROCEDURE — 93970 EXTREMITY STUDY: CPT

## 2022-08-25 PROCEDURE — 93306 TTE W/DOPPLER COMPLETE: CPT | Performed by: INTERNAL MEDICINE

## 2022-08-25 PROCEDURE — 93306 TTE W/DOPPLER COMPLETE: CPT

## 2022-08-25 PROCEDURE — 93970 EXTREMITY STUDY: CPT | Performed by: SURGERY

## 2022-09-06 ENCOUNTER — ANNUAL EXAM (OUTPATIENT)
Dept: GYNECOLOGY | Facility: CLINIC | Age: 32
End: 2022-09-06
Payer: COMMERCIAL

## 2022-09-06 VITALS
BODY MASS INDEX: 24.45 KG/M2 | HEIGHT: 70 IN | SYSTOLIC BLOOD PRESSURE: 100 MMHG | DIASTOLIC BLOOD PRESSURE: 64 MMHG | WEIGHT: 170.8 LBS

## 2022-09-06 DIAGNOSIS — Z86.19 HISTORY OF HPV INFECTION: ICD-10-CM

## 2022-09-06 DIAGNOSIS — N76.0 BV (BACTERIAL VAGINOSIS): ICD-10-CM

## 2022-09-06 DIAGNOSIS — B96.89 BV (BACTERIAL VAGINOSIS): ICD-10-CM

## 2022-09-06 DIAGNOSIS — Z87.59 HISTORY OF SPONTANEOUS ABORTION: ICD-10-CM

## 2022-09-06 DIAGNOSIS — Z01.419 WOMEN'S ANNUAL ROUTINE GYNECOLOGICAL EXAMINATION: ICD-10-CM

## 2022-09-06 PROCEDURE — S0612 ANNUAL GYNECOLOGICAL EXAMINA: HCPCS | Performed by: OBSTETRICS & GYNECOLOGY

## 2022-09-06 PROCEDURE — 57150 TREAT VAGINA INFECTION: CPT | Performed by: OBSTETRICS & GYNECOLOGY

## 2022-09-06 RX ORDER — METRONIDAZOLE 500 MG/1
500 TABLET ORAL EVERY 12 HOURS SCHEDULED
Qty: 14 TABLET | Refills: 0 | Status: SHIPPED | OUTPATIENT
Start: 2022-09-06 | End: 2022-09-13

## 2022-09-06 NOTE — PROGRESS NOTES
Assessment     Annual well-woman exam    History of spontaneous  in May this past year    History of HPV infection in the past    Vaginal discharge    Bacterial vaginosis        Plan      Metronidazole for BV    Pap smear deferred, next Pap due in    All questions answered  Subjective      Candy Simons is a 29 y o  female nulliparous, who presents for annual exam  Periods are regular every 28-30 days, lasting 5 days  Dysmenorrhea:none  Cyclic symptoms include none  No intermenstrual bleeding,  No pelvic pain symptoms she does however complain of vaginal discharge occasionally notices an odor    The patient reports that there is not domestic violence in her life  Current contraception: condoms  History of abnormal Pap smear: yes -   History of high-risk HPV in the past  Family history of uterine or ovarian cancer: no  Regular self breast exam: yes  History of abnormal mammogram: no  Family history of breast cancer: no  History of abnormal lipids: no  Menstrual History:  OB History        1    Para   0    Term   0       0    AB   1    Living   0       SAB   1    IAB   0    Ectopic   0    Multiple   0    Live Births   0                Menarche age: 17  Patient's last menstrual period was 2022 (exact date)  Review of Systems  Pertinent items are noted in HPI        Objective  No acute distress   /64   Ht 5' 10" (1 778 m)   Wt 77 5 kg (170 lb 12 8 oz)   LMP 2022 (Exact Date)   BMI 24 51 kg/m²     General:   alert and oriented, in no acute distress, alert, appears stated age and cooperative   Heart: regular rate and rhythm, S1, S2 normal, no murmur, click, rub or gallop   Lungs: clear to auscultation bilaterally   Abdomen: soft, non-tender, without masses or organomegaly   Vulva: normal, Bartholin's, Urethra, [place]'s normal, female escutcheon   Vagina: normal mucosa, normal discharge, thin grey discharge, wet prep done, clue cells identified consistent with bacterial vaginosis   Cervix: anteverted, no cervical motion tenderness, no lesions and nulliparous appearance  Pap smear deferred this year next Pap due in 2024   Uterus: normal size, anteverted, mobile, non-tender, normal shape and consistency   Adnexa: normal adnexa and no mass, fullness, tenderness   Bilateral breast exam in the sitting and supine position with chaperone present, no visible or palpable breast lesions identified  No breast masses noted  No supraclavicular or axillary lymphadenopathy noted  No nipple discharge  Reviewed self-breast exam techniques     Rectal exam,  deferred No

## 2022-09-11 DIAGNOSIS — R60.0 BILATERAL LEG EDEMA: ICD-10-CM

## 2022-09-11 RX ORDER — HYDROCHLOROTHIAZIDE 25 MG/1
25 TABLET ORAL DAILY
Qty: 30 TABLET | Refills: 0 | Status: SHIPPED | OUTPATIENT
Start: 2022-09-11 | End: 2022-10-18

## 2022-10-05 ENCOUNTER — OFFICE VISIT (OUTPATIENT)
Dept: FAMILY MEDICINE CLINIC | Facility: CLINIC | Age: 32
End: 2022-10-05
Payer: COMMERCIAL

## 2022-10-05 VITALS
OXYGEN SATURATION: 99 % | HEART RATE: 68 BPM | BODY MASS INDEX: 24.74 KG/M2 | WEIGHT: 172.8 LBS | SYSTOLIC BLOOD PRESSURE: 118 MMHG | HEIGHT: 70 IN | RESPIRATION RATE: 16 BRPM | DIASTOLIC BLOOD PRESSURE: 70 MMHG | TEMPERATURE: 97.1 F

## 2022-10-05 DIAGNOSIS — R60.0 LOWER EXTREMITY EDEMA: Primary | ICD-10-CM

## 2022-10-05 PROCEDURE — 99213 OFFICE O/P EST LOW 20 MIN: CPT | Performed by: NURSE PRACTITIONER

## 2022-10-05 NOTE — PROGRESS NOTES
FAMILY PRACTICE OFFICE VISIT       NAME: Sima Best  AGE: 29 y o  SEX: female       : 1990        MRN: [id number]    DATE: 10/10/2022  TIME: 9:32 AM    Assessment and Plan   1  Lower extremity edema  -     Ambulatory Referral to Vascular Surgery; Future                 Chief Complaint     Chief Complaint   Patient presents with   • Follow-up     Patient is here for f/u to LE edema         History of Present Illness   Sima Best is a 29y o -year-old female who is here for f/u to le edema  Does have incompetent veins bilaterally  Does wish to see vascular for any advice/opinion  Wearing compression stockings daily  Echo wnl  Labs wnl        Review of Systems   Review of Systems   Constitutional: Negative for fatigue and fever  HENT: Negative for congestion, postnasal drip and rhinorrhea  Respiratory: Negative for cough, shortness of breath and wheezing  Cardiovascular: Positive for leg swelling  Gastrointestinal: Negative for constipation and diarrhea  Musculoskeletal: Negative for arthralgias and myalgias  Skin: Negative for rash  Neurological: Negative for dizziness and headaches  Hematological: Negative for adenopathy  Psychiatric/Behavioral: Negative for dysphoric mood and sleep disturbance  The patient is not nervous/anxious          Active Problem List     Patient Active Problem List   Diagnosis   • ASCUS with positive high risk HPV cervical   • Gastroesophageal reflux disease without esophagitis   • Spontaneous  in first trimester   • Right ovarian cyst   • Cardiac murmur   • Lower extremity edema   • MOSER (dyspnea on exertion)         Past Medical History:  Past Medical History:   Diagnosis Date   • Abnormal Pap smear of cervix    • HPV (human papilloma virus) infection    • Osteoarthritis    • Varicella        Past Surgical History:  Past Surgical History:   Procedure Laterality Date   • KNEE SURGERY         Family History:  Family History   Problem Relation Age of Onset • Multiple sclerosis Mother    • Thyroid disease Mother    • Hyperlipidemia Father    • Breast cancer Cousin    • Heart attack Maternal Grandfather    • Heart attack Paternal Grandfather    • Alzheimer's disease Paternal Grandfather        Social History:  Social History     Socioeconomic History   • Marital status: Single     Spouse name: Not on file   • Number of children: Not on file   • Years of education: Not on file   • Highest education level: Not on file   Occupational History   • Not on file   Tobacco Use   • Smoking status: Never Smoker   • Smokeless tobacco: Never Used   Vaping Use   • Vaping Use: Never used   Substance and Sexual Activity   • Alcohol use: Yes     Alcohol/week: 2 0 standard drinks     Types: 2 Glasses of wine per week     Comment: socially   • Drug use: No   • Sexual activity: Yes     Partners: Male     Birth control/protection: Condom Male     Comment: sporadic   Other Topics Concern   • Not on file   Social History Narrative   • Not on file     Social Determinants of Health     Financial Resource Strain: Not on file   Food Insecurity: Not on file   Transportation Needs: Not on file   Physical Activity: Not on file   Stress: Not on file   Social Connections: Not on file   Intimate Partner Violence: Not on file   Housing Stability: Not on file       Objective     Vitals:    10/05/22 1723   BP: 118/70   Pulse: 68   Resp: 16   Temp: (!) 97 1 °F (36 2 °C)   SpO2: 99%     Wt Readings from Last 3 Encounters:   10/05/22 78 4 kg (172 lb 12 8 oz)   09/06/22 77 5 kg (170 lb 12 8 oz)   08/25/22 76 2 kg (168 lb)       Physical Exam  Vitals and nursing note reviewed  Constitutional:       Appearance: Normal appearance  HENT:      Head: Normocephalic and atraumatic  Right Ear: Tympanic membrane normal       Left Ear: Tympanic membrane normal       Nose: Nose normal       Mouth/Throat:      Mouth: Mucous membranes are moist    Eyes:      Extraocular Movements: Extraocular movements intact  Conjunctiva/sclera: Conjunctivae normal    Cardiovascular:      Rate and Rhythm: Normal rate and regular rhythm  Pulses: Normal pulses  Pulmonary:      Effort: Pulmonary effort is normal       Breath sounds: Normal breath sounds  Abdominal:      General: Bowel sounds are normal    Musculoskeletal:         General: Normal range of motion  Cervical back: Normal range of motion and neck supple  Neurological:      General: No focal deficit present  Mental Status: She is alert and oriented to person, place, and time     Psychiatric:         Mood and Affect: Mood normal          Behavior: Behavior normal          Pertinent Laboratory/Diagnostic Studies:  Lab Results   Component Value Date    BUN 15 08/20/2022    CREATININE 0 91 08/20/2022    CALCIUM 9 8 08/20/2022    K 4 1 08/20/2022    CO2 29 08/20/2022    CL 99 08/20/2022     Lab Results   Component Value Date    ALT 15 08/20/2022    AST 20 08/20/2022    ALKPHOS 43 08/20/2022       Lab Results   Component Value Date    WBC 5 20 08/20/2022    HGB 14 5 08/20/2022    HCT 44 0 08/20/2022    MCV 91 08/20/2022     08/20/2022       No results found for: TSH    No results found for: CHOL  Lab Results   Component Value Date    TRIG 93 04/11/2022     Lab Results   Component Value Date    HDL 68 04/11/2022     Lab Results   Component Value Date    LDLCALC 112 (H) 04/11/2022     Lab Results   Component Value Date    HGBA1C 5 2 04/11/2022       Results for orders placed or performed during the hospital encounter of 08/25/22   Echo complete w/ contrast if indicated   Result Value Ref Range    LA size 3 7 cm    Triscuspid Valve Regurgitation Peak Gradient 15 0 mmHg    Tricuspid valve peak regurgitation velocity 1 97 m/s    LVPWd 0 90 cm    Left Atrium Area-systolic Apical Two Chamber 12 cm2    Left Atrium Area-systolic Four Chamber 12 cm2    MV E' Tissue Velocity Septal 16 cm/s    TR Peak [place] 2 0 m/s    IVSd 9 82 cm    LV DIASTOLIC VOLUME (MOD BIPLANE) 2D 107 mL    LEFT VENTRICLE SYSTOLIC VOLUME (MOD BIPLANE) 2D 35 mL    Left ventricular stroke volume (2D) 72 00 mL    A4C EF 75 %    LA length (A2C) 3 80 cm    LVIDd 4 80 cm    IVS 0 9 cm    LVIDS 3 00 cm    FS 38 28 - 44 %    Asc Ao 3 cm    Ao root 3 00 cm    MV valve area p 1/2 method 3 24 cm2    E wave deceleration time 234 ms    MV Peak E [place] 97 cm/s    MV Peak A [place] 0 36 m/s    RAA A4C 11 6 cm2    MV stenosis pressure 1/2 time 68 ms    LVSV, 2D 72 mL    LV EF 60        Orders Placed This Encounter   Procedures   • Ambulatory Referral to Vascular Surgery       ALLERGIES:  No Known Allergies    Current Medications     Current Outpatient Medications   Medication Sig Dispense Refill   • Ascorbic Acid (VITAMIN C PO) Take by mouth     • ferrous sulfate 325 (65 FE) MG EC tablet Take 325 mg by mouth 3 (three) times a day with meals     • hydrochlorothiazide (HYDRODIURIL) 25 mg tablet TAKE 1 TABLET (25 MG TOTAL) BY MOUTH DAILY  30 tablet 0   • Multiple Vitamin (MULTI-VITAMIN PO) Take by mouth     • ondansetron (ZOFRAN-ODT) 4 mg disintegrating tablet DISSOLVE 1 TABLET BY MOUTH EVERY 8 HOURS AS NEEDED FOR NAUSEA/ VOMITING       No current facility-administered medications for this visit           Health Maintenance     Health Maintenance   Topic Date Due   • Annual Physical  09/30/2022   • Influenza Vaccine (1) 06/30/2023 (Originally 9/1/2022)   • DTaP,Tdap,and Td Vaccines (1 - Tdap) 10/05/2023 (Originally 8/22/2013)   • Depression Screening  08/10/2023   • BMI: Adult  10/05/2023   • Cervical Cancer Screening  08/23/2026   • HIV Screening  Completed   • Hepatitis C Screening  Completed   • COVID-19 Vaccine  Completed   • Pneumococcal Vaccine: Pediatrics (0 to 5 Years) and At-Risk Patients (6 to 59 Years)  Aged Out   • HIB Vaccine  Aged Out   • Hepatitis B Vaccine  Aged Out   • IPV Vaccine  Aged Out   • Hepatitis A Vaccine  Aged Out   • Meningococcal ACWY Vaccine  Aged Out   • HPV Vaccine  Aged [employer] History Administered Date(s) Administered   • COVID-19 PFIZER VACCINE 0 3 ML IM 12/22/2020, 01/10/2021, 10/06/2021, 10/06/2021   • Typhoid, Unspecified 04/11/2012   • Typhoid, ViCPs 04/11/2012          Joesph Bear

## 2022-10-18 DIAGNOSIS — R60.0 BILATERAL LEG EDEMA: ICD-10-CM

## 2022-10-18 RX ORDER — HYDROCHLOROTHIAZIDE 25 MG/1
25 TABLET ORAL DAILY
Qty: 30 TABLET | Refills: 0 | Status: SHIPPED | OUTPATIENT
Start: 2022-10-18 | End: 2022-10-18

## 2022-11-09 ENCOUNTER — CONSULT (OUTPATIENT)
Dept: VASCULAR SURGERY | Facility: CLINIC | Age: 32
End: 2022-11-09

## 2022-11-09 VITALS
HEART RATE: 84 BPM | BODY MASS INDEX: 23.62 KG/M2 | SYSTOLIC BLOOD PRESSURE: 90 MMHG | HEIGHT: 70 IN | DIASTOLIC BLOOD PRESSURE: 66 MMHG | WEIGHT: 165 LBS

## 2022-11-09 DIAGNOSIS — R60.0 LOWER EXTREMITY EDEMA: ICD-10-CM

## 2022-11-09 NOTE — PROGRESS NOTES
Assessment/Plan:    Lower extremity edema  Lower extremity edema  Venous duplex shows some deep reflux bilaterally and GSV reflux but vein too small to be causing significant contribution to edema    No surgical options  Continue wearing stockings and follow up with Vascular PRN       Diagnoses and all orders for this visit:    Lower extremity edema  -     Ambulatory Referral to Vascular Surgery  -     Compression Stocking          Subjective:      Patient ID: Emre Maza is a 29 y o  female  New patient, presents for evaluation of BLE swelling  She also repots associated aching and heaviness  Wearing compression daily  Reflux study done 8/25  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  No prior venous surgery or DVT  She does wear compression and finds that it helps  HPI    The following portions of the patient's history were reviewed and updated as appropriate: allergies, current medications, past family history, past medical history, past social history, past surgical history and problem list     Review of Systems   Constitutional: Negative  HENT: Negative  Eyes: Negative  Respiratory: Negative  Cardiovascular: Positive for leg swelling  Gastrointestinal: Negative  Endocrine: Negative  Genitourinary: Negative  Musculoskeletal: Negative  Skin: Negative  Allergic/Immunologic: Negative  Neurological: Negative  Hematological: Negative  Psychiatric/Behavioral: Negative  I have reviewed the ROS above and made changes as needed          Objective:      BP 90/66 (BP Location: Right arm, Patient Position: Sitting)   Pulse 84   Ht 5' 10" (1 778 m)   Wt 74 8 kg (165 lb)   BMI 23 68 kg/m²          Physical Exam      General  Exam: alert, awake, oriented, no distress, consistent with stated age    Integumentary  Exam: warm, dry, no gross lesions, no bruises and normal color    Head and Neck  Exam: supple, no bruits, trachea midline, no JVD, no mass or palpable nodes    Eye  Exam: extraoccular movements intact, no scleral icterus, sclera clear, pupils equal round and reactive to light    ENMT  Exam: oral mucosa pink and moist    Chest and Lung  Exam: chest normal without deformity, bilaterally expansive, clear to auscultation    Cardiovascular  Exam: regular rate, regular rhythm, no murmurs, no rubs or gallops    Adbomen  Exam: soft, non-tender, non-distended, no pulsatile abdominal masses, no abdominal bruit    Peripheral Vascular  Exam: no clubbing of the digits of the upper extremity, no cyanosis, both feet are warm, radial pulses 2+ bilaterally, skin well perfused, without and no varicosities      No widened popliteal pulse noted bilaterally      Few scattered thigh spider veins and mild bilateral LE edema    Upper Extremity:  Palpation: Radial pulse- Bilateral 2+    Lower Extremity:  Palpation: Femoral pulse- Bilateral 2+         Popliteal pulse - Bilateral 2+        Dorsalis Pedis - Bilateral 2+        Posterior tibial - Bilateral 2+    Neurologic  Exam:alert, non-focal, oriented x 3, cranial nerves II-XII grossly intact

## 2022-11-09 NOTE — ASSESSMENT & PLAN NOTE
Lower extremity edema  Venous duplex shows some deep reflux bilaterally and GSV reflux but vein too small to be causing significant contribution to edema    No surgical options  Continue wearing stockings and follow up with Vascular PRN

## 2022-12-14 ENCOUNTER — OFFICE VISIT (OUTPATIENT)
Dept: FAMILY MEDICINE CLINIC | Facility: CLINIC | Age: 32
End: 2022-12-14

## 2022-12-14 VITALS
HEART RATE: 94 BPM | DIASTOLIC BLOOD PRESSURE: 62 MMHG | HEIGHT: 71 IN | TEMPERATURE: 96.9 F | WEIGHT: 167.6 LBS | SYSTOLIC BLOOD PRESSURE: 98 MMHG | BODY MASS INDEX: 23.46 KG/M2 | OXYGEN SATURATION: 97 % | RESPIRATION RATE: 16 BRPM

## 2022-12-14 DIAGNOSIS — Z00.00 ANNUAL PHYSICAL EXAM: Primary | ICD-10-CM

## 2022-12-14 NOTE — PATIENT INSTRUCTIONS

## 2022-12-14 NOTE — PROGRESS NOTES
[facility]ACT[facility]    NAME: Og Jacobson  AGE: 29 y o  SEX: female  : 1990     DATE: 2022     Assessment and Plan:     Problem List Items Addressed This Visit    None  Visit Diagnoses     Annual physical exam    -  Primary          Immunizations and preventive care screenings were discussed with patient today  Appropriate education was printed on patient's after visit summary  Counseling:  Alcohol/drug use: discussed moderation in alcohol intake, the recommendations for healthy alcohol use, and avoidance of illicit drug use  Dental Health: discussed importance of regular tooth brushing, flossing, and dental visits  Injury prevention: discussed safety/seat belts, safety helmets, smoke detectors, carbon dioxide detectors, and smoking near bedding or upholstery  Sexual health: discussed sexually transmitted diseases, partner selection, use of condoms, avoidance of unintended pregnancy, and contraceptive alternatives  Exercise: the importance of regular exercise/physical activity was discussed  Recommend exercise 3-5 times per week for at least 30 minutes  Return in about 1 year (around 2023) for Annual physical      Chief Complaint:     Chief Complaint   Patient presents with   • Physical Exam      History of Present Illness:     Adult Annual Physical   Patient here for a comprehensive physical exam  The patient reports no problems  Diet and Physical Activity  Diet/Nutrition: well balanced diet  Exercise: 3-4 times a week on average  Depression Screening  PHQ-2/9 Depression Screening         General Health  Sleep: sleeps well  Hearing: normal - bilateral   Vision: wears glasses  Dental: regular dental visits  /GYN Health  Last menstrual period: 2022  Contraceptive method: none    History of STDs?: no      Review of Systems:     Review of Systems   Constitutional: Negative for fatigue and fever    HENT: Negative for congestion, postnasal drip and rhinorrhea  Eyes: Negative for photophobia and visual disturbance  Respiratory: Negative for cough, shortness of breath and wheezing  Cardiovascular: Negative for chest pain and palpitations  Gastrointestinal: Negative for abdominal distention, constipation and diarrhea  Genitourinary: Negative for dysuria and frequency  Musculoskeletal: Negative for arthralgias and myalgias  Skin: Negative for rash  Neurological: Negative for dizziness, light-headedness and headaches  Hematological: Negative for adenopathy  Psychiatric/Behavioral: Negative for dysphoric mood and sleep disturbance  The patient is not nervous/anxious  Past Medical History:     Past Medical History:   Diagnosis Date   • Abnormal Pap smear of cervix    • HPV (human papilloma virus) infection    • Osteoarthritis    • Varicella       Past Surgical History:     Past Surgical History:   Procedure Laterality Date   • KNEE SURGERY        Social History:     Social History     Socioeconomic History   • Marital status: Single     Spouse name: None   • Number of children: None   • Years of education: None   • Highest education level: None   Occupational History   • None   Tobacco Use   • Smoking status: Never   • Smokeless tobacco: Never   Vaping Use   • Vaping Use: Never used   Substance and Sexual Activity   • Alcohol use:  Yes     Alcohol/week: 2 0 standard drinks     Types: 2 Glasses of wine per week     Comment: socially   • Drug use: No   • Sexual activity: Yes     Partners: Male     Birth control/protection: Condom Male     Comment: sporadic   Other Topics Concern   • None   Social History Narrative   • None     Social Determinants of Health     Financial Resource Strain: Not on file   Food Insecurity: Not on file   Transportation Needs: Not on file   Physical Activity: Not on file   Stress: Not on file   Social Connections: Not on file   Intimate Partner Violence: Not on file   Housing Stability: Not on file      Family History:     Family History   Problem Relation Age of Onset   • Multiple sclerosis Mother    • Thyroid disease Mother    • Hyperlipidemia Father    • Breast cancer Cousin    • Heart attack Maternal Grandfather    • Heart attack Paternal Grandfather    • Alzheimer's disease Paternal Grandfather       Current Medications:     Current Outpatient Medications   Medication Sig Dispense Refill   • Ascorbic Acid (VITAMIN C PO) Take by mouth     • ferrous sulfate 325 (65 FE) MG EC tablet Take 325 mg by mouth 3 (three) times a day with meals     • hydrochlorothiazide (HYDRODIURIL) 25 mg tablet TAKE 1 TABLET (25 MG TOTAL) BY MOUTH DAILY  30 tablet 5   • Multiple Vitamin (MULTI-VITAMIN PO) Take by mouth     • ondansetron (ZOFRAN-ODT) 4 mg disintegrating tablet DISSOLVE 1 TABLET BY MOUTH EVERY 8 HOURS AS NEEDED FOR NAUSEA/ VOMITING       No current facility-administered medications for this visit  Allergies:     No Known Allergies   Physical Exam:     BP 98/62   Pulse 94   Temp (!) 96 9 °F (36 1 °C)   Resp 16   Ht 5' 10 71" (1 796 m)   Wt 76 kg (167 lb 9 6 oz)   LMP 12/03/2022 (Exact Date)   SpO2 97%   BMI 23 57 kg/m²     Physical Exam  Vitals and nursing note reviewed  Constitutional:       Appearance: Normal appearance  HENT:      Head: Normocephalic and atraumatic  Right Ear: Tympanic membrane, ear canal and external ear normal       Left Ear: Tympanic membrane, ear canal and external ear normal       Nose: Nose normal       Mouth/Throat:      Mouth: Mucous membranes are moist       Pharynx: Oropharynx is clear  Eyes:      Extraocular Movements: Extraocular movements intact  Conjunctiva/sclera: Conjunctivae normal       Pupils: Pupils are equal, round, and reactive to light  Cardiovascular:      Rate and Rhythm: Normal rate and regular rhythm  Pulses: Normal pulses  Heart sounds: Normal heart sounds     Pulmonary:      Effort: Pulmonary effort is normal       Breath sounds: Normal breath sounds  Abdominal:      General: Bowel sounds are normal       Palpations: Abdomen is soft  Musculoskeletal:         General: Normal range of motion  Cervical back: Normal range of motion and neck supple  Skin:     General: Skin is warm and dry  Capillary Refill: Capillary refill takes less than 2 seconds  Neurological:      General: No focal deficit present  Mental Status: She is alert and oriented to person, place, and time  Psychiatric:         Mood and Affect: Mood normal          Behavior: Behavior normal          Thought Content:  Thought content normal          Judgment: Judgment normal           Natalie Monsivais, [facility]

## 2022-12-16 PROBLEM — R06.09 DOE (DYSPNEA ON EXERTION): Status: RESOLVED | Noted: 2022-08-11 | Resolved: 2022-12-16

## 2023-04-28 ENCOUNTER — APPOINTMENT (OUTPATIENT)
Dept: LAB | Facility: HOSPITAL | Age: 33
End: 2023-04-28

## 2023-04-28 DIAGNOSIS — Z00.8 HEALTH EXAMINATION IN POPULATION SURVEY: ICD-10-CM

## 2023-04-28 LAB
CHOLEST SERPL-MCNC: 147 MG/DL
HDLC SERPL-MCNC: 51 MG/DL
LDLC SERPL CALC-MCNC: 85 MG/DL (ref 0–100)
NONHDLC SERPL-MCNC: 96 MG/DL
TRIGL SERPL-MCNC: 56 MG/DL

## 2023-05-01 LAB
EST. AVERAGE GLUCOSE BLD GHB EST-MCNC: 100 MG/DL
HBA1C MFR BLD: 5.1 %

## 2023-05-21 DIAGNOSIS — R60.0 BILATERAL LEG EDEMA: ICD-10-CM

## 2023-05-21 RX ORDER — HYDROCHLOROTHIAZIDE 25 MG/1
25 TABLET ORAL DAILY
Qty: 30 TABLET | Refills: 5 | Status: SHIPPED | OUTPATIENT
Start: 2023-05-21

## 2023-08-10 ENCOUNTER — APPOINTMENT (OUTPATIENT)
Dept: RADIOLOGY | Facility: AMBULARY SURGERY CENTER | Age: 33
End: 2023-08-10
Attending: ORTHOPAEDIC SURGERY
Payer: COMMERCIAL

## 2023-08-10 ENCOUNTER — OFFICE VISIT (OUTPATIENT)
Dept: OBGYN CLINIC | Facility: CLINIC | Age: 33
End: 2023-08-10
Payer: COMMERCIAL

## 2023-08-10 VITALS
HEART RATE: 97 BPM | SYSTOLIC BLOOD PRESSURE: 141 MMHG | HEIGHT: 71 IN | WEIGHT: 167.6 LBS | DIASTOLIC BLOOD PRESSURE: 79 MMHG | BODY MASS INDEX: 23.46 KG/M2

## 2023-08-10 DIAGNOSIS — M23.41 LOOSE BODY IN KNEE, RIGHT: ICD-10-CM

## 2023-08-10 DIAGNOSIS — M25.461 EFFUSION OF RIGHT KNEE: ICD-10-CM

## 2023-08-10 DIAGNOSIS — M25.361 PATELLAR INSTABILITY OF RIGHT KNEE: ICD-10-CM

## 2023-08-10 DIAGNOSIS — M25.461 EFFUSION OF RIGHT KNEE: Primary | ICD-10-CM

## 2023-08-10 PROCEDURE — 99214 OFFICE O/P EST MOD 30 MIN: CPT | Performed by: ORTHOPAEDIC SURGERY

## 2023-08-10 PROCEDURE — 73562 X-RAY EXAM OF KNEE 3: CPT

## 2023-08-10 PROCEDURE — 20610 DRAIN/INJ JOINT/BURSA W/O US: CPT | Performed by: ORTHOPAEDIC SURGERY

## 2023-08-10 RX ORDER — BUPIVACAINE HYDROCHLORIDE 2.5 MG/ML
8 INJECTION, SOLUTION INFILTRATION; PERINEURAL
Status: COMPLETED | OUTPATIENT
Start: 2023-08-10 | End: 2023-08-10

## 2023-08-10 RX ORDER — TRIAMCINOLONE ACETONIDE 40 MG/ML
40 INJECTION, SUSPENSION INTRA-ARTICULAR; INTRAMUSCULAR
Status: COMPLETED | OUTPATIENT
Start: 2023-08-10 | End: 2023-08-10

## 2023-08-10 RX ADMIN — TRIAMCINOLONE ACETONIDE 40 MG: 40 INJECTION, SUSPENSION INTRA-ARTICULAR; INTRAMUSCULAR at 11:15

## 2023-08-10 RX ADMIN — BUPIVACAINE HYDROCHLORIDE 8 ML: 2.5 INJECTION, SOLUTION INFILTRATION; PERINEURAL at 11:15

## 2023-08-10 NOTE — PROGRESS NOTES
Assessment:       1. Effusion of right knee    2. Patellar instability of right knee    3. Loose body in knee, right          Plan:        Explained my current clinical findings and reviewed the radiological findings with the patient today. She has some acute on chronic symptoms with right knee effusion in the presence of chronic patellar instability. Her plain radiograph does reveal intra-articular loose bodies which likely resulted in her current symptom exacerbation with recent prolonged walking. In this regard we discussed both surgical as well as conservative management options. I did recommend the patient to follow-up with Dr. Romel Muir for further surgical discussion. However, at this time due to her work commitment the patient would like to have symptomatic relief and hence a right knee aspiration followed by corticosteroid injection was performed on today's visit. She is recommended to follow-up with Dr. Romel Muir in this regard after 6 to 8 weeks. Patient expressed understanding and was in agreement with the treatment plan. Subjective:     Patient ID: Andrei Stanton is a 29 y.o. female. Chief Complaint: Right knee swelling    GANESH Castanon is a 82-year-old lady who presents today for evaluation of right knee swelling. She has a known history of recurrent right knee patellar dislocation with multiple episodes in the past and has been previously under the care of Dr. Rubina Birmingham whom she last saw about 2 years ago. An MRI of the right knee performed on 1/2/2021 revealed lateral patellar subluxation with grade 4 chondral defect in the lateral patellar facet and associated subchondral edema. There was also suspected high-grade chondrosis along the periphery of the superior lateral trochlea. The MPFL appeared thinned. She was also noted to have patella igor. Patient was treated conservatively through physical therapy.   She has continued to function normally but does complain of persistent right patellar instability. Most recently she did a prolonged period of walking at the [employer] and subsequently developed some right knee swelling. She denies any insect bites, tick bites, history of Lyme disease, warmth of the right knee or systemic symptoms like fever or chills. She denies any locking episodes of the right knee. Past surgical intervention with regards to the right knee includes lateral release performed by Dr. Kristal Vallejo at [facility] in 2007. Social History     Occupational History   • Not on file   Tobacco Use   • Smoking status: Never   • Smokeless tobacco: Never   Vaping Use   • Vaping Use: Never used   Substance and Sexual Activity   • Alcohol use: Yes     Alcohol/week: 2.0 standard drinks of alcohol     Types: 2 Glasses of wine per week     Comment: socially   • Drug use: No   • Sexual activity: Yes     Partners: Male     Birth control/protection: Condom Male     Comment: sporadic      Review of Systems        Objective:     Right Knee Exam     Tenderness   The patient is experiencing no tenderness. Range of Motion   Extension: normal   Flexion: 130     Tests   Vee:  Medial - negative Lateral - negative  Varus: negative Valgus: negative  Lachman:  Anterior - negative      Drawer:  Anterior - negative    Posterior - negative  Patellar apprehension: positive (There is increased lateral patellar translation)    Other   Erythema: absent  Swelling: moderate  Effusion: effusion present    Comments:  No overlying erythema or palpable warmth. Observations     Right Knee   Positive for effusion. Physical Exam  Vitals and nursing note reviewed. Constitutional:       Appearance: She is well-developed. HENT:      Head: Normocephalic and atraumatic. Cardiovascular:      Rate and Rhythm: Normal rate. Pulmonary:      Effort: Pulmonary effort is normal. No respiratory distress. Musculoskeletal:      Right knee: Effusion present.       Instability Tests: Medial Vee test negative and lateral Vee test negative. Skin:     General: Skin is warm and dry. Findings: No erythema. Neurological:      Mental Status: She is alert and oriented to person, place, and time. Cranial Nerves: No cranial nerve deficit. Psychiatric:         Behavior: Behavior normal.         Thought Content: Thought content normal.         Judgment: Judgment normal.             I have personally reviewed pertinent films in PACS and my interpretation is Plain radiograph of the right knee performed today reveals some patellofemoral arthritic change noted in the merchant view as well as presence of intra-articular loose body. Large joint arthrocentesis: R knee  Universal Protocol:  Consent: Verbal consent obtained. Risks and benefits: risks, benefits and alternatives were discussed  Consent given by: patient  Patient understanding: patient states understanding of the procedure being performed  Site marked: the operative site was marked  Radiology Images displayed and confirmed.  If images not available, report reviewed: imaging studies available  Required items: required blood products, implants, devices, and special equipment available  Patient identity confirmed: verbally with patient    Supporting Documentation  Indications: pain and joint swelling   Procedure Details  Location: knee - R knee  Preparation: Patient was prepped and draped in the usual sterile fashion  Needle size: 18 G  Ultrasound guidance: no  Approach: Superolateral.  Medications administered: 8 mL bupivacaine 0.25 %; 40 mg triamcinolone acetonide 40 mg/mL    Aspirate amount: 30 mL  Aspirate: clear    Patient tolerance: patient tolerated the procedure well with no immediate complications  Dressing:  Sterile dressing applied

## 2023-08-10 NOTE — PROGRESS NOTES
Chief Complaint: R knee pain    HPI:    Lisa Julian is 28 yo F presents with R knee pain and patellar instability. Hx of MCL repair in  after tripping over her two dogs with multiple subluxations and dislocations in the past. Previously seen by Dr. Hailey Dexter in 2021. Hx of swelling in the knee and had multiple taps (30-50cc) in the ED and one steroid injection. Has completed PT in the past.    Pain ususally flares up after overuse -patient spend the weekend at [employer] and at a wedding where she was dancing for most of the night. Currently she complains of no point tenderness but does note that it is worse with kneeling and squatting. At baseline her patella is very unstable but she has noticed swelling since this weekend. She is able to bear weight and do her daily routine. She had a history of a steroid injection    Athlete: No    Injury related: Yes, orignal injury from fall over 10 years ago. Symptoms worsened with overuse over the weekend. Description of symptoms: as above    Summary of treatment to-date: as above    I have personally reviewed pertinent films in PACS. Patient Active Problem List   Diagnosis   • ASCUS with positive high risk HPV cervical   • Gastroesophageal reflux disease without esophagitis   • Spontaneous  in first trimester   • Right ovarian cyst   • Cardiac murmur   • Lower extremity edema        Current Outpatient Medications on File Prior to Visit   Medication Sig Dispense Refill   • Ascorbic Acid (VITAMIN C PO) Take by mouth     • ferrous sulfate 325 (65 FE) MG EC tablet Take 325 mg by mouth 3 (three) times a day with meals     • hydrochlorothiazide (HYDRODIURIL) 25 mg tablet TAKE 1 TABLET (25 MG TOTAL) BY MOUTH DAILY.  30 tablet 5   • Multiple Vitamin (MULTI-VITAMIN PO) Take by mouth     • ondansetron (ZOFRAN-ODT) 4 mg disintegrating tablet DISSOLVE 1 TABLET BY MOUTH EVERY 8 HOURS AS NEEDED FOR NAUSEA/ VOMITING       No current facility-administered medications on file prior to visit. No Known Allergies     Tobacco Use: Low Risk  (8/10/2023)    Patient History    • Smoking Tobacco Use: Never    • Smokeless Tobacco Use: Never    • Passive Exposure: Not on file        Social Determinants of Health     Tobacco Use: Low Risk  (8/10/2023)    Patient History    • Smoking Tobacco Use: Never    • Smokeless Tobacco Use: Never    • Passive Exposure: Not on file   Alcohol Use: Not on file   Financial Resource Strain: Not on file   Food Insecurity: Not on file   Transportation Needs: Not on file   Physical Activity: Not on file   Stress: Not on file   Social Connections: Not on file   Intimate Partner Violence: Not on file   Depression: Not at risk (8/10/2022)    PHQ-2    • PHQ-2 Score: 0   Housing Stability: Not on file          Review of Systems   Musculoskeletal: Positive for arthralgias (R knee pain). Negative for myalgias. Body mass index is 23.57 kg/m². Physical Exam     Ortho Exam:    Body part: right knee    Inspection: no erythema    Palpation: crepitus, swelling    Range of motion: FROM     Special Tests: negative varus/valgus/Vee/anterior drawer/posterior drawer    Distal Neurovascular Status: Intact, Yes; strength 5/5    Procedures       Assessment:    No diagnosis found. Plan:    ***    Follow-Up:    ***        Portions of the record may have been created with voice recognition software. Occasional wrong word or "sound alike" substitutions may have occurred due to the inherent limitations of voice recognition software. Please review the chart carefully and recognize, using context, where substitutions/typographical errors may have occurred.

## 2023-08-10 NOTE — PATIENT INSTRUCTIONS
Patient was advised to keep the injection area clean and dry for the next 24 hours. Patient was also advised to avoid getting into a bathtub, swimming pool were bodies of water for 2-3 days. Patient may resume regular activities as tolerated and use local ice application or over-the-counter pain medications if there is any discomfort. Advised to call us back immediately or go to the emergency room if there is any significant pain, swelling, redness, warmth or symptoms like fever or chills.

## 2023-09-12 ENCOUNTER — ANNUAL EXAM (OUTPATIENT)
Dept: GYNECOLOGY | Facility: CLINIC | Age: 33
End: 2023-09-12
Payer: COMMERCIAL

## 2023-09-12 VITALS
SYSTOLIC BLOOD PRESSURE: 110 MMHG | DIASTOLIC BLOOD PRESSURE: 68 MMHG | WEIGHT: 164 LBS | HEIGHT: 70 IN | BODY MASS INDEX: 23.48 KG/M2

## 2023-09-12 DIAGNOSIS — Z11.3 SCREENING FOR STDS (SEXUALLY TRANSMITTED DISEASES): ICD-10-CM

## 2023-09-12 DIAGNOSIS — Z01.419 WOMEN'S ANNUAL ROUTINE GYNECOLOGICAL EXAMINATION: ICD-10-CM

## 2023-09-12 DIAGNOSIS — I10 ESSENTIAL HYPERTENSION: ICD-10-CM

## 2023-09-12 PROCEDURE — S0612 ANNUAL GYNECOLOGICAL EXAMINA: HCPCS | Performed by: OBSTETRICS & GYNECOLOGY

## 2023-09-12 PROCEDURE — 87591 N.GONORRHOEAE DNA AMP PROB: CPT | Performed by: OBSTETRICS & GYNECOLOGY

## 2023-09-12 PROCEDURE — 87491 CHLMYD TRACH DNA AMP PROBE: CPT | Performed by: OBSTETRICS & GYNECOLOGY

## 2023-09-12 NOTE — PROGRESS NOTES
Assessment   Annual well woman exam  Essential hypertension  Anxiety  And history of breast cancer, first cousin  Declines birth control  Reviewed self breast exam techniques        Plan here for annual exam  Reviewed self breast exam techniques   All questions answered. Subjective here for annual exam     Sunny Pedroza is a 34 y.o. female who presents for annual exam. Periods are regular every 28-30 days, lasting 5 days. Dysmenorrhea:none. Cyclic symptoms include anxiety. No intermenstrual bleeding, spotting, or discharge. The patient reports that there is not domestic violence in her life. Current contraception: none  History of abnormal Pap smear: no  Family history of uterine or ovarian cancer: no  Regular self breast exam: yes  History of abnormal mammogram: no  Family history of breast cancer: yes -first cousin  History of abnormal lipids: no  Menstrual History:  OB History        1    Para   0    Term   0       0    AB   1    Living   0       SAB   1    IAB   0    Ectopic   0    Multiple   0    Live Births   0                Menarche age: 17  No LMP recorded. Review of Systems  Pertinent items are noted in HPI.       Objective no acute distress   /68 (BP Location: Left arm, Patient Position: Sitting)   Ht 5' 10.25" (1.784 m)   Wt 74.4 kg (164 lb)   BMI 23.36 kg/m²     General:   alert and oriented, in no acute distress, alert, appears stated age and cooperative   Heart: regular rate and rhythm, S1, S2 normal, no murmur, click, rub or gallop   Lungs: clear to auscultation bilaterally   Abdomen: soft, non-tender, without masses or organomegaly   Vulva: normal, Bartholin's, Urethra, Fairdale's normal, female escutcheon   Vagina: normal mucosa, normal discharge, no palpable nodules   Cervix: anteverted, no cervical motion tenderness, no lesions and nulliparous appearance   Uterus: normal size, anteverted, mobile, non-tender, normal shape and consistency   Adnexa: normal adnexa and no mass, fullness, tenderness   Bilateral breast exam in the sitting and supine position with chaperone present, no visible or palpable breast lesions identified. No breast masses noted. No supraclavicular or axillary lymphadenopathy noted. No nipple discharge. Reviewed self-breast exam techniques.    Rectal exam,  deferred

## 2023-09-14 LAB
C TRACH DNA SPEC QL NAA+PROBE: NEGATIVE
N GONORRHOEA DNA SPEC QL NAA+PROBE: NEGATIVE

## 2023-10-02 ENCOUNTER — OFFICE VISIT (OUTPATIENT)
Dept: OBGYN CLINIC | Facility: MEDICAL CENTER | Age: 33
End: 2023-10-02
Payer: COMMERCIAL

## 2023-10-02 VITALS
SYSTOLIC BLOOD PRESSURE: 111 MMHG | WEIGHT: 164.2 LBS | BODY MASS INDEX: 23.51 KG/M2 | HEART RATE: 88 BPM | HEIGHT: 70 IN | DIASTOLIC BLOOD PRESSURE: 74 MMHG

## 2023-10-02 DIAGNOSIS — M22.2X1 PATELLOFEMORAL DISORDER OF RIGHT KNEE: ICD-10-CM

## 2023-10-02 DIAGNOSIS — M25.361 PATELLAR INSTABILITY OF RIGHT KNEE: Primary | ICD-10-CM

## 2023-10-02 PROCEDURE — 99213 OFFICE O/P EST LOW 20 MIN: CPT | Performed by: ORTHOPAEDIC SURGERY

## 2023-10-02 NOTE — PROGRESS NOTES
Ortho Sports Medicine Knee Follow Up Visit     Assesment:     34 y.o. female right knee patellar instability    Plan:    MRI of the right knee ordered to assess chondral defect and plan for future mpfl    Conservative treatment:    Ice to knee for 20 minutes at least 1-2 times daily. PT for ROM/strengthening to knee, hip and core. OTC NSAIDS prn for pain. Imaging: All imaging from today was reviewed by myself and explained to the patient. Follow up in 1-2 weeks for MRI review. Will make a definitive treatment plan based on the results of the MRI. Injection:    No Injection planned at this time. Surgery:     No surgery is recommended at this point, continue with conservative treatment plan as noted. Follow up:    No follow-ups on file. Chief Complaint   Patient presents with   • Right Knee - Follow-up       History of Present Illness: The patient is returns for follow up right knee patellar instability. Since the prior visit, She reports minimal improvement. Pain is located anterior, deep. Pain is improved by rest, NSAIDS, bracing and injection. Pain is aggravated by squatting, weight bearing and running. Symptoms include cracking and swelling. The patient has tried rest, NSAIDS, physical therapy and injection. Knee Surgical History:  None    Past Medical, Social and Family History:  Past Medical History:   Diagnosis Date   • Abnormal Pap smear of cervix    • HPV (human papilloma virus) infection    • Miscarriage    • Osteoarthritis    • Varicella      Past Surgical History:   Procedure Laterality Date   • KNEE SURGERY       No Known Allergies  Current Outpatient Medications on File Prior to Visit   Medication Sig Dispense Refill   • Ascorbic Acid (VITAMIN C PO) Take by mouth     • hydrochlorothiazide (HYDRODIURIL) 25 mg tablet TAKE 1 TABLET (25 MG TOTAL) BY MOUTH DAILY.  30 tablet 5   • Multiple Vitamin (MULTI-VITAMIN PO) Take by mouth     • ondansetron (ZOFRAN-ODT) 4 mg disintegrating tablet DISSOLVE 1 TABLET BY MOUTH EVERY 8 HOURS AS NEEDED FOR NAUSEA/ VOMITING       No current facility-administered medications on file prior to visit. Social History     Socioeconomic History   • Marital status: Single     Spouse name: Not on file   • Number of children: Not on file   • Years of education: Not on file   • Highest education level: Not on file   Occupational History   • Not on file   Tobacco Use   • Smoking status: Never   • Smokeless tobacco: Never   Vaping Use   • Vaping Use: Never used   Substance and Sexual Activity   • Alcohol use: Yes     Comment: Socially   • Drug use: Never   • Sexual activity: Yes     Partners: Male     Birth control/protection: Condom Male     Comment: sporadic   Other Topics Concern   • Not on file   Social History Narrative   • Not on file     Social Determinants of Health     Financial Resource Strain: Not on file   Food Insecurity: Not on file   Transportation Needs: Not on file   Physical Activity: Not on file   Stress: Not on file   Social Connections: Not on file   Intimate Partner Violence: Not on file   Housing Stability: Not on file         I have reviewed the past medical, surgical, social and family history, medications and allergies as documented in the EMR. Review of systems: ROS is negative other than that noted in the HPI. Constitutional: Negative for fatigue and fever. Physical Exam:    Blood pressure 111/74, pulse 88, height 5' 10.25" (1.784 m), weight 74.5 kg (164 lb 3.2 oz), not currently breastfeeding.     General/Constitutional: NAD, well developed, well nourished  HENT: Normocephalic, atraumatic  CV: Intact distal pulses, regular rate  Resp: No respiratory distress or labored breathing  GI: Soft and non-tender   Lymphatic: No lymphadenopathy palpated  Neuro: Alert and Oriented x 3, no focal deficits  Psych: Normal mood, normal affect, normal judgement, normal behavior  Skin: Warm, dry, no rashes, no erythema      Knee Exam (focused): RIGHT LEFT   ROM:   0-130 0-130   Palpation: Effusion negative negative     MJL tenderness Negative Negative     LJL tenderness Negative Negative   Meniscus:  Vee Negative Negative    Apley's Compression Negative Negative   Instability: Varus stable stable     Valgus stable stable   Special Tests: Lachman Negative Negative     Posterior drawer Negative Negative     Anterior drawer Negative Negative     Pivot shift not tested not tested     Dial not tested not tested   Patella: Palpation lateral facet ttp no tenderness     Mobility 3/4 1/4     Apprehension Negative Negative   Other: Single leg 1/4 squat not tested not tested           LE NV Exam: +2 DP/PT pulses bilaterally  Sensation intact to light touch L2-S1 bilaterally    No calf tenderness to palpation bilaterally      Knee Imaging    No imaging was performed today      Scribe Attestation    I,:   am acting as a scribe while in the presence of the attending physician.:       I,:   personally performed the services described in this documentation    as scribed in my presence.:

## 2023-10-12 DIAGNOSIS — N76.0 BV (BACTERIAL VAGINOSIS): Primary | ICD-10-CM

## 2023-10-12 DIAGNOSIS — B96.89 BV (BACTERIAL VAGINOSIS): Primary | ICD-10-CM

## 2023-10-12 RX ORDER — METRONIDAZOLE 500 MG/1
500 TABLET ORAL EVERY 12 HOURS SCHEDULED
Qty: 14 TABLET | Refills: 0 | Status: SHIPPED | OUTPATIENT
Start: 2023-10-12 | End: 2023-10-19

## 2023-10-18 ENCOUNTER — HOSPITAL ENCOUNTER (OUTPATIENT)
Dept: MRI IMAGING | Facility: HOSPITAL | Age: 33
Discharge: HOME | End: 2023-10-18
Payer: COMMERCIAL

## 2023-10-18 DIAGNOSIS — M25.361 PATELLAR INSTABILITY OF RIGHT KNEE: ICD-10-CM

## 2023-10-18 DIAGNOSIS — M22.2X1 PATELLOFEMORAL DISORDER OF RIGHT KNEE: ICD-10-CM

## 2023-10-18 PROCEDURE — 73721 MRI JNT OF LWR EXTRE W/O DYE: CPT

## 2023-10-18 PROCEDURE — G1004 CDSM NDSC: HCPCS

## 2023-11-06 ENCOUNTER — APPOINTMENT (OUTPATIENT)
Dept: LAB | Facility: MEDICAL CENTER | Age: 33
End: 2023-11-06
Payer: COMMERCIAL

## 2023-11-06 ENCOUNTER — OFFICE VISIT (OUTPATIENT)
Dept: OBGYN CLINIC | Facility: MEDICAL CENTER | Age: 33
End: 2023-11-06
Payer: COMMERCIAL

## 2023-11-06 VITALS
WEIGHT: 162.4 LBS | DIASTOLIC BLOOD PRESSURE: 72 MMHG | HEART RATE: 71 BPM | SYSTOLIC BLOOD PRESSURE: 120 MMHG | HEIGHT: 70 IN | BODY MASS INDEX: 23.25 KG/M2

## 2023-11-06 DIAGNOSIS — S83.31XA TEAR OF ARTICULAR CARTILAGE OF KNEE AS CURRENT INJURY, RIGHT, INITIAL ENCOUNTER: ICD-10-CM

## 2023-11-06 DIAGNOSIS — M25.361 PATELLAR INSTABILITY OF RIGHT KNEE: ICD-10-CM

## 2023-11-06 DIAGNOSIS — Z01.818 PRE-OP TESTING: ICD-10-CM

## 2023-11-06 DIAGNOSIS — M22.01 RECURRENT DISLOCATION OF RIGHT PATELLA: ICD-10-CM

## 2023-11-06 DIAGNOSIS — S83.31XA TEAR OF ARTICULAR CARTILAGE OF KNEE AS CURRENT INJURY, RIGHT, INITIAL ENCOUNTER: Primary | ICD-10-CM

## 2023-11-06 DIAGNOSIS — Z11.3 SCREENING FOR STDS (SEXUALLY TRANSMITTED DISEASES): ICD-10-CM

## 2023-11-06 LAB
ANION GAP SERPL CALCULATED.3IONS-SCNC: 4 MMOL/L
BUN SERPL-MCNC: 11 MG/DL (ref 5–25)
CALCIUM SERPL-MCNC: 9.8 MG/DL (ref 8.4–10.2)
CHLORIDE SERPL-SCNC: 100 MMOL/L (ref 96–108)
CO2 SERPL-SCNC: 35 MMOL/L (ref 21–32)
CREAT SERPL-MCNC: 0.82 MG/DL (ref 0.6–1.3)
ERYTHROCYTE [DISTWIDTH] IN BLOOD BY AUTOMATED COUNT: 11.8 % (ref 11.6–15.1)
GFR SERPL CREATININE-BSD FRML MDRD: 95 ML/MIN/1.73SQ M
GLUCOSE SERPL-MCNC: 98 MG/DL (ref 65–140)
HBV SURFACE AG SER QL: NORMAL
HCT VFR BLD AUTO: 43.7 % (ref 34.8–46.1)
HCV AB SER QL: NORMAL
HGB BLD-MCNC: 14.9 G/DL (ref 11.5–15.4)
MCH RBC QN AUTO: 31.9 PG (ref 26.8–34.3)
MCHC RBC AUTO-ENTMCNC: 34.1 G/DL (ref 31.4–37.4)
MCV RBC AUTO: 94 FL (ref 82–98)
PLATELET # BLD AUTO: 263 THOUSANDS/UL (ref 149–390)
PMV BLD AUTO: 10.7 FL (ref 8.9–12.7)
POTASSIUM SERPL-SCNC: 3.8 MMOL/L (ref 3.5–5.3)
RBC # BLD AUTO: 4.67 MILLION/UL (ref 3.81–5.12)
SODIUM SERPL-SCNC: 139 MMOL/L (ref 135–147)
TREPONEMA PALLIDUM IGG+IGM AB [PRESENCE] IN SERUM OR PLASMA BY IMMUNOASSAY: NORMAL
WBC # BLD AUTO: 6.75 THOUSAND/UL (ref 4.31–10.16)

## 2023-11-06 PROCEDURE — 87536 HIV-1 QUANT&REVRSE TRNSCRPJ: CPT

## 2023-11-06 PROCEDURE — 80048 BASIC METABOLIC PNL TOTAL CA: CPT

## 2023-11-06 PROCEDURE — 99214 OFFICE O/P EST MOD 30 MIN: CPT | Performed by: ORTHOPAEDIC SURGERY

## 2023-11-06 PROCEDURE — 86780 TREPONEMA PALLIDUM: CPT

## 2023-11-06 PROCEDURE — 85027 COMPLETE CBC AUTOMATED: CPT

## 2023-11-06 PROCEDURE — 87340 HEPATITIS B SURFACE AG IA: CPT

## 2023-11-06 PROCEDURE — 36415 COLL VENOUS BLD VENIPUNCTURE: CPT

## 2023-11-06 PROCEDURE — 86803 HEPATITIS C AB TEST: CPT

## 2023-11-06 RX ORDER — TRAMADOL HYDROCHLORIDE 50 MG/1
50 TABLET ORAL EVERY 6 HOURS PRN
OUTPATIENT
Start: 2023-11-06

## 2023-11-06 RX ORDER — CEFAZOLIN SODIUM 2 G/50ML
2000 SOLUTION INTRAVENOUS ONCE
OUTPATIENT
Start: 2023-11-06 | End: 2023-11-06

## 2023-11-06 RX ORDER — ACETAMINOPHEN 325 MG/1
650 TABLET ORAL EVERY 6 HOURS PRN
OUTPATIENT
Start: 2023-11-06

## 2023-11-06 NOTE — PROGRESS NOTES
Ortho Sports Medicine Knee Follow Up Visit     Assesment:     34 y.o. 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 Post-op PT written  Work note written     Will hold on crutches, patient believes she has a pair at home    Imaging: All imaging from today was reviewed by myself and explained to the patient. Injection:    No Injection planned at this time. Surgery:     Had lengthy discussion with patient that the surgical plan is to be completed in 2 surgeries. First being a diagnostic arthroscopy to access if the patient is a candidate to undergo ACI of the patella. If she is a candidate then a cartilage biopsy will be performed for future NGA procedure. The second procedure will be in 6-8 weeks including open arthrotomy NGA procedure of the patella and open MPFL reconstruction with allograft. All of the risks and benefits of operative treatment were explained to the patient, as well as the risks and benefits of any alternative treatment options, including nonoperative care. The risks of surgical treatement include, but are not limited to, infection, bleeding, blood clot, neurovascular damage, need for further surgery, continued pain, cardiovascular risk, and anesthesia risk. The patient understood this and elects to proceed forward with surgical intervention. We will proceed forward with surgical arthroscopy of the knee with chondroplasty and possible cartilage biopsy    Patient Denies history of blood clot. Patient denies personal or family history of bleeding or clotting disorder. Patient does not take any blood thinners. Patient has cardiac history including  cardiac murmur, this is managed by her PCP. She did undergo recent ECHO on 8/25/22 . Patient denies  high blood pressure. Patient is on Hydrodiuril for lower leg edema. She underwent a venous duplex in November 2022  that demonstrated some deep reflux bilaterally and GSV reflux. Patient does not  see a cardiologist. Patient Denies  current history of diabetes. Patient denies allergies to antibiotics. Patient denies history of MRSA infection. Patient Denies current tobacco use. Discussed with patient use of narcotics for post-op pain. Patient denies history of opioid abuse. Medical clearance will be obtained. Bloodwork will be obtained. EKG will be obtained. Follow up:    Return for 1 week post-op with [facility]. Chief Complaint   Patient presents with    Right Knee - Follow-up     MRI results       History of Present Illness: The patient is returns for follow up of Right knee MRI. Since the prior visit, She reports no improvement. Patient continues to have patellar instability and pain anteriorly. Patient reports that the right kneecap has dislocated fully at least 4 times but states that she has frequent subluxations daily. Patient reports that she is unable to do leg extensions while at the gym due to patellar slipping. 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 Pain is located anterior. Pain is improved by rest.  Pain is aggravated by stairs, squatting, weight bearing, walking, and pivoting on a planted foot. Symptoms include swelling and dislocation of patella. The patient has tried rest, ice, NSAIDS, physical therapy, bracing, and prior surgical intervention of right knee lateral release and VMO tightening performed by Dr. Angeles Tan at [facility] in 2007. Knee Surgical History:  right knee lateral release and VMO tightening performed by Dr. Angeles Tan at [facility] in 2007.      Past Medical, Social and Family History:  Past Medical History:   Diagnosis Date    Abnormal Pap smear of cervix     HPV (human papilloma virus) infection Miscarriage     Osteoarthritis     Varicella      Past Surgical History:   Procedure Laterality Date    KNEE SURGERY       No Known Allergies  Current Outpatient Medications on File Prior to Visit   Medication Sig Dispense Refill    Ascorbic Acid (VITAMIN C PO) Take by mouth      hydrochlorothiazide (HYDRODIURIL) 25 mg tablet TAKE 1 TABLET (25 MG TOTAL) BY MOUTH DAILY. 30 tablet 5    Multiple Vitamin (MULTI-VITAMIN PO) Take by mouth      ondansetron (ZOFRAN-ODT) 4 mg disintegrating tablet DISSOLVE 1 TABLET BY MOUTH EVERY 8 HOURS AS NEEDED FOR NAUSEA/ VOMITING       No current facility-administered medications on file prior to visit. Social History     Socioeconomic History    Marital status: Single     Spouse name: Not on file    Number of children: Not on file    Years of education: Not on file    Highest education level: Not on file   Occupational History    Not on file   Tobacco Use    Smoking status: Never    Smokeless tobacco: Never   Vaping Use    Vaping Use: Never used   Substance and Sexual Activity    Alcohol use: Yes     Comment: Socially    Drug use: Never    Sexual activity: Yes     Partners: Male     Birth control/protection: Condom Male     Comment: sporadic   Other Topics Concern    Not on file   Social History Narrative    Not on file     Social Determinants of Health     Financial Resource Strain: Not on file   Food Insecurity: Not on file   Transportation Needs: Not on file   Physical Activity: Not on file   Stress: Not on file   Social Connections: Not on file   Intimate Partner Violence: Not on file   Housing Stability: Not on file         I have reviewed the past medical, surgical, social and family history, medications and allergies as documented in the EMR. Review of systems: ROS is negative other than that noted in the HPI. Constitutional: Negative for fatigue and fever.       Physical Exam:    Blood pressure 120/72, pulse 71, height 5' 10.25" (1.784 m), weight 73.7 kg (162 lb 6.4 oz), not currently breastfeeding. General/Constitutional: NAD, well developed, well nourished  HENT: Normocephalic, atraumatic  CV: Intact distal pulses, regular rate  Resp: No respiratory distress or labored breathing  GI: Soft and non-tender   Lymphatic: No lymphadenopathy palpated  Neuro: Alert and Oriented x 3, no focal deficits  Psych: Normal mood, normal affect, normal judgement, normal behavior  Skin: Warm, dry, no rashes, no erythema      Knee Exam (focused): RIGHT LEFT   ROM:   0-130 0-130   Palpation: Effusion negative negative     MJL tenderness Negative Negative     LJL tenderness Negative Negative   Meniscus: Vee Negative Negative    Apley's Compression Negative Negative   Instability: Varus stable stable     Valgus stable stable   Special Tests: Lachman Negative Negative     Posterior drawer Negative Negative     Anterior drawer Negative Negative     Pivot shift not tested not tested     Dial not tested not tested   Patella: Palpation medial facet ttp, lateral facet ttp, and inferior pole ttp no tenderness     Mobility dislocatable 3/4     Apprehension Positive Positive   Other: Single leg 1/4 squat not tested not tested           LE NV Exam: +2 DP/PT pulses bilaterally  Sensation intact to light touch L2-S1 bilaterally    No calf tenderness to palpation bilaterally      Knee Imaging    MRI of the right knee was reviewed and demonstrates full-thickness cartilage defect of the patella measuring about 2 cm x 2cm with lateral tilt of the patella. I have reviewed the radiologist report agree with their impression.

## 2023-11-06 NOTE — PATIENT INSTRUCTIONS
PT protocol for NGA Patella    ROM:   Week 0-1     0-20 degrees active and passive,   Week 2-3     Active 30-60 degrees,   Week 4-6     Active  degrees,   Week 7-12   Progress to active full motion     Weight bearing:   Week 0-1     <20%,   Week 2-3     <20- 30%,   Week 4-6     <40-60%,   Week 7-12   Progress from 80% to full (week 8-10)    Brace:  Week 0-1 Locked in full extension for ambulation, unlock for motion  Week 2-3 Locked in full extension for ambulation, unlock for motion  Week 4-6 Locked in full extension for ambulation, unlock for motion  > Week 7 Unlocked at all times including ambulation  Week 8-9 DC Brace

## 2023-11-08 LAB
HIV1 RNA # SERPL NAA+PROBE: <20 COPIES/ML
HIV1 RNA SERPL NAA+PROBE-LOG#: NORMAL LOG10COPY/ML

## 2023-11-14 ENCOUNTER — OFFICE VISIT (OUTPATIENT)
Dept: PLASTIC SURGERY | Facility: CLINIC | Age: 33
End: 2023-11-14
Payer: COMMERCIAL

## 2023-11-14 VITALS
HEART RATE: 77 BPM | DIASTOLIC BLOOD PRESSURE: 75 MMHG | TEMPERATURE: 98.3 F | HEIGHT: 71 IN | WEIGHT: 165 LBS | BODY MASS INDEX: 23.1 KG/M2 | SYSTOLIC BLOOD PRESSURE: 104 MMHG

## 2023-11-14 DIAGNOSIS — L98.9 SKIN LESION: Primary | ICD-10-CM

## 2023-11-14 PROCEDURE — 99204 OFFICE O/P NEW MOD 45 MIN: CPT | Performed by: PHYSICIAN ASSISTANT

## 2023-11-14 NOTE — PROGRESS NOTES
Assessment/Plan:    Patient is a 79-year-old female who is referred to our office due to a spot of concern on her left neck. Please see HPI. Area was cleaned, prepped and anesthetized and a shave biopsy was performed. We will call patient with results. No problem-specific Assessment & Plan notes found for this encounter. Diagnoses and all orders for this visit:    Skin lesion          Subjective:      Patient ID: Lis Wetzel is a 34 y.o. female. HPI    Patient reports that she has had a left neck lesion for the past several years, it is changed in appearance and is darker. She requests biopsy today. It is nontender, has not changed in size. Upon evaluation, the patient has an approximately 0.3 x 0.3 round, raised, pigmented skin lesion of the left neck. The following portions of the patient's history were reviewed and updated as appropriate: She  has a past medical history of Abnormal Pap smear of cervix, HPV (human papilloma virus) infection, Miscarriage, Osteoarthritis, and Varicella. She   Patient Active Problem List    Diagnosis Date Noted    Skin lesion 2023    Cardiac murmur 2022    Lower extremity edema 2022    Spontaneous  in first trimester 2022    Right ovarian cyst 2022    Gastroesophageal reflux disease without esophagitis 2021    ASCUS with positive high risk HPV cervical 2017     She  has a past surgical history that includes Knee surgery. Her family history includes Alzheimer's disease in her paternal grandfather; Breast cancer in her cousin; Heart attack in her maternal grandfather, maternal grandmother, and paternal grandfather; Heart disease in her maternal grandfather; Hyperlipidemia in her father; Hypertension in her mother; Miscarriages / Ozella Knuckles in her mother; Multiple sclerosis in her mother; Thyroid disease in her mother. She  reports that she has never smoked.  She has never used smokeless tobacco. She reports current alcohol use. She reports that she does not use drugs. Current Outpatient Medications   Medication Sig Dispense Refill    Ascorbic Acid (VITAMIN C PO) Take by mouth      hydrochlorothiazide (HYDRODIURIL) 25 mg tablet TAKE 1 TABLET (25 MG TOTAL) BY MOUTH DAILY. 30 tablet 5    Multiple Vitamin (MULTI-VITAMIN PO) Take by mouth      ondansetron (ZOFRAN-ODT) 4 mg disintegrating tablet DISSOLVE 1 TABLET BY MOUTH EVERY 8 HOURS AS NEEDED FOR NAUSEA/ VOMITING       No current facility-administered medications for this visit. Current Outpatient Medications on File Prior to Visit   Medication Sig    Ascorbic Acid (VITAMIN C PO) Take by mouth    hydrochlorothiazide (HYDRODIURIL) 25 mg tablet TAKE 1 TABLET (25 MG TOTAL) BY MOUTH DAILY. Multiple Vitamin (MULTI-VITAMIN PO) Take by mouth    ondansetron (ZOFRAN-ODT) 4 mg disintegrating tablet DISSOLVE 1 TABLET BY MOUTH EVERY 8 HOURS AS NEEDED FOR NAUSEA/ VOMITING     No current facility-administered medications on file prior to visit. She has No Known Allergies. .    Review of Systems    A 12 point review of systems was completed and is negative except as per HPI    Objective:      /75   Pulse 77   Temp 98.3 °F (36.8 °C)   Ht 5' 11" (1.803 m)   Wt 74.8 kg (165 lb)   BMI 23.01 kg/m²          Physical Exam  Vitals and nursing note reviewed. Constitutional:       General: She is not in acute distress. Appearance: Normal appearance. She is normal weight. She is not ill-appearing. HENT:      Head: Normocephalic and atraumatic. Nose: Nose normal.      Mouth/Throat:      Mouth: Mucous membranes are moist.   Eyes:      Extraocular Movements: Extraocular movements intact. Conjunctiva/sclera: Conjunctivae normal.      Pupils: Pupils are equal, round, and reactive to light. Neck:      Vascular: No carotid bruit. Cardiovascular:      Rate and Rhythm: Normal rate and regular rhythm. Pulses: Normal pulses.       Heart sounds: Normal heart sounds. Pulmonary:      Effort: Pulmonary effort is normal. No respiratory distress. Breath sounds: Normal breath sounds. Abdominal:      General: Abdomen is flat. Palpations: Abdomen is soft. Musculoskeletal:         General: No swelling, tenderness, deformity or signs of injury. Normal range of motion. Cervical back: Normal range of motion and neck supple. No rigidity or tenderness. Right lower leg: No edema. Left lower leg: No edema. Lymphadenopathy:      Cervical: No cervical adenopathy. Skin:     General: Skin is warm and dry. Comments: See HPI   Neurological:      General: No focal deficit present. Mental Status: She is alert and oriented to person, place, and time. Cranial Nerves: No cranial nerve deficit. Sensory: No sensory deficit. Motor: No weakness.    Psychiatric:         Mood and Affect: Mood normal.         Behavior: Behavior normal.

## 2023-11-14 NOTE — PROGRESS NOTES
Biopsy    Date/Time: 11/14/2023 1:00 PM    Performed by: Prisca Cunningham PA-C  Authorized by: Prisca Cunningham PA-C  Universal Protocol:  Consent: Verbal consent obtained. Risks and benefits: risks, benefits and alternatives were discussed  Consent given by: patient  Patient understanding: patient states understanding of the procedure being performed  Patient consent: the patient's understanding of the procedure matches consent given  Procedure consent: procedure consent matches procedure scheduled  Required items: required blood products, implants, devices, and special equipment available  Patient identity confirmed: verbally with patient    Procedure Details - Lesion Biopsy:      Body area:  Head/neck (Left neck)    Biopsy method: shave biopsy      Biopsy tissue type: skin    Initial size (mm):  4    Final defect size (mm):  4    Malignancy: malignancy unknown

## 2023-11-21 ENCOUNTER — CONSULT (OUTPATIENT)
Dept: FAMILY MEDICINE CLINIC | Facility: CLINIC | Age: 33
End: 2023-11-21
Payer: COMMERCIAL

## 2023-11-21 VITALS
WEIGHT: 169.2 LBS | BODY MASS INDEX: 22.92 KG/M2 | HEIGHT: 72 IN | OXYGEN SATURATION: 99 % | DIASTOLIC BLOOD PRESSURE: 70 MMHG | HEART RATE: 84 BPM | TEMPERATURE: 96.9 F | SYSTOLIC BLOOD PRESSURE: 112 MMHG | RESPIRATION RATE: 14 BRPM

## 2023-11-21 DIAGNOSIS — Z01.818 PREOPERATIVE CLEARANCE: Primary | ICD-10-CM

## 2023-11-21 PROCEDURE — 99214 OFFICE O/P EST MOD 30 MIN: CPT | Performed by: STUDENT IN AN ORGANIZED HEALTH CARE EDUCATION/TRAINING PROGRAM

## 2023-11-21 NOTE — PROGRESS NOTES
[facility] PRE-OPERATIVE EVALUATION  [facility] PHYSICIAN GROUP - [facility]    NAME: Seamus Vallejo  AGE: 34 y.o. SEX: female  : 1990     DATE: 2023    [facility] Pre-Operative Evaluation      Chief Complaint: Pre-operative Evaluation     Surgery: Right knee surgical arthroscopy  Anticipated Date of Surgery: 2023  Referring Provider: No ref. provider found       History of Present Illness:     Seamus Vallejo is a 34 y.o. female who presents to the office today for a preoperative consultation at the request of surgeon, Dr. Elsa Hartman, who plans on performing right knee arthroscopy on 23. Planned anesthesia is general. Patient has a bleeding risk of: no recent abnormal bleeding. Patient does not have objections to receiving blood products if needed. Current anti-platelet/anti-coagulation medications that the patient is prescribed includes:  none . Assessment of Chronic Conditions:   - Lower extremity edema, managed on HCTZ 25 mg QD     Assessment of Cardiac Risk:  Denies unstable or severe angina or MI in the last 6 weeks or history of stent placement in the last year   Denies decompensated heart failure (e.g. New onset heart failure, NYHA functional class IV heart failure, or worsening existing heart failure)  Denies significant arrhythmias such as high grade AV block, symptomatic ventricular arrhythmia, newly recognized ventricular tachycardia, supraventricular tachycardia with resting heart rate >100, or symptomatic bradycardia  Denies severe heart valve disease including aortic stenosis or symptomatic mitral stenosis     Exercise Capacity:  Able to walk 4 blocks without symptoms?: Yes  Able to walk 2 flights without symptoms?: Yes    Prior Anesthesia Reactions: No     Personal history of venous thromboembolic disease? No    History of steroid use for >2 weeks within last year?  No         Review of Systems:     Review of Systems   Constitutional:  Negative for chills and fever. HENT:  Negative for congestion, ear pain, rhinorrhea and sinus pain. Eyes:  Negative for visual disturbance. Respiratory:  Negative for chest tightness, shortness of breath and wheezing. Cardiovascular:  Negative for chest pain and palpitations. Gastrointestinal:  Negative for abdominal pain, constipation, diarrhea and vomiting. Endocrine: Negative for polyuria. Genitourinary:  Negative for dysuria. Musculoskeletal:  Negative for myalgias. Neurological:  Negative for dizziness, syncope and light-headedness. Current Problem List:     Patient Active Problem List   Diagnosis    ASCUS with positive high risk HPV cervical    Gastroesophageal reflux disease without esophagitis    Spontaneous  in first trimester    Right ovarian cyst    Cardiac murmur    Lower extremity edema    Skin lesion       Allergies:     No Known Allergies    Current Medications:       Current Outpatient Medications:     Ascorbic Acid (VITAMIN C PO), Take by mouth, Disp: , Rfl:     hydrochlorothiazide (HYDRODIURIL) 25 mg tablet, TAKE 1 TABLET (25 MG TOTAL) BY MOUTH DAILY. , Disp: 30 tablet, Rfl: 5    Multiple Vitamin (MULTI-VITAMIN PO), Take by mouth, Disp: , Rfl:     ondansetron (ZOFRAN-ODT) 4 mg disintegrating tablet, DISSOLVE 1 TABLET BY MOUTH EVERY 8 HOURS AS NEEDED FOR NAUSEA/ VOMITING, Disp: , Rfl:     Past Medical History:       Past Medical History:   Diagnosis Date    Abnormal Pap smear of cervix     Arthritis     Osteoarthritis    Asthma     HPV (human papilloma virus) infection     Miscarriage     Osteoarthritis     Varicella         Past Surgical History:   Procedure Laterality Date    KNEE SURGERY          Family History   Problem Relation Age of Onset    Multiple sclerosis Mother     Thyroid disease Mother     Hypertension Mother     Miscarriages / [place] Mother     Hyperlipidemia Father     Breast cancer Cousin     Heart attack Maternal Grandfather     Heart disease Maternal Grandfather     Heart attack Paternal Grandfather     Alzheimer's disease Paternal Grandfather     Diabetes Paternal Grandfather     Heart attack Maternal Grandmother     Bipolar disorder Cousin         Social History     Socioeconomic History    Marital status: Single     Spouse name: Not on file    Number of children: Not on file    Years of education: Not on file    Highest education level: Not on file   Occupational History    Not on file   Tobacco Use    Smoking status: Never    Smokeless tobacco: Never   Vaping Use    Vaping Use: Never used   Substance and Sexual Activity    Alcohol use: Yes     Comment: Socially    Drug use: Never    Sexual activity: Yes     Partners: Male     Birth control/protection: Condom Male     Comment: sporadic   Other Topics Concern    Not on file   Social History Narrative    Not on file     Social Determinants of Health     Financial Resource Strain: Not on file   Food Insecurity: Not on file   Transportation Needs: Not on file   Physical Activity: Not on file   Stress: Not on file   Social Connections: Not on file   Intimate Partner Violence: Not on file   Housing Stability: Not on file        Physical Exam:     /70 (BP Location: Left arm, Patient Position: Sitting, Cuff Size: Standard)   Pulse 84   Temp (!) 96.9 °F (36.1 °C) (Tympanic)   Resp 14   Ht 5' 11.89" (1.826 m)   Wt 76.7 kg (169 lb 3.2 oz)   SpO2 99%   BMI 23.02 kg/m²     Physical Exam  Constitutional:       Appearance: Normal appearance. HENT:      Head: Normocephalic and atraumatic. Right Ear: External ear normal.      Left Ear: External ear normal.      Nose: Nose normal.   Eyes:      Conjunctiva/sclera: Conjunctivae normal.   Cardiovascular:      Rate and Rhythm: Normal rate. Heart sounds: Normal heart sounds. Pulmonary:      Effort: Pulmonary effort is normal.      Breath sounds: Normal breath sounds. Musculoskeletal:         General: Normal range of motion.       Cervical back: Normal range of motion. Right lower leg: No edema. Left lower leg: No edema. Skin:     General: Skin is warm and dry. Neurological:      Mental Status: She is alert and oriented to person, place, and time. Psychiatric:         Behavior: Behavior normal.          Data:     Pre-operative work-up    Laboratory Results: I have personally reviewed the pertinent laboratory results/reports      EKG: I have personally reviewed pertinent reports. Chest x-ray:  N/A      Previous cardiopulmonary studies within the past year: none        Assessment & Recommendations:     No diagnosis found. Pre-Op Evaluation Assessment  34 y.o. female with planned surgery: 12/14/23. Known risk factors for perioperative complications: None. Current medications which may produce withdrawal symptoms if withheld perioperatively: none. Pre-Op Evaluation Plan  1. Further preoperative workup as follows:   - None; no further preoperative work-up is required    2. Medication Management/Recommendations:   - None, continue medication regimen including morning of surgery, with sip of water    3. Prophylaxis for cardiac events with perioperative beta-blockers: not indicated. 4. Patient requires further consultation with: None    Clearance  Patient is CLEARED for surgery without any additional cardiac testing. Patient is low risk for a moderate risk procedure. Patient is medically stable for planned procedure as detailed above.      Weston Delgadillo MD  [address] [place] [address] Road [address]  Phone#  [phone]  Fax#  [phone]

## 2023-11-22 ENCOUNTER — TELEPHONE (OUTPATIENT)
Dept: PLASTIC SURGERY | Facility: CLINIC | Age: 33
End: 2023-11-22

## 2023-11-22 NOTE — TELEPHONE ENCOUNTER
Called patient with biopsy results which were as follows:    Final Diagnosis   This case was sent for processing and evaluation to [address] [place] [address]. The final diagnosis is issued below; their complete report is viewable in the attached link. A. Neck, shave biopsy:  - Compound Melanocyte Nevus. Patient will follow-up as needed.

## 2023-12-06 NOTE — PRE-PROCEDURE INSTRUCTIONS
Pre-Surgery Instructions:   Medication Instructions    Ascorbic Acid (VITAMIN C PO) Stop taking 7 days prior to surgery. hydrochlorothiazide (HYDRODIURIL) 25 mg tablet Hold day of surgery. Multiple Vitamin (MULTI-VITAMIN PO) Stop taking 7 days prior to surgery. ondansetron (ZOFRAN-ODT) 4 mg disintegrating tablet Uses PRN- OK to take day of surgery   Medication instructions for day surgery reviewed. Please use only a sip of water to take your instructed medications. Avoid all over the counter vitamins, supplements and NSAIDS for one week prior to surgery per anesthesia guidelines. Tylenol is ok to take as needed. You will receive a call one business day prior to surgery with an arrival time and hospital directions. If your surgery is scheduled on a Monday, the hospital will be calling you on the Friday prior to your surgery. If you have not heard from anyone by 8pm, please call the hospital supervisor through the hospital  at [phone]. Ani Damon [phone]). Do not eat or drink anything after midnight the night before your surgery, including candy, mints, lifesavers, or chewing gum. Do not drink alcohol 24hrs before your surgery. Try not to smoke at least 24hrs before your surgery. Follow the pre surgery showering instructions as listed in the [facility] Surgical Experience Booklet” or otherwise provided by your surgeon's office. Do not use a blade to shave the surgical area 1 week before surgery. It is okay to use a clean electric clippers up to 24 hours before surgery. Do not apply any lotions, creams, including makeup, cologne, deodorant, or perfumes after showering on the day of your surgery. Do not use dry shampoo, hair spray, hair gel, or any type of hair products. No contact lenses, eye make-up, or artificial eyelashes. Remove nail polish, including gel polish, and any artificial, gel, or acrylic nails if possible. Remove all jewelry including rings and body piercing jewelry. Wear causal clothing that is easy to take on and off. Consider your type of surgery. Keep any valuables, jewelry, piercings at home. Please bring any specially ordered equipment (sling, braces) if indicated. Arrange for a responsible person to drive you to and from the hospital on the day of your surgery. Visitor Guidelines discussed. Call the surgeon's office with any new illnesses, exposures, or additional questions prior to surgery. Please reference your [facility] Surgical Experience Booklet” for additional information to prepare for your upcoming surgery.

## 2023-12-14 ENCOUNTER — ANESTHESIA EVENT (OUTPATIENT)
Dept: PERIOP | Facility: HOSPITAL | Age: 33
End: 2023-12-14
Payer: COMMERCIAL

## 2023-12-14 ENCOUNTER — HOSPITAL ENCOUNTER (OUTPATIENT)
Facility: HOSPITAL | Age: 33
Setting detail: OUTPATIENT SURGERY
Discharge: HOME | End: 2023-12-14
Attending: ORTHOPAEDIC SURGERY | Admitting: ORTHOPAEDIC SURGERY
Payer: COMMERCIAL

## 2023-12-14 ENCOUNTER — ANESTHESIA (OUTPATIENT)
Dept: PERIOP | Facility: HOSPITAL | Age: 33
End: 2023-12-14
Payer: COMMERCIAL

## 2023-12-14 VITALS
WEIGHT: 169 LBS | OXYGEN SATURATION: 99 % | SYSTOLIC BLOOD PRESSURE: 106 MMHG | TEMPERATURE: 97.6 F | HEIGHT: 71 IN | BODY MASS INDEX: 23.66 KG/M2 | RESPIRATION RATE: 18 BRPM | DIASTOLIC BLOOD PRESSURE: 53 MMHG | HEART RATE: 56 BPM

## 2023-12-14 DIAGNOSIS — Z98.890 S/P ARTHROSCOPIC SURGERY OF RIGHT KNEE: Primary | ICD-10-CM

## 2023-12-14 LAB
EXT PREGNANCY TEST URINE: NEGATIVE
EXT. CONTROL: NORMAL

## 2023-12-14 PROCEDURE — 29877 ARTHRS KNEE SURG DBRDMT/SHVG: CPT | Performed by: PHYSICIAN ASSISTANT

## 2023-12-14 PROCEDURE — 81025 URINE PREGNANCY TEST: CPT | Performed by: ORTHOPAEDIC SURGERY

## 2023-12-14 PROCEDURE — 29877 ARTHRS KNEE SURG DBRDMT/SHVG: CPT | Performed by: ORTHOPAEDIC SURGERY

## 2023-12-14 PROCEDURE — NC001 PR NO CHARGE: Performed by: ORTHOPAEDIC SURGERY

## 2023-12-14 RX ORDER — PROPOFOL 10 MG/ML
INJECTION, EMULSION INTRAVENOUS AS NEEDED
Status: DISCONTINUED | OUTPATIENT
Start: 2023-12-14 | End: 2023-12-14

## 2023-12-14 RX ORDER — ONDANSETRON 2 MG/ML
INJECTION INTRAMUSCULAR; INTRAVENOUS AS NEEDED
Status: DISCONTINUED | OUTPATIENT
Start: 2023-12-14 | End: 2023-12-14

## 2023-12-14 RX ORDER — ONDANSETRON 2 MG/ML
4 INJECTION INTRAMUSCULAR; INTRAVENOUS ONCE AS NEEDED
Status: DISCONTINUED | OUTPATIENT
Start: 2023-12-14 | End: 2023-12-14 | Stop reason: HOSPADM

## 2023-12-14 RX ORDER — SODIUM CHLORIDE, SODIUM LACTATE, POTASSIUM CHLORIDE, CALCIUM CHLORIDE 600; 310; 30; 20 MG/100ML; MG/100ML; MG/100ML; MG/100ML
INJECTION, SOLUTION INTRAVENOUS CONTINUOUS PRN
Status: DISCONTINUED | OUTPATIENT
Start: 2023-12-14 | End: 2023-12-14

## 2023-12-14 RX ORDER — ACETAMINOPHEN 325 MG/1
650 TABLET ORAL EVERY 6 HOURS PRN
Status: DISCONTINUED | OUTPATIENT
Start: 2023-12-14 | End: 2023-12-14 | Stop reason: HOSPADM

## 2023-12-14 RX ORDER — LIDOCAINE HYDROCHLORIDE 10 MG/ML
INJECTION, SOLUTION EPIDURAL; INFILTRATION; INTRACAUDAL; PERINEURAL AS NEEDED
Status: DISCONTINUED | OUTPATIENT
Start: 2023-12-14 | End: 2023-12-14

## 2023-12-14 RX ORDER — CEFAZOLIN SODIUM 2 G/50ML
2000 SOLUTION INTRAVENOUS ONCE
Status: COMPLETED | OUTPATIENT
Start: 2023-12-14 | End: 2023-12-14

## 2023-12-14 RX ORDER — MIDAZOLAM HYDROCHLORIDE 2 MG/2ML
INJECTION, SOLUTION INTRAMUSCULAR; INTRAVENOUS AS NEEDED
Status: DISCONTINUED | OUTPATIENT
Start: 2023-12-14 | End: 2023-12-14

## 2023-12-14 RX ORDER — TRAMADOL HYDROCHLORIDE 50 MG/1
50 TABLET ORAL EVERY 6 HOURS PRN
Status: DISCONTINUED | OUTPATIENT
Start: 2023-12-14 | End: 2023-12-14 | Stop reason: HOSPADM

## 2023-12-14 RX ORDER — MAGNESIUM HYDROXIDE 1200 MG/15ML
LIQUID ORAL AS NEEDED
Status: DISCONTINUED | OUTPATIENT
Start: 2023-12-14 | End: 2023-12-14 | Stop reason: HOSPADM

## 2023-12-14 RX ORDER — OXYCODONE HYDROCHLORIDE 5 MG/1
5 TABLET ORAL EVERY 4 HOURS PRN
Qty: 15 TABLET | Refills: 0 | Status: SHIPPED | OUTPATIENT
Start: 2023-12-14

## 2023-12-14 RX ORDER — PROPOFOL 10 MG/ML
INJECTION, EMULSION INTRAVENOUS CONTINUOUS PRN
Status: DISCONTINUED | OUTPATIENT
Start: 2023-12-14 | End: 2023-12-14

## 2023-12-14 RX ORDER — FENTANYL CITRATE 50 UG/ML
INJECTION, SOLUTION INTRAMUSCULAR; INTRAVENOUS AS NEEDED
Status: DISCONTINUED | OUTPATIENT
Start: 2023-12-14 | End: 2023-12-14

## 2023-12-14 RX ORDER — SODIUM CHLORIDE, SODIUM LACTATE, POTASSIUM CHLORIDE, CALCIUM CHLORIDE 600; 310; 30; 20 MG/100ML; MG/100ML; MG/100ML; MG/100ML
125 INJECTION, SOLUTION INTRAVENOUS CONTINUOUS
Status: DISCONTINUED | OUTPATIENT
Start: 2023-12-14 | End: 2023-12-14 | Stop reason: HOSPADM

## 2023-12-14 RX ORDER — FENTANYL CITRATE/PF 50 MCG/ML
50 SYRINGE (ML) INJECTION
Status: DISCONTINUED | OUTPATIENT
Start: 2023-12-14 | End: 2023-12-14 | Stop reason: HOSPADM

## 2023-12-14 RX ORDER — KETOROLAC TROMETHAMINE 30 MG/ML
INJECTION, SOLUTION INTRAMUSCULAR; INTRAVENOUS AS NEEDED
Status: DISCONTINUED | OUTPATIENT
Start: 2023-12-14 | End: 2023-12-14

## 2023-12-14 RX ORDER — DEXAMETHASONE SODIUM PHOSPHATE 10 MG/ML
INJECTION, SOLUTION INTRAMUSCULAR; INTRAVENOUS AS NEEDED
Status: DISCONTINUED | OUTPATIENT
Start: 2023-12-14 | End: 2023-12-14

## 2023-12-14 RX ORDER — HYDROMORPHONE HCL/PF 1 MG/ML
0.5 SYRINGE (ML) INJECTION
Status: DISCONTINUED | OUTPATIENT
Start: 2023-12-14 | End: 2023-12-14 | Stop reason: HOSPADM

## 2023-12-14 RX ADMIN — SODIUM CHLORIDE, SODIUM LACTATE, POTASSIUM CHLORIDE, AND CALCIUM CHLORIDE: .6; .31; .03; .02 INJECTION, SOLUTION INTRAVENOUS at 10:44

## 2023-12-14 RX ADMIN — MIDAZOLAM 2 MG: 1 INJECTION INTRAMUSCULAR; INTRAVENOUS at 11:03

## 2023-12-14 RX ADMIN — FENTANYL CITRATE 50 MCG: 50 INJECTION, SOLUTION INTRAMUSCULAR; INTRAVENOUS at 11:09

## 2023-12-14 RX ADMIN — DEXAMETHASONE SODIUM PHOSPHATE 10 MG: 10 INJECTION, SOLUTION INTRAMUSCULAR; INTRAVENOUS at 11:10

## 2023-12-14 RX ADMIN — KETOROLAC TROMETHAMINE 15 MG: 30 INJECTION INTRAMUSCULAR; INTRAVENOUS at 11:51

## 2023-12-14 RX ADMIN — FENTANYL CITRATE 50 MCG: 50 INJECTION, SOLUTION INTRAMUSCULAR; INTRAVENOUS at 11:28

## 2023-12-14 RX ADMIN — ONDANSETRON 4 MG: 2 INJECTION INTRAMUSCULAR; INTRAVENOUS at 11:03

## 2023-12-14 RX ADMIN — PROPOFOL 200 MG: 10 INJECTION, EMULSION INTRAVENOUS at 11:08

## 2023-12-14 RX ADMIN — PROPOFOL 160 MCG/KG/MIN: 10 INJECTION, EMULSION INTRAVENOUS at 11:09

## 2023-12-14 RX ADMIN — LIDOCAINE HYDROCHLORIDE 50 MG: 10 INJECTION, SOLUTION EPIDURAL; INFILTRATION; INTRACAUDAL at 11:08

## 2023-12-14 RX ADMIN — CEFAZOLIN SODIUM 2000 MG: 2 SOLUTION INTRAVENOUS at 11:03

## 2023-12-14 NOTE — OP NOTE
OPERATIVE REPORT  PATIENT NAME: Lis Wetzel    :  1990  MRN: [id number]  Pt Location:  OR ROOM 11    SURGERY DATE: 2023    Surgeons and Role:     * Nerissa Redd DO - Primary     * Ruth Hastings - Assisting    Preop Diagnosis:  Recurrent dislocation of right patella [M22.01]  Patellar instability of right knee [M25.361]  Tear of articular cartilage of knee as current injury, right, initial encounter [S83.31XA]    Post-Op Diagnosis Codes:     * Recurrent dislocation of right patella [M22.01]     * Patellar instability of right knee [M25.361]     * Tear of articular cartilage of knee as current injury, right, initial encounter [S83.31XA]    Procedure(s):  Right - ARTHROSCOPY KNEE- chondroplasty with cartilage biopsy    Specimen(s):  * No specimens in log *    Estimated Blood Loss:   Minimal    Drains:  * No LDAs found *    Anesthesia Type:   General    Operative Indications:  Recurrent dislocation of right patella [M22.01]  Patellar instability of right knee [M25.361]  Tear of articular cartilage of knee as current injury, right, initial encounter [B93.53QC]      Complications:   None    Procedure and Technique:    1. Surgical arthroscopy of the right knee with articular cartilage biopsy    Anesthesia:  General     Tourniquet Time:  30 min     Blood Loss:  Minimal       Findings: There was a region of Grade 4 chondral changes about the patella measuring 3 x 3 cm and was not contained. .   . Arthroscopic evaluation of the knee revealed the following:   Medial meniscus: Intact. Medial femoral condyle: No notable chondral defects. Medial tibial plateau: No notable chondral defects. Anterior cruciate ligament: Intact. Posterior cruciate ligament: Normal appearance. Lateral meniscus: Intact. Lateral femoral condyle: No notable chondral defects. Lateral tibial plateau: No notable chondral defects. Medial and lateral gutters: No additional loose bodies.    Patella: chondral defect noted above. Trochlea: No notable chondral defects. Medial plica: Minimal hypertrophy with some synovial hyperemia. Procedure: In the pre-operative holding area, the patient identified the correct operative extremity and I marked that extremity with my initials, using a permanent marker. The patient was then brought to the operating room and positioned supine. Following satisfactory induction of anesthesia, the right knee was prepped and draped in the usual sterile fashion for surgical arthroscopy of the knee. Before any surgical instrumentation was passed to me by the surgical technician, a formalized time-out occurred, which involves the surgeon, circulating nurse, and anesthesia staff all verifying the correct operative extremity. My initials were visible on the prepped and draped operative field. The anatomic landmarks of the anteromedial and anterolateral portals were marked and  the anterolateral portal was established with a scalpel. The arthroscope was introduced through this portal. Under direct visualization, the anteromedial portal was established with a localizing needle followed by a scalpel. A probe was then introduced into the anteromedial portal. A systematic diagnostic arthroscopy evaluated the following:  medial compartment, notch, lateral compartment, patellofemoral compartment, medial gutter, and lateral gutter. As detailed above, articular cartilage defects were noted in the patellofemoral compartment. All unstable chondral fragments associated with these defects were resected and the surfaces of these defects were smoothed, utilizing the motorized shaver     The chondral defect of the patella measured 3 cm In anterior-posterior distance and 3 cm the medial to lateral distance. Due to the size of this defect, it decision was then made to proceed forward with cartilage biopsy now for a delayed ACI surgery at a future date.     A biopsy of the lateral edge of the lateral femoral condyle along the edge of the articular margin was taken with a sharp curved elevator. Care was taken to not damage the surrounding cartilage. Once the biopsy was taken the rest of the defect at the biopsy site was then smoothed with a motorized shaver. The biopsy was packaged appropriately and sterilize kit and sent to the lab for processing. There was no additional pathology. All particulate debris was removed. The knee was copiously rinsed and then drained. The portals were closed with an interrupted 4-0 Vicryl absorbable suture. The skin was cleansed with sterile saline and dried before Steri-Strips were applied. Finally, a sterile dressing was secured by Webril and an Ace wrap. I was present for the entire procedure., A qualified resident physician was not available. , and A physician assistant was required during the procedure for retraction, tissue handling, dissection and suturing.     Patient Disposition:  PACU         SIGNATURE: Rose Alaniz DO  DATE: December 14, 2023  TIME: 11:52 AM

## 2023-12-14 NOTE — NURSING NOTE
Pt able to eat/drink and pain is controlled. Pt in no apparent distress and iv removed. AVS instructions given and pt verbalized understanding.

## 2023-12-14 NOTE — ANESTHESIA PREPROCEDURE EVALUATION
Procedure:  ARTHROSCOPY KNEE- chondroplasty with possible cartilage biopsy (Right: Knee)    Relevant Problems   CARDIO   (+) Cardiac murmur      GI/HEPATIC   (+) Gastroesophageal reflux disease without esophagitis      Endocrine   (+) Right ovarian cyst      Musculoskeletal and Integument   (+) Skin lesion      Other   (+) ASCUS with positive high risk HPV cervical   (+) Lower extremity edema   (+) Spontaneous  in first trimester        Physical Exam    Airway    Mallampati score: II  TM Distance: >3 FB  Neck ROM: full     Dental   No notable dental hx     Cardiovascular  Cardiovascular exam normal    Pulmonary  Pulmonary exam normal     Other Findings  post-pubertal.      Anesthesia Plan  ASA Score- 2     Anesthesia Type- general with ASA Monitors. Additional Monitors:     Airway Plan: LMA. Plan Factors-Exercise tolerance (METS): >4 METS. Chart reviewed. Existing labs reviewed. Patient is not a current smoker. Patient not instructed to abstain from smoking on day of procedure. Patient did not smoke on day of surgery. Induction- intravenous. Postoperative Plan-     Informed Consent- Anesthetic plan and risks discussed with patient. I personally reviewed this patient with the CRNA. Discussed and agreed on the Anesthesia Plan with the CRNA. Paulo Jc

## 2023-12-14 NOTE — ANESTHESIA POSTPROCEDURE EVALUATION
Post-Op Assessment Note    CV Status:  Stable  Pain Score: 0    Pain management: adequate       Mental Status:  Sleepy and arousable   PONV Controlled:  None   Airway Patency:  Patent     Post Op Vitals Reviewed: Yes      Staff: CRNA             BP      Temp      Pulse     Resp      SpO2

## 2023-12-14 NOTE — DISCHARGE INSTR - AVS FIRST PAGE
POSTOPERATIVE INSTRUCTIONS following KNEE SURGERY    MEDICATIONS:  Resume all home medications unless otherwise instructed by your surgeon. Pain Medication:  Oxycodone 5 mg, 1 tablet every 4 hours as needed  If you were given a regional anesthetic (nerve block), please begin taking the pain medication as soon as you get home, even if you have minimal or no pain. DO NOT WAIT FOR THE NERVE BLOCK TO WEAR OFF. Possible side effects include nausea, constipation, and urinary retention. If you experience these side effects, please call our office for assistance. Pain med refills are authorized only during office hours (8am-4pm, Mon-Fri). Anti-Inflammatory:  Ibuprofen 600 mg, 1 tablet every 8 hours for 4 weeks and Tylenol 325 mg, 1-2 tablets every 6 hours for 4 weeks  Take with food. Stop if you experience nausea, reflux, or stomach pain. Nausea Medication:  None  Fill prescription ONLY if you expericnce severe nausea. Blood Clot Prevention:  Aspirin 81 mg, 1 tablet twice daily for 3 weeks  Pump your foot up and down 20 times per hour while you are less mobile. WOUND CARE:  Keep the dressing clean and dry. Light drainage may occur the first 2 days postop. You may remove the dressings and get the incision wet in the shower 72 hours after surgery. DO NOT remove steri-strips or sutures. DO NOT immerse the incision under water. Carefully pat the incision dry. If there is wound drainage, re-apply a fresh dry gauze dressing. Please call our office ([phone]) if you experience either of the following:  Sudden increase in swelling, redness, or warmth at the surgical site  Excessive incisional drainage that persists beyond the 3rd day after surgery  Oral temperature greater than 101 degrees, not relieved with Tylenol  Shortness of breath, chest pain, nausea, or any other concerning symptoms    SWELLING CONTROL:  Cold Therapy:   The cold therapy device may be used either continuously or only as needed, according to your preference. Do not let the pad directly touch your skin. Alternatively, apply ice (20 min on, 20 min off) as often as you feel is necessary. Elevation:  Elevate the entire leg above heart level. Place pillows under your ankle to keep your knee straight. Compression:  Apply ACE wraps or a thigh-length compression stocking as needed. RANGE OF MOTION:  You are allowed FULL RANGE OF MOTION as tolerated. IMMOBILIZATION:  None. You are allowed full range of motion as tolerated. ACTIVITY:   BEAR FULL WEIGHT AS TOLERATED on the operative leg. Use crutches to assist only as needed. Using Crutches on Stairs:  Going up, lead with your "good" (nonoperative) leg. Going down, lead with your "bad" (operative) leg. Use a hand rail when available. Knee Extension:  Place a rolled towel or pillow under your ankle for 20-30 minutes 3-5 times per day. This will help to maintain full knee extension. Quad Sets:  Sit or lie with your knee straight. Tighten your quadriceps (front thigh) muscle. Hold for 3 seconds, then relax. Repeat 20 times per hour while awake. PHYSICAL THERAPY:  Begin therapy 3 TO 5 DAYS AFTER SURGERY. You were given a prescription for therapy at your preoperative office visit. If you do not have physical therapy scheduled yet, please call our office for assistance. FOLLOW-UP APPOINTMENT:  7-10 days after surgery with:    Dr. Silvestre Johnson, D.OKristi   [facility]  [address] [place] [address] ROSELIA Arzola, [facility]  [phone] ([address] [place] [address])  [phone] (After-Hours)

## 2023-12-14 NOTE — H&P
Ortho Sports Medicine Knee Follow Up Visit     Assesment:     34 y.o. 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 Post-op PT written  Work note written     Will hold on crutches, patient believes she has a pair at home    Imaging: All imaging from today was reviewed by myself and explained to the patient. Injection:    No Injection planned at this time. Surgery:     Had lengthy discussion with patient that the surgical plan is to be completed in 2 surgeries. First being a diagnostic arthroscopy to access if the patient is a candidate to undergo ACI of the patella. If she is a candidate then a cartilage biopsy will be performed for future NGA procedure. The second procedure will be in 6-8 weeks including open arthrotomy NGA procedure of the patella and open MPFL reconstruction with allograft. All of the risks and benefits of operative treatment were explained to the patient, as well as the risks and benefits of any alternative treatment options, including nonoperative care. The risks of surgical treatement include, but are not limited to, infection, bleeding, blood clot, neurovascular damage, need for further surgery, continued pain, cardiovascular risk, and anesthesia risk. The patient understood this and elects to proceed forward with surgical intervention. We will proceed forward with surgical arthroscopy of the knee with chondroplasty and possible cartilage biopsy    Patient Denies history of blood clot. Patient denies personal or family history of bleeding or clotting disorder. Patient does not take any blood thinners. Patient has cardiac history including  cardiac murmur, this is managed by her PCP. She did undergo recent ECHO on 8/25/22 . Patient denies  high blood pressure. Patient is on Hydrodiuril for lower leg edema. She underwent a venous duplex in November 2022  that demonstrated some deep reflux bilaterally and GSV reflux. Patient does not  see a cardiologist. Patient Denies  current history of diabetes. Patient denies allergies to antibiotics. Patient denies history of MRSA infection. Patient Denies current tobacco use. Discussed with patient use of narcotics for post-op pain. Patient denies history of opioid abuse. Medical clearance will be obtained. Bloodwork will be obtained. EKG will be obtained. Follow up:    No follow-ups on file. No chief complaint on file. History of Present Illness: The patient is returns for follow up of Right knee MRI. Since the prior visit, She reports no improvement. Patient continues to have patellar instability and pain anteriorly. Patient reports that the right kneecap has dislocated fully at least 4 times but states that she has frequent subluxations daily. Patient reports that she is unable to do leg extensions while at the gym due to patellar slipping. 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 Pain is located anterior. Pain is improved by rest.  Pain is aggravated by stairs, squatting, weight bearing, walking, and pivoting on a planted foot. Symptoms include swelling and dislocation of patella. The patient has tried rest, ice, NSAIDS, physical therapy, bracing, and prior surgical intervention of right knee lateral release and VMO tightening performed by Dr. Elaine Smith at [facility] in 2007. Knee Surgical History:  right knee lateral release and VMO tightening performed by Dr. Elaine Smith at [facility] in 2007.      Past Medical, Social and Family History:  Past Medical History:   Diagnosis Date    Abnormal Pap smear of cervix     Arthritis     Osteoarthritis    Asthma     as child    HPV (human papilloma virus) infection     Miscarriage Osteoarthritis     Varicella      Past Surgical History:   Procedure Laterality Date    KNEE SURGERY Right     MCL repair     No Known Allergies  No current facility-administered medications on file prior to encounter. Current Outpatient Medications on File Prior to Encounter   Medication Sig Dispense Refill    Ascorbic Acid (VITAMIN C PO) Take by mouth in the morning      hydrochlorothiazide (HYDRODIURIL) 25 mg tablet TAKE 1 TABLET (25 MG TOTAL) BY MOUTH DAILY. 30 tablet 5    Multiple Vitamin (MULTI-VITAMIN PO) Take by mouth in the morning      ondansetron (ZOFRAN-ODT) 4 mg disintegrating tablet DISSOLVE 1 TABLET BY MOUTH EVERY 8 HOURS AS NEEDED FOR NAUSEA/ VOMITING       Social History     Socioeconomic History    Marital status: Single     Spouse name: Not on file    Number of children: Not on file    Years of education: Not on file    Highest education level: Not on file   Occupational History    Not on file   Tobacco Use    Smoking status: Never    Smokeless tobacco: Never   Vaping Use    Vaping status: Never Used   Substance and Sexual Activity    Alcohol use: Yes     Alcohol/week: 3.0 standard drinks of alcohol     Types: 3 Standard drinks or equivalent per week     Comment: Socially    Drug use: Never    Sexual activity: Yes     Partners: Male     Birth control/protection: Condom Male     Comment: sporadic   Other Topics Concern    Not on file   Social History Narrative    Not on file     Social Determinants of Health     Financial Resource Strain: Not on file   Food Insecurity: Not on file   Transportation Needs: Not on file   Physical Activity: Not on file   Stress: Not on file   Social Connections: Not on file   Intimate Partner Violence: Not on file   Housing Stability: Not on file         I have reviewed the past medical, surgical, social and family history, medications and allergies as documented in the EMR. Review of systems: ROS is negative other than that noted in the HPI.   Constitutional: Negative for fatigue and fever. Physical Exam:    Blood pressure 116/69, pulse 88, temperature 97.7 °F (36.5 °C), temperature source Temporal, resp. rate 18, height 5' 11" (1.803 m), weight 76.7 kg (169 lb), last menstrual period 11/29/2023, SpO2 97%, not currently breastfeeding. General/Constitutional: NAD, well developed, well nourished  HENT: Normocephalic, atraumatic  CV: Intact distal pulses, regular rate  Resp: No respiratory distress or labored breathing  GI: Soft and non-tender   Lymphatic: No lymphadenopathy palpated  Neuro: Alert and Oriented x 3, no focal deficits  Psych: Normal mood, normal affect, normal judgement, normal behavior  Skin: Warm, dry, no rashes, no erythema      Knee Exam (focused): RIGHT LEFT   ROM:   0-130 0-130   Palpation: Effusion negative negative     MJL tenderness Negative Negative     LJL tenderness Negative Negative   Meniscus: Vee Negative Negative    Apley's Compression Negative Negative   Instability: Varus stable stable     Valgus stable stable   Special Tests: Lachman Negative Negative     Posterior drawer Negative Negative     Anterior drawer Negative Negative     Pivot shift not tested not tested     Dial not tested not tested   Patella: Palpation medial facet ttp, lateral facet ttp, and inferior pole ttp no tenderness     Mobility dislocatable 3/4     Apprehension Positive Positive   Other: Single leg 1/4 squat not tested not tested           LE NV Exam: +2 DP/PT pulses bilaterally  Sensation intact to light touch L2-S1 bilaterally    No calf tenderness to palpation bilaterally      Knee Imaging    MRI of the right knee was reviewed and demonstrates full-thickness cartilage defect of the patella measuring about 2 cm x 2cm with lateral tilt of the patella. I have reviewed the radiologist report agree with their impression.

## 2023-12-14 NOTE — ANESTHESIA POSTPROCEDURE EVALUATION
Post-Op Assessment Note    CV Status:  Stable  Pain Score: 0    Pain management: adequate       Mental Status:  Sleepy and arousable   PONV Controlled:  None   Airway Patency:  Patent     Post Op Vitals Reviewed: Yes      Staff: CRNA               BP   97/55   Temp   98   pulse 75   Resp   10   SpO2   99

## 2023-12-20 ENCOUNTER — OFFICE VISIT (OUTPATIENT)
Dept: OBGYN CLINIC | Facility: MEDICAL CENTER | Age: 33
End: 2023-12-20

## 2023-12-20 VITALS
HEIGHT: 71 IN | HEART RATE: 74 BPM | SYSTOLIC BLOOD PRESSURE: 114 MMHG | DIASTOLIC BLOOD PRESSURE: 73 MMHG | WEIGHT: 169 LBS | BODY MASS INDEX: 23.66 KG/M2

## 2023-12-20 DIAGNOSIS — M22.01 RECURRENT DISLOCATION OF RIGHT PATELLA: Primary | ICD-10-CM

## 2023-12-20 DIAGNOSIS — M25.361 PATELLAR INSTABILITY OF RIGHT KNEE: ICD-10-CM

## 2023-12-20 DIAGNOSIS — S83.31XA TEAR OF ARTICULAR CARTILAGE OF KNEE AS CURRENT INJURY, RIGHT, INITIAL ENCOUNTER: ICD-10-CM

## 2023-12-20 DIAGNOSIS — Z98.890 S/P ARTHROSCOPIC SURGERY OF RIGHT KNEE: ICD-10-CM

## 2023-12-20 PROCEDURE — 99024 POSTOP FOLLOW-UP VISIT: CPT | Performed by: PHYSICIAN ASSISTANT

## 2023-12-20 NOTE — PROGRESS NOTES
Knee Post Operative Visit     Assesment:     33 y.o. female 1 week s/p surgical arthroscopy of the right knee with chondroplasty of patella and cartilage biopsy, DOS: 12/14/23, with effusion and tear of articular cartilage of patella measuring 3x3cm, patellar instability     Plan:    Post-Operative treatment:    Ice to knee for 20 minutes at least 1-2 times daily.  PT per protocol, focus on ROM and quad strengthening   OTC NSAIDS prn for pain.    Steri-strips were changed in the office today. No incisional concerns. New steri-strips applied. Instructed that they may shower, allowing the warm soapy water to run over the incision and pat dry. If steri-strips fall off on their own, that is fine, if they are still in place in 1 week they are to remove the steri-strips. No soaking, baths, or tubs at this time. No creams ointments or lotions for an additional 3 weeks. Start scar massage.     Patient does have a moderate to large sized effusion about the right knee.  Instructed to utilize compression, ice, elevation, and ibuprofen for anti-inflammatory to help with the effusion.  Also instructed to continue knee range of motion and her exercises to help evacuate the effusion.  Instructed that her body will absorb this over time.  Instructed that at this time we will refrain from aspiration of fluid due to the increased risk of infection within the knee due to recent surgical intervention.    Imaging:    All imaging from today was reviewed by myself and explained to the patient.     Weight bearing:  as tolerated     ROM:  Full    Brace:  No brace needed    DVT Prophylaxis:  Aspirin 81 mg oral twice daily x 3 weeks post-op  and Ambulation    Follow up:   4 weeks     Patient was advised that if they have any fevers, chills, chest pain, shortness of breath, redness or drainage from the incision, please let our office know immediately.          Chief Complaint   Patient presents with    Right Knee - Post-op       History of  "Present Illness:    The patient is a 33 y.o. female who is being evaluated post operatively 1 week s/p surgical arthroscopy of the right knee with chondroplasty of patella and cartilage biopsy, DOS: 12/14/23.    Since the prior visit, She reports no improvement. She continues to have pain anteriorly and presents with an effusion to the office today.    Pain is well controlled.  The patient is using ice to control swelling.    They have not started physical therapy.     The patient Aspirin 81 mg oral twice daily x 3 weeks post-op  and Ambulation for DVT ppx.  The patient has been ambulating without crutches.    The patient has been ambulating without a brace.    The patient denies any fevers, chills, calf pain, chest pain/shortness of breath, redness or drainage from the incision.         I have reviewed the past medical, surgical, social and family history, medications and allergies as documented in the EMR.    Review of systems: ROS is negative other than that noted in the HPI.  Constitutional: Negative for fatigue and fever.        Physical Exam:    Blood pressure 114/73, pulse 74, height 5' 11\" (1.803 m), weight 76.7 kg (169 lb), last menstrual period 11/29/2023, not currently breastfeeding.    General/Constitutional: NAD, well developed, well nourished  HENT: Normocephalic, atraumatic  CV: Intact distal pulses, regular rate  Resp: No respiratory distress or labored breathing  Lymphatic: No lymphadenopathy palpated  Neuro: Alert and Oriented x 3, no focal deficits  Psych: Normal mood, normal affect, normal judgement, normal behavior  Skin: Warm, dry, no rashes, no erythema      Knee Exam (focused):                  RIGHT LEFT   ROM:   0-100, limited by effusion  0-130   Palpation: Effusion moderate negative     MJL tenderness Negative Negative     LJL tenderness Negative Negative   Instability: Varus stable stable     Valgus stable stable   Special Tests: Lachman Negative Negative     Posterior drawer Negative " Negative     Anterior drawer Negative Negative     Pivot shift not tested not tested     Dial not tested not tested   Patella: Palpation medial facet ttp, lateral facet ttp, and inferior pole ttp no tenderness     Mobility dislocatable 1/4     Apprehension Positive Negative   Other: Single leg 1/4 squat not tested not tested      Incisions show no erythema, no drainage    LE NV Exam: +2 DP/PT pulses bilaterally  Sensation intact to light touch L2-S1 bilaterally     Bilateral hip ROM demonstrates no pain actively or passively    No calf tenderness to palpation bilaterally

## 2023-12-21 ENCOUNTER — TELEPHONE (OUTPATIENT)
Age: 33
End: 2023-12-21

## 2023-12-21 NOTE — TELEPHONE ENCOUNTER
Pt called stating she had an electric shock type feeling in her left eye, swollen eyelid and there is a small blister like patch on the outside of her eye by her eyebrow,  Patch is red, eye is not red . Pt called eye doctor and they said to contact PCP incase it could be shingles   Please advise  There are no openings today

## 2023-12-22 ENCOUNTER — OFFICE VISIT (OUTPATIENT)
Dept: FAMILY MEDICINE CLINIC | Facility: CLINIC | Age: 33
End: 2023-12-22
Payer: COMMERCIAL

## 2023-12-22 VITALS
BODY MASS INDEX: 22.73 KG/M2 | HEIGHT: 71 IN | SYSTOLIC BLOOD PRESSURE: 111 MMHG | OXYGEN SATURATION: 99 % | TEMPERATURE: 96.6 F | RESPIRATION RATE: 16 BRPM | WEIGHT: 162.4 LBS | DIASTOLIC BLOOD PRESSURE: 77 MMHG | HEART RATE: 88 BPM

## 2023-12-22 DIAGNOSIS — B02.39 SHINGLES OF EYELID: Primary | ICD-10-CM

## 2023-12-22 PROCEDURE — 99214 OFFICE O/P EST MOD 30 MIN: CPT | Performed by: NURSE PRACTITIONER

## 2023-12-22 RX ORDER — VALACYCLOVIR HYDROCHLORIDE 1 G/1
1000 TABLET, FILM COATED ORAL 3 TIMES DAILY
Qty: 21 TABLET | Refills: 0 | Status: SHIPPED | OUTPATIENT
Start: 2023-12-22 | End: 2023-12-29

## 2023-12-22 RX ORDER — GABAPENTIN 300 MG/1
300 CAPSULE ORAL 3 TIMES DAILY
Qty: 30 CAPSULE | Refills: 0 | Status: SHIPPED | OUTPATIENT
Start: 2023-12-22

## 2023-12-22 NOTE — ASSESSMENT & PLAN NOTE
Start valacyclovir tid x 7 days, pt also needs to schedule a visit with her eye doctor as soon as possible.  She is given prescription for gabapentin 300 mg tid prn to take should her pain worsen.  Pt instructed to call for reevaluation if sx worsen or persist.

## 2023-12-22 NOTE — PROGRESS NOTES
Name: Cinthia Ortiz      : 1990      MRN: [id number]  Encounter Provider: DIANA Simon  Encounter Date: 2023   Encounter department: [facility]    Assessment & Plan     1. Shingles of eyelid  Assessment & Plan:  Start valacyclovir tid x 7 days, pt also needs to schedule a visit with her eye doctor as soon as possible.  She is given prescription for gabapentin 300 mg tid prn to take should her pain worsen.  Pt instructed to call for reevaluation if sx worsen or persist.      Orders:  -     valACYclovir (VALTREX) 1,000 mg tablet; Take 1 tablet (1,000 mg total) by mouth 3 (three) times a day for 7 days  -     gabapentin (Neurontin) 300 mg capsule; Take 1 capsule (300 mg total) by mouth 3 (three) times a day           Subjective      Pt is a 33 y.o. y/o female who is seen today for evaluation of eyelid swelling.  She started about 3 days ago with eye irritation and she thought she was getting a pimple but when she touched it it felt like electric shocks were going across her left eye.  The area beside her medial left brow that initially looked like a pimple has progressed to a large patch with a vesicular appearance.  She has chills, baseline fatigue.  There is a swollen lymph node below her left ear.  Vision is questionably worse than usual, she says the redness in her eye started today.  This morning her eye was crusty, no discharge otherwise.      Review of Systems   Constitutional:  Positive for chills and fatigue. Negative for fever.   Eyes:  Positive for pain, discharge, redness and visual disturbance.   Musculoskeletal:  Positive for neck pain.   Skin:  Positive for rash.   Neurological:  Negative for light-headedness and headaches.   Hematological:  Positive for adenopathy.       Current Outpatient Medications on File Prior to Visit   Medication Sig    Ascorbic Acid (VITAMIN C PO) Take by mouth in the morning    aspirin (ECOTRIN LOW STRENGTH) 81 mg EC tablet Take 1  "tablet (81 mg total) by mouth 2 (two) times a day for 21 days    hydrochlorothiazide (HYDRODIURIL) 25 mg tablet TAKE 1 TABLET (25 MG TOTAL) BY MOUTH DAILY.    Multiple Vitamin (MULTI-VITAMIN PO) Take by mouth in the morning    ondansetron (ZOFRAN-ODT) 4 mg disintegrating tablet DISSOLVE 1 TABLET BY MOUTH EVERY 8 HOURS AS NEEDED FOR NAUSEA/ VOMITING    oxyCODONE (ROXICODONE) 5 immediate release tablet Take 1 tablet (5 mg total) by mouth every 4 (four) hours as needed for moderate pain for up to 15 doses Max Daily Amount: 30 mg (Patient not taking: Reported on 12/20/2023)       Objective     /77 (BP Location: Right arm, Patient Position: Sitting, Cuff Size: Adult)   Pulse 88   Temp (!) 96.6 °F (35.9 °C) (Temporal)   Resp 16   Ht 5' 11\" (1.803 m)   Wt 73.7 kg (162 lb 6.4 oz)   LMP 11/29/2023 (Exact Date)   SpO2 99%   BMI 22.65 kg/m²     Physical Exam  Vitals reviewed.   Constitutional:       General: She is awake.      Appearance: She is well-developed. She is not ill-appearing or diaphoretic.   HENT:      Head: Normocephalic.     Eyes:      General: Lids are normal.         Left eye: No discharge.      Conjunctiva/sclera:      Right eye: Right conjunctiva is not injected.      Left eye: Left conjunctiva is injected. No exudate.     Comments: There appears to be a small clear vesicle forming left upper lash line   Cardiovascular:      Rate and Rhythm: Normal rate and regular rhythm.   Pulmonary:      Effort: Pulmonary effort is normal. No respiratory distress.   Skin:     General: Skin is dry.      Findings: Rash present. Rash is vesicular.      Comments: Yellow colored vesicular area on an erythematic base starting medial to the left brow.  There is not drainage noted, no crusting of lesions.         Neurological:      Mental Status: She is alert and oriented to person, place, and time.   Psychiatric:         Attention and Perception: Attention normal.         Mood and Affect: Mood normal.         " Speech: Speech normal.         Behavior: Behavior normal. Behavior is cooperative.         Thought Content: Thought content normal.         Judgment: Judgment normal.       DIANA Simon

## 2024-01-01 DIAGNOSIS — Z98.890 S/P ARTHROSCOPIC SURGERY OF RIGHT KNEE: ICD-10-CM

## 2024-01-02 RX ORDER — ASPIRIN 81 MG/1
TABLET, COATED ORAL
Qty: 42 TABLET | Refills: 0 | Status: SHIPPED | OUTPATIENT
Start: 2024-01-02

## 2024-01-09 DIAGNOSIS — R60.0 BILATERAL LEG EDEMA: ICD-10-CM

## 2024-01-09 RX ORDER — HYDROCHLOROTHIAZIDE 25 MG/1
25 TABLET ORAL DAILY
Qty: 30 TABLET | Refills: 5 | Status: SHIPPED | OUTPATIENT
Start: 2024-01-09

## 2024-01-16 ENCOUNTER — HOSPITAL ENCOUNTER (EMERGENCY)
Facility: HOSPITAL | Age: 34
Discharge: HOME | End: 2024-01-16
Attending: EMERGENCY MEDICINE | Admitting: EMERGENCY MEDICINE
Payer: COMMERCIAL

## 2024-01-16 VITALS
OXYGEN SATURATION: 98 % | DIASTOLIC BLOOD PRESSURE: 76 MMHG | RESPIRATION RATE: 20 BRPM | TEMPERATURE: 97.8 F | HEART RATE: 75 BPM | SYSTOLIC BLOOD PRESSURE: 117 MMHG

## 2024-01-16 DIAGNOSIS — B02.29 HZV (HERPES ZOSTER VIRUS) POST HERPETIC NEURALGIA: Primary | ICD-10-CM

## 2024-01-16 DIAGNOSIS — R55 NEAR SYNCOPE: ICD-10-CM

## 2024-01-16 LAB
ALBUMIN SERPL BCP-MCNC: 4.7 G/DL (ref 3.5–5)
ALP SERPL-CCNC: 43 U/L (ref 34–104)
ALT SERPL W P-5'-P-CCNC: 11 U/L (ref 7–52)
ANION GAP SERPL CALCULATED.3IONS-SCNC: 8 MMOL/L
AST SERPL W P-5'-P-CCNC: 13 U/L (ref 13–39)
BASOPHILS # BLD AUTO: 0.04 THOUSANDS/ÂΜL (ref 0–0.1)
BASOPHILS NFR BLD AUTO: 1 % (ref 0–1)
BILIRUB SERPL-MCNC: 1.04 MG/DL (ref 0.2–1)
BUN SERPL-MCNC: 17 MG/DL (ref 5–25)
CALCIUM SERPL-MCNC: 9.6 MG/DL (ref 8.4–10.2)
CHLORIDE SERPL-SCNC: 103 MMOL/L (ref 96–108)
CO2 SERPL-SCNC: 26 MMOL/L (ref 21–32)
CREAT SERPL-MCNC: 0.74 MG/DL (ref 0.6–1.3)
EOSINOPHIL # BLD AUTO: 0.14 THOUSAND/ÂΜL (ref 0–0.61)
EOSINOPHIL NFR BLD AUTO: 2 % (ref 0–6)
ERYTHROCYTE [DISTWIDTH] IN BLOOD BY AUTOMATED COUNT: 13.1 % (ref 11.6–15.1)
EXT PREGNANCY TEST URINE: NEGATIVE
EXT. CONTROL: NORMAL
GFR SERPL CREATININE-BSD FRML MDRD: 108 ML/MIN/1.73SQ M
GLUCOSE SERPL-MCNC: 70 MG/DL (ref 65–140)
GLUCOSE SERPL-MCNC: 93 MG/DL (ref 65–140)
HCT VFR BLD AUTO: 44.2 % (ref 34.8–46.1)
HGB BLD-MCNC: 14.4 G/DL (ref 11.5–15.4)
IMM GRANULOCYTES # BLD AUTO: 0.02 THOUSAND/UL (ref 0–0.2)
IMM GRANULOCYTES NFR BLD AUTO: 0 % (ref 0–2)
LYMPHOCYTES # BLD AUTO: 2.11 THOUSANDS/ÂΜL (ref 0.6–4.47)
LYMPHOCYTES NFR BLD AUTO: 29 % (ref 14–44)
MCH RBC QN AUTO: 31.5 PG (ref 26.8–34.3)
MCHC RBC AUTO-ENTMCNC: 32.6 G/DL (ref 31.4–37.4)
MCV RBC AUTO: 97 FL (ref 82–98)
MONOCYTES # BLD AUTO: 0.47 THOUSAND/ÂΜL (ref 0.17–1.22)
MONOCYTES NFR BLD AUTO: 7 % (ref 4–12)
NEUTROPHILS # BLD AUTO: 4.46 THOUSANDS/ÂΜL (ref 1.85–7.62)
NEUTS SEG NFR BLD AUTO: 61 % (ref 43–75)
NRBC BLD AUTO-RTO: 0 /100 WBCS
PLATELET # BLD AUTO: 312 THOUSANDS/UL (ref 149–390)
PMV BLD AUTO: 9.4 FL (ref 8.9–12.7)
POTASSIUM SERPL-SCNC: 3.7 MMOL/L (ref 3.5–5.3)
PROT SERPL-MCNC: 7.5 G/DL (ref 6.4–8.4)
RBC # BLD AUTO: 4.57 MILLION/UL (ref 3.81–5.12)
SODIUM SERPL-SCNC: 137 MMOL/L (ref 135–147)
WBC # BLD AUTO: 7.24 THOUSAND/UL (ref 4.31–10.16)

## 2024-01-16 PROCEDURE — 80053 COMPREHEN METABOLIC PANEL: CPT

## 2024-01-16 PROCEDURE — 99284 EMERGENCY DEPT VISIT MOD MDM: CPT

## 2024-01-16 PROCEDURE — 36415 COLL VENOUS BLD VENIPUNCTURE: CPT

## 2024-01-16 PROCEDURE — 82948 REAGENT STRIP/BLOOD GLUCOSE: CPT

## 2024-01-16 PROCEDURE — 81025 URINE PREGNANCY TEST: CPT

## 2024-01-16 PROCEDURE — 85025 COMPLETE CBC W/AUTO DIFF WBC: CPT

## 2024-01-16 PROCEDURE — 93005 ELECTROCARDIOGRAM TRACING: CPT

## 2024-01-16 PROCEDURE — 99285 EMERGENCY DEPT VISIT HI MDM: CPT | Performed by: EMERGENCY MEDICINE

## 2024-01-16 NOTE — ED ATTENDING ATTESTATION
1/16/2024  IMarcos MD, saw and evaluated the patient. I have discussed the patient with the resident/non-physician practitioner and agree with the resident's/non-physician practitioner's findings, Plan of Care, and MDM as documented in the resident's/non-physician practitioner's note, except where noted. All available labs and Radiology studies were reviewed.  I was present for key portions of any procedure(s) performed by the resident/non-physician practitioner and I was immediately available to provide assistance.       At this point I agree with the current assessment done in the Emergency Department.  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  Patient has been dealing with a bout of this for the past few weeks.  She has been under the care of an ophthalmologist, has been receiving antiviral medications, and was recently seen at her ophthalmologist.  Patient presents today for a presyncopal episode just prior to which she had a feeling of burning in her left eye, the affected eye from her herpes ophthalmicus, which caused her to feel flush and feel like she was going to lose consciousness.  She denies any preceding or subsequent chest pain, pressure, discomfort, shortness of breath, infectious symptoms, or any other significantly related symptoms.  The rest of her pain symptoms are similar to her baseline pain symptoms.  She, on exam, has a rash on the left side of her face, with some injected conjunctive a on the left side.  The rest of her exam for the most part is unremarkable.  Her differential diagnosis includes: Vasovagal reaction versus atypical ACS versus arrhythmia versus herpes ophthalmicus neuralgia versus other.  Patient has CBC, metabolic panel, EKG all of which  were unremarkable.EKG does not reveal any evidence of acute ischemia, significant bradycardia, advanced AV blocks, WPW, prolonged QTC, Brugada syndrome, hypertrophic cardiomyopathy, arrhythmogenic right ventricular dysplasia.  We did discuss with her that we do not think that is likely that she has meningitis or any other significant neurologic issue, considering she does not have any focal neurologic abnormalities, does not have any nuchal rigidity, and aside from her visual complaints as well as her headache which has been chronic, unchanged, patient does not have any new symptoms aside from the brief burning sensation.  We discussed this with neurology as well, whom agreed with us.  Patient will follow-up with her PCP as an outpatient.  The management plan was discussed in detail with the patient at bedside and all questions were answered. Strict ED return instructions were discussed at bedside. Prior to discharge, both verbal and written instructions were provided. We discussed the signs and symptoms that should prompt the patient to return to the ED. All questions were answered and the patient was comfortable with the plan of care and discharged home. The patient agrees to return to the Emergency Department for concerns and/or progression of illness.    ED Course         Critical Care Time  Procedures

## 2024-01-16 NOTE — ED PROVIDER NOTES
History  Chief Complaint   Patient presents with    Dizziness     Arrives to unit as medical emergency. Diagnosed with shingles before Christmas and currently undergoing treatment for same. Started with complication in left cornea this week. Today suddenly felt burning in left side of face, dizzy and nauseous.      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  She is a nurse in the hospital and reports that her left face was suddenly having a burning sensation that felt similar to fire and then became very lightheaded and flushed.  She did not lose consciousness and reports that this entire episode lasted about 1 minute.  She is now feeling almost completely back to baseline but is having some pain behind the left eye.  She denies global headache, blurred vision, nausea, vomiting, and is otherwise feeling back to normal.  She is currently being treated with her second round of antiviral medication in addition to eyedrops as she does have dendritic lesions on the left eye.  She was seen and evaluated by ophthalmology yesterday and reportedly is being treated appropriately.  Patient also has a history of of retinal detachment of the left eye which has been repaired in the past.  She denies any flashes, spots, or blurred vision at this time.  Additionally, her mother has MS and there were no abnormal findings on retinal exam yesterday.  She has not had an MRI to this point.  She denies any fevers, chest pain, shortness of breath, abdominal pain, weakness, numbness, or tingling.        Prior to Admission Medications   Prescriptions Last Dose Informant Patient Reported? Taking?   Ascorbic Acid (VITAMIN C PO)  Self Yes No   Sig: Take by mouth in the morning   Aspirin Low Dose 81 MG EC tablet   No No   Sig: TAKE 1  TABLET (81 MG TOTAL) BY MOUTH 2 (TWO) TIMES A DAY FOR 21 DAYS   Multiple Vitamin (MULTI-VITAMIN PO)  Self Yes No   Sig: Take by mouth in the morning   gabapentin (Neurontin) 300 mg capsule   No No   Sig: Take 1 capsule (300 mg total) by mouth 3 (three) times a day   hydrochlorothiazide (HYDRODIURIL) 25 mg tablet   No No   Sig: TAKE 1 TABLET (25 MG TOTAL) BY MOUTH DAILY.   ondansetron (ZOFRAN-ODT) 4 mg disintegrating tablet  Self Yes No   Sig: DISSOLVE 1 TABLET BY MOUTH EVERY 8 HOURS AS NEEDED FOR NAUSEA/ VOMITING   oxyCODONE (ROXICODONE) 5 immediate release tablet   No No   Sig: Take 1 tablet (5 mg total) by mouth every 4 (four) hours as needed for moderate pain for up to 15 doses Max Daily Amount: 30 mg   Patient not taking: Reported on 12/20/2023   valACYclovir (VALTREX) 1,000 mg tablet   No No   Sig: Take 1 tablet (1,000 mg total) by mouth 3 (three) times a day for 7 days      Facility-Administered Medications: None       Past Medical History:   Diagnosis Date    Abnormal Pap smear of cervix     Arthritis     Osteoarthritis    Asthma     as child    HPV (human papilloma virus) infection     Miscarriage     Osteoarthritis     Varicella        Past Surgical History:   Procedure Laterality Date    KNEE SURGERY Right     MCL repair    TX ARTHRS KNEE DEBRIDEMENT/SHAVING ARTCLR CRTLG Right 12/14/2023    Procedure: ARTHROSCOPY KNEE- chondroplasty with cartilage biopsy;  Surgeon: Johnathon Clark DO;  Location: [facility];  Service: Orthopedics       Family History   Problem Relation Age of Onset    Multiple sclerosis Mother     Thyroid disease Mother     Hypertension Mother     Miscarriages / Stillbirths Mother     Hyperlipidemia Father     Breast cancer Cousin     Heart attack Maternal Grandfather     Heart disease Maternal Grandfather     Heart attack Paternal Grandfather     Alzheimer's disease Paternal Grandfather     Diabetes Paternal Grandfather     Heart attack Maternal Grandmother     Bipolar disorder Cousin       I have reviewed and agree with the history as documented.    E-Cigarette/Vaping    E-Cigarette Use Never User      E-Cigarette/Vaping Substances    Nicotine No     THC No     CBD No     Flavoring No     Other No     Unknown No      Social History     Tobacco Use    Smoking status: Never    Smokeless tobacco: Never   Vaping Use    Vaping status: Never Used   Substance Use Topics    Alcohol use: Yes     Alcohol/week: 3.0 standard drinks of alcohol     Types: 3 Standard drinks or equivalent per week     Comment: Socially    Drug use: Never        Review of Systems   Constitutional:  Negative for chills, fatigue and fever.   HENT:  Negative for congestion, ear pain and sore throat.    Eyes:  Positive for photophobia (Left eye, present prior to event today) and pain (Left eye consistent with prior diagnosed herpes zoster ophthalmicus.  New mild pain behind the left eye). Negative for visual disturbance.   Respiratory:  Negative for cough and shortness of breath.    Cardiovascular:  Negative for chest pain and palpitations.   Gastrointestinal:  Negative for abdominal pain, constipation, nausea and vomiting.   Genitourinary:  Negative for dysuria, hematuria, vaginal bleeding and vaginal discharge.   Musculoskeletal:  Negative for arthralgias and back pain.   Skin:  Positive for rash (Left forehead). Negative for color change.   Neurological:  Negative for dizziness, seizures, syncope, light-headedness, numbness and headaches.        Burning sensation to the left face.  Resolved.   All other systems reviewed and are negative.      Physical Exam  ED Triage Vitals [01/16/24 1405]   Temperature Pulse Respirations Blood Pressure SpO2   97.8 °F (36.6 °C) 88 20 132/73 97 %      Temp Source Heart Rate Source Patient Position - Orthostatic VS BP Location FiO2 (%)   Oral Monitor Sitting Right arm --      Pain Score       No Pain             Orthostatic Vital Signs  Vitals:    01/16/24 1405 01/16/24 1431 01/16/24 1438 01/16/24  1530   BP: 132/73   117/76   Pulse: 88 77 82 75   Patient Position - Orthostatic VS: Sitting   Sitting       Physical Exam  Vitals and nursing note reviewed.   Constitutional:       General: She is not in acute distress.     Appearance: Normal appearance. She is well-developed. She is not ill-appearing.   HENT:      Head: Normocephalic and atraumatic.      Right Ear: External ear normal.      Left Ear: External ear normal.      Nose: Nose normal.      Mouth/Throat:      Pharynx: Oropharynx is clear. No oropharyngeal exudate or posterior oropharyngeal erythema.   Eyes:      General: Vision grossly intact. Gaze aligned appropriately.         Right eye: No discharge.         Left eye: No discharge.      Extraocular Movements: Extraocular movements intact.      Conjunctiva/sclera:      Right eye: Right conjunctiva is not injected.      Left eye: Left conjunctiva is injected.      Pupils: Pupils are equal, round, and reactive to light.   Cardiovascular:      Rate and Rhythm: Normal rate and regular rhythm.      Pulses: Normal pulses.      Heart sounds: Normal heart sounds. No murmur heard.  Pulmonary:      Effort: Pulmonary effort is normal. No respiratory distress.      Breath sounds: Normal breath sounds. No wheezing, rhonchi or rales.   Musculoskeletal:         General: No swelling or deformity. Normal range of motion.      Cervical back: Neck supple. No tenderness.   Skin:     General: Skin is warm and dry.      Capillary Refill: Capillary refill takes less than 2 seconds.      Findings: Rash (Left forehead consistent with herpes zoster ophthalmicus) present.   Neurological:      Mental Status: She is alert and oriented to person, place, and time.      Cranial Nerves: No cranial nerve deficit.      Sensory: No sensory deficit.      Motor: No weakness.      Coordination: Coordination normal.      Gait: Gait normal.         ED Medications  Medications - No data to display    Diagnostic Studies  Results Reviewed        Procedure Component Value Units Date/Time    POCT pregnancy, urine [[id number]]  (Normal) Resulted: 01/16/24 1543    Lab Status: Final result Updated: 01/16/24 1544     EXT Preg Test, Ur Negative     Control Valid    Comprehensive metabolic panel [[id number]]  (Abnormal) Collected: 01/16/24 1433    Lab Status: Final result Specimen: Blood from Arm, Left Updated: 01/16/24 1501     Sodium 137 mmol/L      Potassium 3.7 mmol/L      Chloride 103 mmol/L      CO2 26 mmol/L      ANION GAP 8 mmol/L      BUN 17 mg/dL      Creatinine 0.74 mg/dL      Glucose 70 mg/dL      Calcium 9.6 mg/dL      AST 13 U/L      ALT 11 U/L      Alkaline Phosphatase 43 U/L      Total Protein 7.5 g/dL      Albumin 4.7 g/dL      Total Bilirubin 1.04 mg/dL      eGFR 108 ml/min/1.73sq m     Narrative:      National Kidney Disease Foundation guidelines for Chronic Kidney Disease (CKD):     Stage 1 with normal or high GFR (GFR > 90 mL/min/1.73 square meters)    Stage 2 Mild CKD (GFR = 60-89 mL/min/1.73 square meters)    Stage 3A Moderate CKD (GFR = 45-59 mL/min/1.73 square meters)    Stage 3B Moderate CKD (GFR = 30-44 mL/min/1.73 square meters)    Stage 4 Severe CKD (GFR = 15-29 mL/min/1.73 square meters)    Stage 5 End Stage CKD (GFR <15 mL/min/1.73 square meters)  Note: GFR calculation is accurate only with a steady state creatinine    CBC and differential [[id number]] Collected: 01/16/24 1433    Lab Status: Final result Specimen: Blood from Arm, Left Updated: 01/16/24 1449     WBC 7.24 Thousand/uL      RBC 4.57 Million/uL      Hemoglobin 14.4 g/dL      Hematocrit 44.2 %      MCV 97 fL      MCH 31.5 pg      MCHC 32.6 g/dL      RDW 13.1 %      MPV 9.4 fL      Platelets 312 Thousands/uL      nRBC 0 /100 WBCs      Neutrophils Relative 61 %      Immat GRANS % 0 %      Lymphocytes Relative 29 %      Monocytes Relative 7 %      Eosinophils Relative 2 %      Basophils Relative 1 %      Neutrophils Absolute 4.46 Thousands/µL      Immature Grans Absolute 0.02  Thousand/uL      Lymphocytes Absolute 2.11 Thousands/µL      Monocytes Absolute 0.47 Thousand/µL      Eosinophils Absolute 0.14 Thousand/µL      Basophils Absolute 0.04 Thousands/µL     Fingerstick Glucose (POCT) [[id number]]  (Normal) Collected: 01/16/24 1436    Lab Status: Final result Updated: 01/16/24 1437     POC Glucose 93 mg/dl                    No orders to display         Procedures  ECG 12 Lead Documentation Only    Date/Time: 1/16/2024 2:45 PM    Performed by: Esteban Lyle DO  Authorized by: Esteban Lyle DO    Indications / Diagnosis:  Near syncope  ECG reviewed by me, the ED Provider: yes    Patient location:  ED  Previous ECG:     Previous ECG:  Unavailable  Interpretation:     Interpretation: normal    Quality:     Tracing quality:  Limited by artifact  Rate:     ECG rate:  79    ECG rate assessment: normal    Rhythm:     Rhythm: sinus rhythm    Ectopy:     Ectopy: none    QRS:     QRS axis:  Normal    QRS intervals:  Normal  Conduction:     Conduction: normal    ST segments:     ST segments:  Normal  T waves:     T waves: normal    Comments:      EKG does not suggest:  HOCM (massive TWI, dagger Qs in lateral/inferior leads)  WPW (delta wave)  Brugada (coved st elevation with TWI, saddleback YULY or a small amount of YULY)          ED Course                                       Medical Decision Making  33F with recent diagnosis of 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  This entire episode lasted roughly 1 minute.  On exam, patient has left-sided facial rash consistent with herpes zoster ophthalmicus.  She is currently receiving antiviral therapy both oral and drops.  She denies vision changes, pain within her eye, global headache, weakness, numbness, tingling, difficulty walking/talking/swallowing.  She reports mild pain behind the  left eye.    Laboratory evaluation including CBC, CMP, fingerstick blood glucose, and point-of-care pregnancy test all within normal limits other than mildly elevated total bilirubin consistent with patient's antiviral use.    ECG nonconcerning for acute ischemia or dysrhythmia.   No evidence of: HOCM (massive TWI, dagger Qs in lateral/inferior leads), WPW (delta wave), Brugada (coved st elevation with TWI, saddleback YULY or a small amount of YULY)    Low suspicion for viral meningitis at this time given absence of fevers, headache, or neurologic deficits.  Highest suspicion at this time for herpetic neuralgia with subsequent vasovagal syncope due to pain.  Patient's case discussed with neurologist on-call, Dr. Skyler Cross who also reports low suspicion for meningitis based on my description and therefore does not feel strongly that she should have an LP at this time.  He is also more suspicious for herpetic neuralgia and perhaps vasovagal syncope in the setting of pain.    Discussed risk and benefits of lumbar puncture for viral meningitis evaluation and patient also agrees that it is appropriate to forego the lumbar puncture at this time with the caveat that if symptoms return or become severe that she can always return for additional testing including possible lumbar puncture.    Patient also with family history of MS and patient encouraged to follow-up with her PCP for evaluation of possible MS symptoms with LP and/or MRI.    In the absence of additional concerning findings on physical exam, laboratory evaluation, or ecg patient is appropriate for discharge at this time.  Additionally, patient provided with informational handout that discusses additional reasons to return to the emergency department.  Return precautions are discussed with the patient and they demonstrate understanding of the plan.  Their questions are all answered to their satisfaction and the patient is discharged.    Amount and/or Complexity of  Data Reviewed  Labs: ordered.          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                   Follow-up Information       Follow up With Specialties Details Why Contact Info Additional Information    [facility], Internal Medicine, Nurse Practitioner Call  As needed [address] [place] [address] [id number] [phone]       [facility] Emergency Department Emergency Medicine Go to  If symptoms worsen [address] [place] [address]  [phone] [place] [address] Emergency Department, [address] [place] [address], [facility]            Discharge Medication List as of 1/16/2024  3:25 PM        CONTINUE these medications which have NOT CHANGED    Details   Ascorbic Acid (VITAMIN C PO) Take by mouth in the morning, Historical Med      Aspirin Low Dose 81 MG EC tablet TAKE 1 TABLET (81 MG TOTAL) BY MOUTH 2 (TWO) TIMES A DAY FOR 21 DAYS, Normal      gabapentin (Neurontin) 300 mg capsule Take 1 capsule (300 mg total) by mouth 3 (three) times a day, Starting Fri 12/22/2023, Normal      hydrochlorothiazide (HYDRODIURIL) 25 mg tablet TAKE 1 TABLET (25 MG TOTAL) BY MOUTH DAILY., Starting Tue 1/9/2024, Normal      Multiple Vitamin (MULTI-VITAMIN PO) Take by mouth in the morning, Historical Med      ondansetron (ZOFRAN-ODT) 4 mg disintegrating tablet DISSOLVE 1 TABLET BY MOUTH EVERY 8  HOURS AS NEEDED FOR NAUSEA/ VOMITING, Historical Med      oxyCODONE (ROXICODONE) 5 immediate release tablet Take 1 tablet (5 mg total) by mouth every 4 (four) hours as needed for moderate pain for up to 15 doses Max Daily Amount: 30 mg, Starting u 12/14/2023, Normal      valACYclovir (VALTREX) 1,000 mg tablet Take 1 tablet (1,000 mg total) by mouth 3 (three) times a day for 7 days, Starting Fri 12/22/2023, Until Fri 12/29/2023, Normal           No discharge procedures on file.    PDMP Review       None             ED Provider  Attending physically available and evaluated Cinthia Ortiz. I managed the patient along with the ED Attending.    Electronically Signed by           Esteban Lyle DO  01/16/24 8151

## 2024-01-16 NOTE — ED NOTES
Pt presents as a medical emergency. 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 Currently being treated for shingles to the L facial region with recent complication of corneal lesions/abrasions to the L eye. Due to follow up Monday. Today while at work, she had a sudden onset of burning pain to the eye radiating throughout the L face. Near syncopal episode per staff. Pain is improving but still present mildly. Ambulated from floor to wheelchair and from wheelchair to stretcher in ED. Steady gait noted. Alert and oriented x4. Respirations even and unlabored on RA. RRR. Abdomen soft nontender. Redness around L eye.      Elida Atkinson, SHARDA  01/16/24 [id number]

## 2024-01-17 ENCOUNTER — OFFICE VISIT (OUTPATIENT)
Dept: FAMILY MEDICINE CLINIC | Facility: CLINIC | Age: 34
End: 2024-01-17
Payer: COMMERCIAL

## 2024-01-17 ENCOUNTER — OFFICE VISIT (OUTPATIENT)
Dept: OBGYN CLINIC | Facility: MEDICAL CENTER | Age: 34
End: 2024-01-17
Payer: COMMERCIAL

## 2024-01-17 VITALS
SYSTOLIC BLOOD PRESSURE: 101 MMHG | WEIGHT: 157 LBS | HEIGHT: 71 IN | HEART RATE: 90 BPM | BODY MASS INDEX: 21.98 KG/M2 | DIASTOLIC BLOOD PRESSURE: 67 MMHG

## 2024-01-17 VITALS
HEIGHT: 71 IN | WEIGHT: 154 LBS | SYSTOLIC BLOOD PRESSURE: 108 MMHG | DIASTOLIC BLOOD PRESSURE: 60 MMHG | BODY MASS INDEX: 21.56 KG/M2 | OXYGEN SATURATION: 98 % | HEART RATE: 79 BPM | TEMPERATURE: 98.3 F | RESPIRATION RATE: 18 BRPM

## 2024-01-17 DIAGNOSIS — B02.39 SHINGLES OF EYELID: ICD-10-CM

## 2024-01-17 DIAGNOSIS — G51.0 FACIAL PARALYSIS ON LEFT SIDE: ICD-10-CM

## 2024-01-17 DIAGNOSIS — S83.32XS: ICD-10-CM

## 2024-01-17 DIAGNOSIS — M22.01 RECURRENT DISLOCATION OF RIGHT PATELLA: ICD-10-CM

## 2024-01-17 DIAGNOSIS — M25.361 PATELLAR INSTABILITY OF RIGHT KNEE: ICD-10-CM

## 2024-01-17 DIAGNOSIS — B02.29 POST HERPETIC NEURALGIA: ICD-10-CM

## 2024-01-17 DIAGNOSIS — B02.30 HERPES ZOSTER OPHTHALMICUS OF LEFT EYE: ICD-10-CM

## 2024-01-17 DIAGNOSIS — Z98.890 S/P ARTHROSCOPIC SURGERY OF RIGHT KNEE: Primary | ICD-10-CM

## 2024-01-17 DIAGNOSIS — Z00.00 ANNUAL PHYSICAL EXAM: Primary | ICD-10-CM

## 2024-01-17 PROCEDURE — 99213 OFFICE O/P EST LOW 20 MIN: CPT | Performed by: ORTHOPAEDIC SURGERY

## 2024-01-17 PROCEDURE — 99395 PREV VISIT EST AGE 18-39: CPT | Performed by: NURSE PRACTITIONER

## 2024-01-17 RX ORDER — TRIFLURIDINE 10 MG/ML
SOLUTION OPHTHALMIC
COMMUNITY

## 2024-01-17 RX ORDER — GABAPENTIN 300 MG/1
300 CAPSULE ORAL 3 TIMES DAILY
Qty: 90 CAPSULE | Refills: 1 | Status: SHIPPED | OUTPATIENT
Start: 2024-01-17

## 2024-01-17 RX ORDER — VALACYCLOVIR HYDROCHLORIDE 1 G/1
1000 TABLET, FILM COATED ORAL 2 TIMES DAILY
COMMUNITY

## 2024-01-17 NOTE — PROGRESS NOTES
Knee Post Operative Visit     Assesment:     33 y.o. female  4 weeks s/p surgical arthroscopy of the right knee with chondroplasty of patella and cartilage biopsy, DOS: 12/14/2023, tear of articular cartilage of patella measuring 3x3cm, patellar instability.    Plan:    Post-Operative treatment:    Ice to knee for 20 minutes at least 1-2 times daily.  PT per protocol, focus on ROM and quad strengthening.  OTC NSAIDS prn for pain.  Patient signed consent for surgery today.   Patient reports she signed forms on [employer], online.  Follow-up after appointment and clearance from ophthalmologist and when we have approval for cartilage implant. Myself or my PA will call the patient once we have approval for the implant.    Imaging:    No imaging was available for review today.    Surgery  All of the risks and benefits of operative treatment were explained to the patient, as well as the risks and benefits of any alternative treatment options, including nonoperative care. The risks of surgical treatement include, but are not limited to, infection, bleeding, blood clot, neurovascular damage, need for further surgery, continued pain, cardiovascular risk, and anesthesia risk.  The patient understood this and elects to proceed forward with surgical intervention.    We will proceed forward with surgical arthroscopy of the knee with autologous chondrocyte implantation of the patella with MPFL allograft reconstruction vs quadriceps autograft MPFL quad turndown      Weight bearing:  as tolerated     ROM:  Full    Brace:  No brace needed    DVT Prophylaxis:  Ambulation    Follow up:   Follow-up after appointment and clearance from ophthalmologist and when we have approval for cartilage implant.     Patient was advised that if they have any fevers, chills, chest pain, shortness of breath, redness or drainage from the incision, please let our office know immediately.          Chief Complaint   Patient presents with    Right  "Knee - Post-op       History of Present Illness:    The patient is a 33 y.o. female who is being evaluated post operatively 4 weeks s/p surgical arthroscopy of the right knee with chondroplasty of patella and cartilage biopsy, DOS: 12/14/2023, tear of articular cartilage of patella measuring 3x3cm, patellar instability. 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    Since the prior visit, She reports significant improvement in her knee swelling and pain.    Pain is well controlled. The patient denies pain at today's visit. The patient is using ice to control swelling.    They have not started physical therapy. The patient reports she had shingles on her cornea and left side of the face that was resolving, got COVID following this, and then had another flare of shingles in the cornea. The patient reports she is following up with her PCP and opthalmologist. The patient reports her cousin is a PT and gave her a few exercises to perform.     The patient Ambulation for DVT ppx.  The patient has been ambulating without crutches.    The patient has been ambulating without a brace.    The patient denies any fevers, chills, calf pain, chest pain/shortness of breath, redness or drainage from the incision.    I have reviewed the past medical, surgical, social and family history, medications and allergies as documented in the EMR.    Review of systems: ROS is negative other than that noted in the HPI.  Constitutional: Negative for fatigue and fever.        Physical Exam:    Blood pressure 101/67, pulse 90, height 5' 11\" (1.803 m), weight 71.2 kg (157 lb), last menstrual period 12/16/2023, not currently breastfeeding.    General/Constitutional: NAD, well developed, well nourished  HENT: Normocephalic, atraumatic  CV: Intact distal pulses, regular rate  Resp: No respiratory distress or labored breathing  Lymphatic: No lymphadenopathy palpated  Neuro: Alert and Oriented x 3, no focal deficits  Psych: Normal mood, normal " affect, normal judgement, normal behavior  Skin: Warm, dry, no rashes, no erythema      Knee Exam (focused):                  RIGHT LEFT   ROM:   0-130 0-130   Palpation: Effusion negative negative     MJL tenderness Negative Negative     LJL tenderness Negative Negative   Instability: Varus stable stable     Valgus stable stable   Special Tests: Lachman Negative Negative     Posterior drawer Negative Negative     Anterior drawer Negative Negative     Pivot shift not tested not tested     Dial not tested not tested   Patella: Palpation no tenderness no tenderness     Mobility 4/4 1/4     Apprehension Positive Negative   Other: Single leg 1/4 squat not tested not tested      Incisions show no erythema, no drainage    LE NV Exam: +2 DP/PT pulses bilaterally  Sensation intact to light touch L2-S1 bilaterally     Bilateral hip ROM demonstrates no pain actively or passively    No calf tenderness to palpation bilaterally    Scribe Attestation      I,:  Melina Araujo am acting as a scribe while in the presence of the attending physician.:       I,:  Johnathon Clark, DO personally performed the services described in this documentation    as scribed in my presence.:

## 2024-01-17 NOTE — PROGRESS NOTES
ADULT ANNUAL PHYSICAL  [facility]    NAME: Cinthia Ortiz  AGE: 33 y.o. SEX: female  : 1990     DATE: 2024     Assessment and Plan:     Problem List Items Addressed This Visit          Nervous and Auditory    Post herpetic neuralgia    Relevant Orders    Ambulatory Referral to Neurology    Herpes zoster ophthalmicus of left eye    Relevant Orders    Ambulatory Referral to Neurology    Facial paralysis on left side    Relevant Medications    gabapentin (Neurontin) 300 mg capsule    Other Relevant Orders    Ambulatory Referral to Neurology       Other    Shingles of eyelid    Relevant Medications    gabapentin (Neurontin) 300 mg capsule     Other Visit Diagnoses       Annual physical exam    -  Primary              Immunizations and preventive care screenings were discussed with patient today. Appropriate education was printed on patient's after visit summary.    Counseling:  Alcohol/drug use: discussed moderation in alcohol intake, the recommendations for healthy alcohol use, and avoidance of illicit drug use.  Dental Health: discussed importance of regular tooth brushing, flossing, and dental visits.  Injury prevention: discussed safety/seat belts, safety helmets, smoke detectors, carbon dioxide detectors, and smoking near bedding or upholstery.  Sexual health: discussed sexually transmitted diseases, partner selection, use of condoms, avoidance of unintended pregnancy, and contraceptive alternatives.  Exercise: the importance of regular exercise/physical activity was discussed. Recommend exercise 3-5 times per week for at least 30 minutes.       Depression Screening and Follow-up Plan: Patient was screened for depression during today's encounter. They screened negative with a PHQ-2 score of 0.        Return in about 2 months (around 3/17/2024) for Recheck.     Chief Complaint:     Chief Complaint   Patient presents with    Physical Exam     Pt been  seeing for Physical Exam      History of Present Illness:     Adult Annual Physical   Patient here for a comprehensive physical exam. The patient reports problems - recovering from shingles on left side of face and has it in her left eye, following with ophthalmology for this.  Had left facial paralysis and felt sick suddenly, had to go to ED, family hx of MS .  Had temporary left facial nerve palsy, back to normal today    Diet and Physical Activity  Diet/Nutrition: well balanced diet.   Exercise: 3-4 times a week on average.      Depression Screening  PHQ-2/9 Depression Screening    Little interest or pleasure in doing things: 0 - not at all  Feeling down, depressed, or hopeless: 0 - not at all  PHQ-2 Score: 0  PHQ-2 Interpretation: Negative depression screen       General Health  Sleep: sleeps well.   Hearing: normal - bilateral.  Vision: wears glasses.   Dental: regular dental visits and brushes teeth once daily.       /GYN Health  Follows with gynecology? yes   Last menstrual period: 1/2024  Contraceptive method: barrier methods.  History of STDs?: no.     Advanced Care Planning  Do you have an advanced directive? no  Do you have a durable medical power of ? no     Review of Systems:     Review of Systems   Constitutional:  Negative for fatigue.   HENT:  Negative for congestion, ear pain, postnasal drip and rhinorrhea.    Eyes:  Positive for photophobia, redness and visual disturbance.   Respiratory:  Negative for cough, shortness of breath and wheezing.    Cardiovascular:  Negative for chest pain and palpitations.   Gastrointestinal:  Negative for constipation, diarrhea, nausea and vomiting.   Genitourinary:  Negative for dysuria and frequency.   Musculoskeletal:  Negative for arthralgias and myalgias.   Skin:  Negative for rash.   Neurological:  Positive for headaches. Negative for dizziness and light-headedness.   Hematological:  Negative for adenopathy.   Psychiatric/Behavioral:  Negative for  dysphoric mood and sleep disturbance. The patient is not nervous/anxious.       Past Medical History:     Past Medical History:   Diagnosis Date    Abnormal Pap smear of cervix     Arthritis     Osteoarthritis    Asthma     as child    HPV (human papilloma virus) infection     Miscarriage     Osteoarthritis     Varicella       Past Surgical History:     Past Surgical History:   Procedure Laterality Date    KNEE SURGERY Right     MCL repair    LA ARTHRS KNEE DEBRIDEMENT/SHAVING ARTCLR CRTLG Right 12/14/2023    Procedure: ARTHROSCOPY KNEE- chondroplasty with cartilage biopsy;  Surgeon: Johnathon Clark DO;  Location:  MAIN OR;  Service: Orthopedics      Social History:     Social History     Socioeconomic History    Marital status: Single     Spouse name: None    Number of children: None    Years of education: None    Highest education level: None   Occupational History    None   Tobacco Use    Smoking status: Never    Smokeless tobacco: Never   Vaping Use    Vaping status: Never Used   Substance and Sexual Activity    Alcohol use: Yes     Alcohol/week: 3.0 standard drinks of alcohol     Types: 3 Standard drinks or equivalent per week     Comment: Socially    Drug use: Never    Sexual activity: Yes     Partners: Male     Birth control/protection: Condom Male     Comment: sporadic   Other Topics Concern    None   Social History Narrative    None     Social Determinants of Health     Financial Resource Strain: Not on file   Food Insecurity: Not on file   Transportation Needs: Not on file   Physical Activity: Not on file   Stress: Not on file   Social Connections: Not on file   Intimate Partner Violence: Not on file   Housing Stability: Not on file      Family History:     Family History   Problem Relation Age of Onset    Multiple sclerosis Mother     Thyroid disease Mother     Hypertension Mother     Miscarriages / Stillbirths Mother     Hyperlipidemia Father     Breast cancer Cousin     Heart attack Maternal  "Grandfather     Heart disease Maternal Grandfather     Heart attack Paternal Grandfather     Alzheimer's disease Paternal Grandfather     Diabetes Paternal Grandfather     Heart attack Maternal Grandmother     Bipolar disorder Cousin       Current Medications:     Current Outpatient Medications   Medication Sig Dispense Refill    Ascorbic Acid (VITAMIN C PO) Take by mouth in the morning      gabapentin (Neurontin) 300 mg capsule Take 1 capsule (300 mg total) by mouth 3 (three) times a day 90 capsule 1    hydrochlorothiazide (HYDRODIURIL) 25 mg tablet TAKE 1 TABLET (25 MG TOTAL) BY MOUTH DAILY. 30 tablet 5    Multiple Vitamin (MULTI-VITAMIN PO) Take by mouth in the morning      ondansetron (ZOFRAN-ODT) 4 mg disintegrating tablet DISSOLVE 1 TABLET BY MOUTH EVERY 8 HOURS AS NEEDED FOR NAUSEA/ VOMITING      trifluridine (VIROPTIC) 1 % ophthalmic solution       valACYclovir (VALTREX) 1,000 mg tablet Take 1,000 mg by mouth 2 (two) times a day       No current facility-administered medications for this visit.      Allergies:     No Known Allergies   Physical Exam:     /60 (BP Location: Left arm, Patient Position: Sitting, Cuff Size: Standard)   Pulse 79   Temp 98.3 °F (36.8 °C) (Tympanic)   Resp 18   Ht 5' 10.87\" (1.8 m)   Wt 69.9 kg (154 lb)   LMP 12/16/2023 (Approximate)   SpO2 98%   BMI 21.56 kg/m²     Physical Exam  Vitals and nursing note reviewed.   Constitutional:       Appearance: Normal appearance.   HENT:      Head: Normocephalic and atraumatic.      Right Ear: Tympanic membrane, ear canal and external ear normal.      Left Ear: Tympanic membrane, ear canal and external ear normal.      Nose: Nose normal.      Mouth/Throat:      Mouth: Mucous membranes are moist.   Eyes:      Conjunctiva/sclera:      Left eye: Left conjunctiva is injected.        Comments: Left eyelid droopy   Cardiovascular:      Rate and Rhythm: Normal rate and regular rhythm.      Heart sounds: Normal heart sounds.   Pulmonary: "      Breath sounds: Normal breath sounds.   Abdominal:      General: Bowel sounds are normal.   Musculoskeletal:         General: Normal range of motion.      Cervical back: Normal range of motion.   Skin:     General: Skin is warm.      Capillary Refill: Capillary refill takes less than 2 seconds.   Neurological:      General: No focal deficit present.      Mental Status: She is alert and oriented to person, place, and time.   Psychiatric:         Mood and Affect: Mood normal.         Behavior: Behavior normal.          DIANA [facility]

## 2024-01-18 LAB
ATRIAL RATE: 79 BPM
P AXIS: 77 DEGREES
PR INTERVAL: 140 MS
QRS AXIS: 73 DEGREES
QRSD INTERVAL: 78 MS
QT INTERVAL: 364 MS
QTC INTERVAL: 417 MS
T WAVE AXIS: 72 DEGREES
VENTRICULAR RATE: 79 BPM

## 2024-01-26 RX ORDER — ACETAMINOPHEN 325 MG/1
650 TABLET ORAL EVERY 6 HOURS PRN
OUTPATIENT
Start: 2024-01-26

## 2024-01-26 RX ORDER — CEFAZOLIN SODIUM 2 G/50ML
2000 SOLUTION INTRAVENOUS ONCE
OUTPATIENT
Start: 2024-01-26 | End: 2024-01-26

## 2024-01-26 RX ORDER — TRAMADOL HYDROCHLORIDE 50 MG/1
50 TABLET ORAL EVERY 6 HOURS PRN
OUTPATIENT
Start: 2024-01-26

## 2024-02-21 NOTE — PRE-PROCEDURE INSTRUCTIONS
Pre-Surgery Instructions:   Medication Instructions    hydrochlorothiazide (HYDRODIURIL) 25 mg tablet Uses PRN- DO NOT take day of surgery    ondansetron (ZOFRAN-ODT) 4 mg disintegrating tablet Uses PRN- OK to take day of surgery   Medication instructions for day surgery reviewed. Please use only a sip of water to take your instructed medications. Avoid all over the counter vitamins, supplements and NSAIDS for one week prior to surgery per anesthesia guidelines. Tylenol is ok to take as needed.     You will receive a call one business day prior to surgery with an arrival time and hospital directions. If your surgery is scheduled on a Monday, the hospital will be calling you on the Friday prior to your surgery. If you have not heard from anyone by 8pm, please call the hospital supervisor through the hospital  at [phone]. ([place] [phone] or [place] [phone]).    Do not eat or drink anything after midnight the night before your surgery, including candy, mints, lifesavers, or chewing gum. Do not drink alcohol 24hrs before your surgery. Try not to smoke at least 24hrs before your surgery.       Follow the pre surgery showering instructions as listed in the “My Surgical Experience Booklet” or otherwise provided by your surgeon's office. Do not use a blade to shave the surgical area 1 week before surgery. It is okay to use a clean electric clippers up to 24 hours before surgery. Do not apply any lotions, creams, including makeup, cologne, deodorant, or perfumes after showering on the day of your surgery. Do not use dry shampoo, hair spray, hair gel, or any type of hair products.     No contact lenses, eye make-up, or artificial eyelashes. Remove nail polish, including gel polish, and any artificial, gel, or acrylic nails if possible. Remove all jewelry including rings and body piercing jewelry.     Wear causal clothing that is easy to take on and off. Consider your type of surgery.    Keep any  valuables, jewelry, piercings at home. Please bring any specially ordered equipment (sling, braces) if indicated.    Arrange for a responsible person to drive you to and from the hospital on the day of your surgery. Please confirm the visitor policy for the day of your procedure when you receive your phone call with an arrival time.     Call the surgeon's office with any new illnesses, exposures, or additional questions prior to surgery.    Please reference your “My Surgical Experience Booklet” for additional information to prepare for your upcoming surgery.

## 2024-02-27 ENCOUNTER — ANESTHESIA EVENT (OUTPATIENT)
Age: 34
End: 2024-02-27
Payer: COMMERCIAL

## 2024-02-27 NOTE — DISCHARGE INSTR - AVS FIRST PAGE
POSTOPERATIVE INSTRUCTIONS following KNEE SURGERY    MEDICATIONS:  Resume all home medications unless otherwise instructed by your surgeon.  Pain Medication:  Oxycodone 5 mg, 1 tablet every 4 hours as needed  If you were given a regional anesthetic (nerve block), please begin taking the pain medication as soon as you get home, even if you have minimal or no pain.  DO NOT WAIT FOR THE NERVE BLOCK TO WEAR OFF.  Possible side effects include nausea, constipation, and urinary retention.  If you experience these side effects, please call our office for assistance.  Pain med refills are authorized only during office hours (8am-4pm, Mon-Fri).  Anti-Inflammatory:  Ibuprofen 600 mg, 1 tablet every 8 hours for 4 weeks and Tylenol 325 mg, 1-2 tablets every 6 hours for 4 weeks  Take with food.  Stop if you experience nausea, reflux, or stomach pain.  Nausea Medication:  None  Fill prescription ONLY if you expericnce severe nausea.  Blood Clot Prevention:  Aspirin 325 mg, 1 tablet twice daily for 6 weeks  Pump your foot up and down 20 times per hour while you are less mobile.    WOUND CARE:  Keep the dressing clean and dry.  Light drainage may occur the first 2 days postop.  You may remove the dressings and get the incision wet in the shower 72 hours after surgery.  DO NOT remove steri-strips or sutures.  DO NOT immerse the incision under water.  Carefully pat the incision dry.  If there is wound drainage, re-apply a fresh dry gauze dressing.  Please call our office ([phone]) if you experience either of the following:  Sudden increase in swelling, redness, or warmth at the surgical site  Excessive incisional drainage that persists beyond the 3rd day after surgery  Oral temperature greater than 101 degrees, not relieved with Tylenol  Shortness of breath, chest pain, nausea, or any other concerning symptoms    SWELLING CONTROL:  Cold Therapy:  The cold therapy device may be used either continuously or only as needed,  "according to your preference.  Do not let the pad directly touch your skin.  Alternatively, apply ice (20 min on, 20 min off) as often as you feel is necessary.  Elevation:  Elevate the entire leg above heart level.  Place pillows under your ankle to keep your knee straight.  Compression:  Apply ACE wraps or a thigh-length compression stocking as needed.    RANGE OF MOTION:  You are allowed LIMITED RANGE OF MOTION from 0 degrees (full extension) to 20 degrees of flexion.    IMMOBILIZATION:  Your knee brace should be WORN AT ALL TIMES, including sleep.  Keep the brace LOCKED FOR WALKING.  You may unlock the brace while sitting or sleeping.  You may remove the brace only for showering.    ACTIVITY:   DO NOT BEAR WEIGHT on the operative leg.  Always use crutches.  You may rest your foot on the ground for balance only.  Using Crutches on Stairs:  Going up, lead with your \"good\" (nonoperative) leg.  Going down, lead with your \"bad\" (operative) leg.  Use a hand rail when available.  Knee Extension:  Place a rolled towel or pillow under your ankle for 20-30 minutes 3-5 times per day.  This will help to maintain full knee extension.  Quad Sets:  Sit or lie with your knee straight.  Tighten your quadriceps (front thigh) muscle.  Hold for 3 seconds, then relax.  Repeat 20 times per hour while awake.    Exercise:  Begin to use your CPM (continuous motion machine). You will need to use this machine 2-3 times a day in 1-3 hour increments. The machine will be set to 20 degrees of flexion. After the first week you will increase the degrees of flexion on the machine by 10 degrees a day until you reach the limitations listed directly below. Be sure to increase the degrees on your brace as well. Please leave the brace on will using your CPM for protection.  Week 0-1     0-20 degrees,   Week 2-3     Progress to 60 degrees  Week 4-6     Progress to 125 degrees  Week 7-12   Progress to full motion     PHYSICAL THERAPY:  Begin therapy 3 " TO 5 DAYS AFTER SURGERY.  You were given a prescription for therapy at your preoperative office visit.  If you do not have physical therapy scheduled yet, please call our office for assistance.    FOLLOW-UP APPOINTMENT:  7-10 days after surgery with:    Dr. Johnathon Clark D.O.  [facility]  [address] [place] [address], [place] [address], [place] [address]  [phone] ([place] Office)  [phone] (After-Hours)

## 2024-02-29 ENCOUNTER — HOSPITAL ENCOUNTER (OUTPATIENT)
Age: 34
Setting detail: OUTPATIENT SURGERY
Discharge: HOME | End: 2024-02-29
Attending: ORTHOPAEDIC SURGERY | Admitting: ORTHOPAEDIC SURGERY
Payer: COMMERCIAL

## 2024-02-29 ENCOUNTER — TELEPHONE (OUTPATIENT)
Dept: OBGYN CLINIC | Facility: MEDICAL CENTER | Age: 34
End: 2024-02-29

## 2024-02-29 ENCOUNTER — ANESTHESIA (OUTPATIENT)
Age: 34
End: 2024-02-29
Payer: COMMERCIAL

## 2024-02-29 VITALS
WEIGHT: 160.2 LBS | OXYGEN SATURATION: 98 % | SYSTOLIC BLOOD PRESSURE: 104 MMHG | RESPIRATION RATE: 17 BRPM | TEMPERATURE: 98.5 F | HEIGHT: 71 IN | HEART RATE: 81 BPM | BODY MASS INDEX: 22.43 KG/M2 | DIASTOLIC BLOOD PRESSURE: 63 MMHG

## 2024-02-29 DIAGNOSIS — R60.0 BILATERAL LEG EDEMA: ICD-10-CM

## 2024-02-29 DIAGNOSIS — M25.361 PATELLAR INSTABILITY OF RIGHT KNEE: ICD-10-CM

## 2024-02-29 DIAGNOSIS — M22.01 RECURRENT DISLOCATION OF RIGHT PATELLA: ICD-10-CM

## 2024-02-29 DIAGNOSIS — S83.31XD TEAR OF ARTICULAR CARTILAGE OF KNEE AS CURRENT INJURY, RIGHT, SUBSEQUENT ENCOUNTER: Primary | ICD-10-CM

## 2024-02-29 LAB
EXT PREGNANCY TEST URINE: NEGATIVE
EXT. CONTROL: NORMAL

## 2024-02-29 PROCEDURE — 27427 RECONSTRUCTION KNEE: CPT | Performed by: ORTHOPAEDIC SURGERY

## 2024-02-29 PROCEDURE — C1713 ANCHOR/SCREW BN/BN,TIS/BN: HCPCS | Performed by: ORTHOPAEDIC SURGERY

## 2024-02-29 PROCEDURE — 27427 RECONSTRUCTION KNEE: CPT

## 2024-02-29 PROCEDURE — 27412 AUTOCHONDROCYTE IMPLANT KNEE: CPT | Performed by: ORTHOPAEDIC SURGERY

## 2024-02-29 PROCEDURE — C9290 INJ, BUPIVACAINE LIPOSOME: HCPCS | Performed by: ANESTHESIOLOGY

## 2024-02-29 PROCEDURE — 81025 URINE PREGNANCY TEST: CPT | Performed by: ORTHOPAEDIC SURGERY

## 2024-02-29 PROCEDURE — 27412 AUTOCHONDROCYTE IMPLANT KNEE: CPT

## 2024-02-29 DEVICE — MACI FINAL PRODUCT ASSEMBLED 1 MEMBRANE: Type: IMPLANTABLE DEVICE | Site: PATELLA | Status: FUNCTIONAL

## 2024-02-29 DEVICE — TENDON GRAFT GRACILIS: Type: IMPLANTABLE DEVICE | Site: PATELLA | Status: FUNCTIONAL

## 2024-02-29 DEVICE — SUTR ANCH,BIO-COMP S-TAK
Type: IMPLANTABLE DEVICE | Site: PATELLA | Status: FUNCTIONAL
Brand: ARTHREX®

## 2024-02-29 DEVICE — MICROFIX QUICKANCHOR PLUS (4/0 SUTURE) POLY (L-LACTIDE) ABSORBABLE ANCHOR, SIZE 4/0 (1.5 METRIC) ORTHOCORD BRAIDED COMPOSITE SUTURE, 18 INCHES (45CM) WITH J-1 REVERSE CUTTING NEEDLES AND A 1.3 X 5.0MM DRILL BIT
Type: IMPLANTABLE DEVICE | Site: PATELLA | Status: FUNCTIONAL
Brand: MICROFIX QUICKANCHOR ORTHOCORD

## 2024-02-29 DEVICE — Ø7X 20MM BC IF SCRW, VENTED
Type: IMPLANTABLE DEVICE | Site: PATELLA | Status: FUNCTIONAL
Brand: ARTHREX®

## 2024-02-29 RX ORDER — LIDOCAINE HYDROCHLORIDE 20 MG/ML
INJECTION, SOLUTION EPIDURAL; INFILTRATION; INTRACAUDAL; PERINEURAL AS NEEDED
Status: DISCONTINUED | OUTPATIENT
Start: 2024-02-29 | End: 2024-02-29

## 2024-02-29 RX ORDER — TRAMADOL HYDROCHLORIDE 50 MG/1
50 TABLET ORAL EVERY 6 HOURS PRN
Status: DISCONTINUED | OUTPATIENT
Start: 2024-02-29 | End: 2024-02-29 | Stop reason: HOSPADM

## 2024-02-29 RX ORDER — CEFAZOLIN SODIUM 2 G/50ML
2000 SOLUTION INTRAVENOUS ONCE
Status: COMPLETED | OUTPATIENT
Start: 2024-02-29 | End: 2024-02-29

## 2024-02-29 RX ORDER — OXYCODONE HYDROCHLORIDE 5 MG/1
5 TABLET ORAL EVERY 4 HOURS PRN
Qty: 15 TABLET | Refills: 0 | Status: SHIPPED | OUTPATIENT
Start: 2024-02-29

## 2024-02-29 RX ORDER — SODIUM CHLORIDE, SODIUM LACTATE, POTASSIUM CHLORIDE, CALCIUM CHLORIDE 600; 310; 30; 20 MG/100ML; MG/100ML; MG/100ML; MG/100ML
125 INJECTION, SOLUTION INTRAVENOUS CONTINUOUS
Status: DISCONTINUED | OUTPATIENT
Start: 2024-02-29 | End: 2024-02-29 | Stop reason: HOSPADM

## 2024-02-29 RX ORDER — MIDAZOLAM HYDROCHLORIDE 2 MG/2ML
INJECTION, SOLUTION INTRAMUSCULAR; INTRAVENOUS
Status: COMPLETED | OUTPATIENT
Start: 2024-02-29 | End: 2024-02-29

## 2024-02-29 RX ORDER — ONDANSETRON 4 MG/1
4 TABLET, FILM COATED ORAL EVERY 8 HOURS PRN
Qty: 20 TABLET | Refills: 0 | Status: SHIPPED | OUTPATIENT
Start: 2024-02-29

## 2024-02-29 RX ORDER — ACETAMINOPHEN 325 MG/1
650 TABLET ORAL EVERY 6 HOURS PRN
Status: DISCONTINUED | OUTPATIENT
Start: 2024-02-29 | End: 2024-02-29 | Stop reason: HOSPADM

## 2024-02-29 RX ORDER — HYDROMORPHONE HCL/PF 1 MG/ML
0.5 SYRINGE (ML) INJECTION
Status: DISCONTINUED | OUTPATIENT
Start: 2024-02-29 | End: 2024-02-29 | Stop reason: HOSPADM

## 2024-02-29 RX ORDER — DIPHENHYDRAMINE HYDROCHLORIDE 50 MG/ML
12.5 INJECTION INTRAMUSCULAR; INTRAVENOUS ONCE AS NEEDED
Status: DISCONTINUED | OUTPATIENT
Start: 2024-02-29 | End: 2024-02-29 | Stop reason: HOSPADM

## 2024-02-29 RX ORDER — GLYCOPYRROLATE 0.2 MG/ML
INJECTION INTRAMUSCULAR; INTRAVENOUS AS NEEDED
Status: DISCONTINUED | OUTPATIENT
Start: 2024-02-29 | End: 2024-02-29

## 2024-02-29 RX ORDER — PROPOFOL 10 MG/ML
INJECTION, EMULSION INTRAVENOUS AS NEEDED
Status: DISCONTINUED | OUTPATIENT
Start: 2024-02-29 | End: 2024-02-29

## 2024-02-29 RX ORDER — ONDANSETRON 2 MG/ML
INJECTION INTRAMUSCULAR; INTRAVENOUS AS NEEDED
Status: DISCONTINUED | OUTPATIENT
Start: 2024-02-29 | End: 2024-02-29

## 2024-02-29 RX ORDER — FENTANYL CITRATE/PF 50 MCG/ML
50 SYRINGE (ML) INJECTION
Status: DISCONTINUED | OUTPATIENT
Start: 2024-02-29 | End: 2024-02-29 | Stop reason: HOSPADM

## 2024-02-29 RX ORDER — BUPIVACAINE HYDROCHLORIDE 5 MG/ML
INJECTION, SOLUTION EPIDURAL; INTRACAUDAL
Status: COMPLETED | OUTPATIENT
Start: 2024-02-29 | End: 2024-02-29

## 2024-02-29 RX ORDER — ROCURONIUM BROMIDE 10 MG/ML
INJECTION, SOLUTION INTRAVENOUS AS NEEDED
Status: DISCONTINUED | OUTPATIENT
Start: 2024-02-29 | End: 2024-02-29

## 2024-02-29 RX ORDER — NEOSTIGMINE METHYLSULFATE 1 MG/ML
INJECTION INTRAVENOUS AS NEEDED
Status: DISCONTINUED | OUTPATIENT
Start: 2024-02-29 | End: 2024-02-29

## 2024-02-29 RX ORDER — CEFAZOLIN SODIUM 2 G/50ML
SOLUTION INTRAVENOUS AS NEEDED
Status: DISCONTINUED | OUTPATIENT
Start: 2024-02-29 | End: 2024-02-29

## 2024-02-29 RX ORDER — HYDROMORPHONE HCL IN WATER/PF 6 MG/30 ML
0.2 PATIENT CONTROLLED ANALGESIA SYRINGE INTRAVENOUS
Status: DISCONTINUED | OUTPATIENT
Start: 2024-02-29 | End: 2024-02-29 | Stop reason: HOSPADM

## 2024-02-29 RX ORDER — METOCLOPRAMIDE HYDROCHLORIDE 5 MG/ML
10 INJECTION INTRAMUSCULAR; INTRAVENOUS ONCE AS NEEDED
Status: DISCONTINUED | OUTPATIENT
Start: 2024-02-29 | End: 2024-02-29 | Stop reason: HOSPADM

## 2024-02-29 RX ORDER — MAGNESIUM HYDROXIDE 1200 MG/15ML
LIQUID ORAL AS NEEDED
Status: DISCONTINUED | OUTPATIENT
Start: 2024-02-29 | End: 2024-02-29 | Stop reason: HOSPADM

## 2024-02-29 RX ORDER — LIDOCAINE HYDROCHLORIDE 10 MG/ML
0.5 INJECTION, SOLUTION EPIDURAL; INFILTRATION; INTRACAUDAL; PERINEURAL ONCE AS NEEDED
Status: DISCONTINUED | OUTPATIENT
Start: 2024-02-29 | End: 2024-02-29 | Stop reason: HOSPADM

## 2024-02-29 RX ORDER — DEXAMETHASONE SODIUM PHOSPHATE 10 MG/ML
INJECTION, SOLUTION INTRAMUSCULAR; INTRAVENOUS AS NEEDED
Status: DISCONTINUED | OUTPATIENT
Start: 2024-02-29 | End: 2024-02-29

## 2024-02-29 RX ORDER — FENTANYL CITRATE 50 UG/ML
INJECTION, SOLUTION INTRAMUSCULAR; INTRAVENOUS AS NEEDED
Status: DISCONTINUED | OUTPATIENT
Start: 2024-02-29 | End: 2024-02-29

## 2024-02-29 RX ORDER — ONDANSETRON 2 MG/ML
4 INJECTION INTRAMUSCULAR; INTRAVENOUS ONCE AS NEEDED
Status: DISCONTINUED | OUTPATIENT
Start: 2024-02-29 | End: 2024-02-29 | Stop reason: HOSPADM

## 2024-02-29 RX ADMIN — LIDOCAINE HYDROCHLORIDE 100 MG: 20 INJECTION, SOLUTION EPIDURAL; INFILTRATION; INTRACAUDAL; PERINEURAL at 08:56

## 2024-02-29 RX ADMIN — FENTANYL CITRATE 100 MCG: 50 INJECTION INTRAMUSCULAR; INTRAVENOUS at 08:56

## 2024-02-29 RX ADMIN — CEFAZOLIN SODIUM 2000 MG: 2 SOLUTION INTRAVENOUS at 09:04

## 2024-02-29 RX ADMIN — DEXAMETHASONE SODIUM PHOSPHATE 10 MG: 10 INJECTION, SOLUTION INTRAMUSCULAR; INTRAVENOUS at 08:57

## 2024-02-29 RX ADMIN — SODIUM CHLORIDE, SODIUM LACTATE, POTASSIUM CHLORIDE, AND CALCIUM CHLORIDE: .6; .31; .03; .02 INJECTION, SOLUTION INTRAVENOUS at 09:46

## 2024-02-29 RX ADMIN — ROCURONIUM BROMIDE 50 MG: 10 INJECTION, SOLUTION INTRAVENOUS at 08:56

## 2024-02-29 RX ADMIN — BUPIVACAINE 20 ML: 13.3 INJECTION, SUSPENSION, LIPOSOMAL INFILTRATION at 08:05

## 2024-02-29 RX ADMIN — GLYCOPYRROLATE 0.4 MG: 0.2 INJECTION, SOLUTION INTRAMUSCULAR; INTRAVENOUS at 11:27

## 2024-02-29 RX ADMIN — NEOSTIGMINE METHYLSULFATE 3 MG: 1 INJECTION INTRAVENOUS at 11:27

## 2024-02-29 RX ADMIN — SODIUM CHLORIDE, SODIUM LACTATE, POTASSIUM CHLORIDE, AND CALCIUM CHLORIDE 125 ML/HR: .6; .31; .03; .02 INJECTION, SOLUTION INTRAVENOUS at 07:20

## 2024-02-29 RX ADMIN — ONDANSETRON 4 MG: 2 INJECTION INTRAMUSCULAR; INTRAVENOUS at 08:57

## 2024-02-29 RX ADMIN — PROPOFOL 150 MG: 10 INJECTION, EMULSION INTRAVENOUS at 08:56

## 2024-02-29 RX ADMIN — ONDANSETRON 4 MG: 2 INJECTION INTRAMUSCULAR; INTRAVENOUS at 11:38

## 2024-02-29 RX ADMIN — BUPIVACAINE HYDROCHLORIDE 10 ML: 5 INJECTION, SOLUTION EPIDURAL; INTRACAUDAL; PERINEURAL at 08:05

## 2024-02-29 RX ADMIN — MIDAZOLAM 2 MG: 1 INJECTION INTRAMUSCULAR; INTRAVENOUS at 08:03

## 2024-02-29 RX ADMIN — CEFAZOLIN SODIUM 2000 MG: 2 SOLUTION INTRAVENOUS at 07:20

## 2024-02-29 NOTE — ANESTHESIA PROCEDURE NOTES
Peripheral Block    Patient location during procedure: holding area  Start time: 2/29/2024 8:05 AM  Reason for block: at surgeon's request and post-op pain management  Staffing  Performed by: Julio Bryson MD  Authorized by: Julio Bryson MD    Preanesthetic Checklist  Completed: patient identified, IV checked, site marked, risks and benefits discussed, surgical consent, monitors and equipment checked, pre-op evaluation and timeout performed  Peripheral Block  Patient position: supine  Prep: ChloraPrep  Patient monitoring: frequent blood pressure checks, continuous pulse oximetry and heart rate  Block type: Femoral  Laterality: right  Injection technique: single-shot  Procedures: ultrasound guided, Ultrasound guidance required for the procedure to increase accuracy and safety of medication placement and decrease risk of complications.  Ultrasound permanent image savedbupivacaine (PF) (MARCAINE) 0.5 % injection 20 mL - Perineural   10 mL - 2/29/2024 8:05:00 AM  bupivacaine liposomal (EXPAREL) 1.3 % injection 20 mL - Perineural   20 mL - 2/29/2024 8:05:00 AM  midazolam (VERSED) injection 0.5 mg - Intravenous   2 mg - 2/29/2024 8:03:00 AM  Needle  Needle type: Stimuplex   Needle localization: anatomical landmarks and ultrasound guidance  Assessment  Injection assessment: incremental injection, frequent aspiration, injected with ease, negative aspiration, negative for heart rate change, no paresthesia on injection, no symptoms of intraneural/intravenous injection and needle tip visualized at all times  Paresthesia pain: none  Post-procedure:  site cleaned  patient tolerated the procedure well with no immediate complications

## 2024-02-29 NOTE — ANESTHESIA POSTPROCEDURE EVALUATION
Post-Op Assessment Note    CV Status:  Stable  Pain Score: 0    Pain management: adequate       Mental Status:  Awake   Hydration Status:  Euvolemic   PONV Controlled:  Controlled   Airway Patency:  Patent     Post Op Vitals Reviewed: Yes    No anethesia notable event occurred.    Staff: Anesthesiologist, CRNA               BP   112/68   Temp   98.3   Pulse  76   Resp   12   SpO2   97%

## 2024-02-29 NOTE — OP NOTE
OPERATIVE REPORT  PATIENT NAME: Cinthia Ortiz    :  1990  MRN: [id number]  Pt Location: [facility] OR ROOM 05    SURGERY DATE: 2024    Surgeons and Role:     * Johnathon Clark DO - Primary     * Vee Mckeon PA-C - Assisting    Preop Diagnosis:  Recurrent dislocation of right patella [M22.01]  Tear of articular cartilage of knee as current injury, left, sequela [S83.32XS]  Patellar instability of right knee [M25.361]    Post-Op Diagnosis Codes:     * Recurrent dislocation of right patella [M22.01]     * Tear of articular cartilage of knee as current injury, left, sequela [S83.32XS]     * Patellar instability of right knee [M25.361]    Procedure(s):  Right - OATS PROCEDURE- THIS is a NGA- have NGA implant kit  Right - REALIGNMENT PATELLA    Specimen(s):  * No specimens in log *    Estimated Blood Loss:   Minimal    Drains:  * No LDAs found *    Anesthesia Type:   General    Operative Indications:  Recurrent dislocation of right patella [M22.01]  Tear of articular cartilage of knee as current injury, left, sequela [S83.32XS]  Patellar instability of right knee [M25.361]      Complications:   None    Procedure and Technique:      1.   open autologous chondrocyte implantation right   knee      2.right   knee MPFL reconstruction with gracilis allograft         ANESTHESIA:  General      ESTIMATED BLOOD LOSS:  minimal.     COMPLICATIONS:  None.     DRAINS:  None.     TOURNIQUET:  Known     OPERATIVE INDICATIONS:     Based on MRI and sxs referable to large cartilage defect of medial pole of patella as well as recurrent patella instability and maltracking, and having failed nonop measures, we recommended surgical intervention. We discussed the risks and benefits of surgery along with the probabilities of each with the patient. The risks include but are not limited to: infection (superficial or deep), bleeding, nerve damage (direct or indirect), post operative stiffness, blood clots, pulmonary embolus, death from  anesthesia.      They elect to proceed with open autologous chondrocyte implantation of patella and lateral femoral condyle as well as MPFL reconstruction using hamstring allograft     FINDINGS:  Examination under anesthesia revealed a range of motion in right   knee from about 0 to 135 degrees of flexion. The right  knee had 0 degrees of hyperextension to 135 degrees of flexion. In right   knee, there was a Negative lachman and negative pivot shift. The knee was stable to valgus and varus stress. Negative posterior drawer. Negative posterolateral drawer.  There was significant right patellar instability with translation of 3/4 distance of the patellar width.        DESCRIPTION OF PROCEDURE:           Informed consent and initialing of the right  knee in preoperative holding. General with LMA  anesthesia. Patient supine. Sterile prep and drape of the right  lower extremity. Antibiotics given within 1 hour of skin incision. Time-out prior to the skin incision.         Findings:      Open evaluation of the knee revealed the following:      The patella cartilage defect measured 3 cm medial to lateral x 2 cm superior to inferior.  The defect was fully contained superiorly but uncontained medially laterally and inferiorly.     Procedure: In the pre-operative holding area, the patient identified the correct operative extremity and I marked that extremity with my initials, using a permanent marker. The patient was then brought to the operating room and positioned supine. Following satisfactory induction of anesthesia, the  knee was prepped and draped in the usual sterile fashion  . Before any surgical instrumentation was passed to me by the surgical technician, a formalized time-out occurred, which involves the surgeon, circulating nurse, and anesthesia staff all verifying the correct operative extremity. My initials were visible on the prepped and draped operative field.      The incision was planned to extend from about  5 cm proximal to the patella to 3 cm distal to the joint line, in line with his prior medial portal incision. This incision was established with a scalpel. The subcutaneous tissue was divided in line with the skin incision and hemostasis was achieved using the electrocautery tool. Dissection was then continued to develop a flap superficial to the deep fascia medially and laterally. The lateral and medial perforators were identified and preserved when possible. An arthrotomy was established immediately medial to the patella and patellar tendon. Great care was taken to avoid injury to the articular cartilage and menisci. The patella was gently subluxed laterally in order to provide adequate exposure of the femoral condyle.     The focal articular cartilage defect of the patella was prepared for autologous chondrocyte implantation by excising all unhealthy cartilage from the perimeter of the defect, using a scalpel, sharp elevator, and curettes.  The calcified layer was removed with ring curettes.         At this time, the point at the junction between the proximal one third and the middle one third of the patella was identified. This was to be the patellar origin of the MPFL graft.  3cm incision made -- interval between layer 2 and 3 located. A small rongeur and a handheld bur was used to make a small trough approximately 3 mm in width and 2 cm long at the planned location of the patellar fixation of our MPFL graft. 2×3.0mm Arthrex suturetak anchors were placed into this spot approximately 1-1/2 cm apart.  These sutures were then held in place for future MPFL        After defining the cartilage defect, all cartilage was removed.  Any bleeding of the subchondral bone was minimized by applying Gelfoam soaked in thrombin. A piece of sterile glove paper was placed over the cartilage defect and traced with a fine marking pen. This template was cut out and placed over the porcine derived bilayer collagen membrane. The  membrane was then cut to the size of the template using scissors. The membrane was carefully oriented properly with respect to both rotation and side up.     This autologous chondrocyte membrane was fixed to the articular margin of the cartilage defect with 6-0 Vicryl suture, using a simple technique and spacing sutures approximately 6 mm apart to achieve a water tight seal.  6 POPAPPuy micro fix anchors were placed with 6-0 Vicryl stitches threaded into them all along the medial lateral and inferior borders of the patella where the defect was uncontained.  Fibrin glue was applied the knee to further ensure a watertight seal.  All the remaining fluid was aspirated from under the membrane The superior opening and peripheral rim was closed with an additional fibrin glue and held for 3 minutes.    This completed the Delbert or matrix autologous chondrocyte implantation of the patella and.  After 2 towel clips were used to confirm appropriate patellar tracking, cycling of the knee was performed though ROM and there is no loosening of the delbert graft or disruption.         A gracilis allograft 260 mm in length was doubled and the looped end was secured to the medial patella on the patella using 1 limb of each anchor through the looped end of the graft, then tied together. The graft was drawn down into the trough made into the patella by pulling on the remaining limbs, passing those remaining limbs through the graft, and tying the remaining 2 limbs together on top resulting in 2 knots securing the gracilis graft. This provided a very secure fixation of the looped end of the gracilis allograft on the patella. The gracilis allograft was wrapped in saline and protected with a towel while attention was turned to the femoral side of the MPFL reconstruction.     At this time an incision based on the medial epicondyle of the femur. A 3 cm incision was made over this area and Blunt dissection was taken down to the abductor tubercle and  the medial epicondyle. The small valley intervening these 2 structures was identified. A Beath pin was placed provisionally in the spot and was verified fluoroscopically as being an adequate placement for the femoral attachment of the MPFL graft that we were to place. Once fluoroscopically confirmed, the Beath pin was inserted into the medial femoral condyle and out the lateral cortex and lateral skin at the anterior one third of the iliotibial band. Blunt retraction was used throughout this procedure to protect all surrounding structures.      A blunt tonsil was used to deliver a suture shuttle from the patella down to this location. A loop was placed on the patella side and the 2 free ends of the gracilis allograft were delivered in the interval between layer 2 and 3 down into our medial thigh incision. Care was taken to tension the graft down to the pin while fluoroscopy was used to provide a perfect AP of the knee in 60° of flexion. This was done to ensure that we had the appropriate length going into the planned tunnel, that the patella was centrally tracking, and that we were not going to over constrain the patella. In addition, the free ends of the graft were looped around the Beath pin and the knee was taken through a full range of motion to ensure optimal graft isometry prior to reaming. Once the appropriate length was decided, the gracilis allograft free ends were whipstitched together from that point meeting the aperture of the tunnel to approximately 25mm past this point. Approximately 2 cm of extra tendon was removed.      A 7 mm reamer was placed over this pin, and this was taken approximately 5 cm into the medial femoral condyle. The ends of the allograft were placed into the Beath pin eyelet and brought out laterally. Again, tension was carefully applied to ensure central tracking of the patella without overconstraining the patella. At this juncture, a 7 x 23 mm Arthrex Bio-Tenodesis screw was  brought to the field and an attempt was made to place the interference screw, but the screw would not hold in the bone.  For this reason a metal button was used to tie the sutures over the button on the lateral femoral cortex.  This secured the MPFL graft.    A lateral release was done open with a bovie to release the tight lateral capsular tissue.     The knee was taken through a full range of motion, and there was a good endpoint with lateral patellar displacement.  The patella was tracking centrally throughout the range.     The wound was copiously irrigated using sterile saline. Meticulous hemostasis was obtained.  The arthrotomy was closed with interrupted figure-of-eight 0 Vicryl stitches and they were simple side-to-side stitches at the level of the patellar tendon.. This was followed by 2-0 vicryl interrupted for superficial subcutaneous tissue and 3-0 monocryl in a buried fashion             I was present for the entire procedure., A qualified resident physician was not available., and A physician assistant was required during the procedure for retraction, tissue handling, dissection and suturing.    Patient Disposition:  PACU         SIGNATURE: Johnathon Clark DO  DATE: February 29, 2024  TIME: 11:52 AM

## 2024-02-29 NOTE — TELEPHONE ENCOUNTER
Spoke with Pt to change appt time with Caity Parks as her scheduled changed 3/6. Appt is now scheduled for 930am 3/6.

## 2024-02-29 NOTE — ANESTHESIA PREPROCEDURE EVALUATION
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      Anesthesia Plan  ASA Score- 2     Anesthesia Type- general with ASA Monitors.         Additional Monitors:     Airway Plan: ETT.    Comment: Right femoral block for postoperative pain control.       Plan Factors-Exercise tolerance (METS): >4 METS.    Chart reviewed. EKG reviewed. Imaging results reviewed. Existing labs reviewed. Patient summary reviewed.                  Induction- intravenous.    Postoperative Plan- Plan for postoperative opioid use.     Informed Consent- Anesthetic plan and risks discussed with patient.  I personally reviewed this patient with the CRNA. Discussed and agreed on the Anesthesia Plan with the CRNA..              Recent labs personally reviewed:  Lab Results   Component Value Date    WBC 7.24 01/16/2024    HGB 14.4 01/16/2024     01/16/2024     Lab Results   Component Value Date    K 3.7 01/16/2024    BUN 17 01/16/2024    CREATININE 0.74 01/16/2024     No results found  "for: \"PTT\"   No results found for: \"INR\"    Blood type O    Lab Results   Component Value Date    HGBA1C 5.1 04/28/2023       I, Julio Bryson MD, have personally seen and evaluated the patient prior to anesthetic care.  I have reviewed the pre-anesthetic record, and other medical records if appropriate to the anesthetic care.  If a CRNA is involved in the case, I have reviewed the CRNA assessment, if present, and agree. Risks/benefits and alternatives discussed with patient including possible PONV, sore throat, and possibility of rare anesthetic and surgical emergencies.      "

## 2024-03-01 DIAGNOSIS — S83.31XA TEAR OF ARTICULAR CARTILAGE OF KNEE AS CURRENT INJURY, RIGHT, INITIAL ENCOUNTER: Primary | ICD-10-CM

## 2024-03-01 DIAGNOSIS — M22.01 RECURRENT DISLOCATION OF RIGHT PATELLA: ICD-10-CM

## 2024-03-01 DIAGNOSIS — M25.361 PATELLAR INSTABILITY OF RIGHT KNEE: ICD-10-CM

## 2024-03-01 LAB
DME PARACHUTE DELIVERY DATE ACTUAL: NORMAL
DME PARACHUTE DELIVERY DATE EXPECTED: NORMAL
DME PARACHUTE DELIVERY DATE REQUESTED: NORMAL
DME PARACHUTE ITEM DESCRIPTION: NORMAL
DME PARACHUTE ORDER STATUS: NORMAL
DME PARACHUTE SUPPLIER NAME: NORMAL
DME PARACHUTE SUPPLIER PHONE: NORMAL

## 2024-03-04 ENCOUNTER — EVALUATION (OUTPATIENT)
Dept: PHYSICAL THERAPY | Facility: MEDICAL CENTER | Age: 34
End: 2024-03-04
Payer: COMMERCIAL

## 2024-03-04 DIAGNOSIS — M22.01 RECURRENT DISLOCATION OF RIGHT PATELLA: ICD-10-CM

## 2024-03-04 DIAGNOSIS — S83.31XD TEAR OF ARTICULAR CARTILAGE OF KNEE AS CURRENT INJURY, RIGHT, SUBSEQUENT ENCOUNTER: ICD-10-CM

## 2024-03-04 DIAGNOSIS — M25.361 PATELLAR INSTABILITY OF RIGHT KNEE: ICD-10-CM

## 2024-03-04 PROCEDURE — 97161 PT EVAL LOW COMPLEX 20 MIN: CPT | Performed by: PHYSICAL THERAPIST

## 2024-03-04 PROCEDURE — 97110 THERAPEUTIC EXERCISES: CPT | Performed by: PHYSICAL THERAPIST

## 2024-03-04 NOTE — PROGRESS NOTES
PT Evaluation     Today's date: 3/4/2024  Patient name: Cinthia Ortiz  : 1990  MRN: [id number]  Referring provider: Caity Parks,*  Dx:   Encounter Diagnosis     ICD-10-CM    1. Recurrent dislocation of right patella  M22.01 Ambulatory Referral to Physical Therapy      2. Tear of articular cartilage of knee as current injury, right, subsequent encounter  S83.31XD Ambulatory Referral to Physical Therapy      3. Patellar instability of right knee  M25.361 Ambulatory Referral to Physical Therapy                     Assessment  Assessment details: Pt is a pleasant 32 yo female presenting to physical therapy s/p MPFL reconstruction with an OATS procedure.  Pt would benefit from skilled PT to address current impairments and return pt to pre-morbid function.    Understanding of Dx/Px/POC: good   Prognosis: good    Goals  Impairment Goals  - Pt I with initial HEP in 1-2 visits  - Improve ROM equal to contralateral side in 6+ weeks  - Increase strength to 5/5 in all affected areas in 8+ weeks    Functional Goals  - Increase FOTO to at least 61 in 16 weeks  - Patient will be independent with comprehensive HEP in 16 weeks  - Ambulation is improved to prior level of function in 8+ weeks  - Stair climbing is improved to prior level of function in 8+ weeks  - Squatting is improved to prior level of function in 10+ weeks   - Patient will be able to return to pre-morbid activity level with min to no difficulty or discomfort in 16 weeks    Plan  Patient would benefit from: skilled physical therapy  Other planned modality interventions: Modalities prn  Planned therapy interventions: manual therapy, balance, neuromuscular re-education, patient education, strengthening, stretching, therapeutic activities, therapeutic exercise and home exercise program  Frequency: 2x week  Duration in weeks: 16  Treatment plan discussed with: patient        Subjective Evaluation    History of Present Illness  Mechanism of injury: Pt with  "chronic history of patellar dislocations and subluxations, so she decided to proceed with surgery.  Pt underwent surgery last Thursday.  She has been in the CMP regularly set at 0-20 degrees and adhering to NWB with ambulation.  She still feels like the nerve block is working, so she doesn't have any pain.    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  The wounds are closed and healing and there is no drainage or evidence of infection     Neurological Testing     Sensation     Knee   Left Knee   Intact: Light touch    Right Knee   Diminished: light touch     Comments   Right light touch: R knee.     Active Range of Motion   Left Knee   Normal active range of motion    Right Knee   Flexion: 20 degrees   Extension: 0 degrees     Strength/Myotome Testing     Left Knee   Flexion: 5  Extension: 5  Quadriceps contraction: good    Right Knee   Quadriceps contraction: poor    Additional Strength Details  R knee MMT, NT secondary to recency of surgery     General Comments:      Knee Comments  + R gastroc and HS tightness              Precautions: s/p OATS procedure and MPFL reconstruction R knee       Ther Ex 3/            Heel slides AA  x30            QS 5\"  x50            HS str 3x30\"            Gastroc str 3x30\"                                                                                                                               "                                                                                                                                                  Gait Training 3/4            Brief HK                         Modalities 3/4            MH PRN

## 2024-03-06 ENCOUNTER — OFFICE VISIT (OUTPATIENT)
Dept: OBGYN CLINIC | Facility: MEDICAL CENTER | Age: 34
End: 2024-03-06

## 2024-03-06 VITALS
HEIGHT: 71 IN | WEIGHT: 160 LBS | DIASTOLIC BLOOD PRESSURE: 77 MMHG | HEART RATE: 82 BPM | SYSTOLIC BLOOD PRESSURE: 111 MMHG | BODY MASS INDEX: 22.4 KG/M2

## 2024-03-06 DIAGNOSIS — Z98.890 S/P RIGHT KNEE SURGERY: Primary | ICD-10-CM

## 2024-03-06 PROCEDURE — 99024 POSTOP FOLLOW-UP VISIT: CPT | Performed by: PHYSICIAN ASSISTANT

## 2024-03-06 RX ORDER — PREDNISOLONE ACETATE 10 MG/ML
SUSPENSION/ DROPS OPHTHALMIC
COMMUNITY
Start: 2024-01-18

## 2024-03-06 NOTE — PROGRESS NOTES
Knee Post Operative Visit     Assesment:     33 y.o. female 1 week s/p right knee with ACI of the patella with MPFL reconstruction with allograft, DOS: 2/29/24    Plan:    Post-Operative treatment:    Ice to knee for 20 minutes at least 1-2 times daily.  PT per protocol   OTC NSAIDS prn for pain.    Imaging:    All imaging from today was reviewed by myself and explained to the patient.     ROM:   Week 0-1     0-20 degrees active and passive,   Week 2-3     Active 30-60 degrees,   Week 4-6     Active  degrees,   Week 7-12   Progress to active full motion     Weight bearing:   Week 0-1     <20%,   Week 2-3     <20- 50%,   Week 4-6     <75-full   Week 7-12   Full    Bracing:  Week 0-1     locked in extension during ambulation  Week 2-3     locked in extension during ambulation  Week 4-6    unlocked during ambulation  Week 7-12   no brace    DVT Prophylaxis:  Aspirin 81 mg oral twice daily x 6 weeks post-op and Ambulation    Follow up:   4 weeks, motion check     Patient was advised that if they have any fevers, chills, chest pain, shortness of breath, redness or drainage from the incision, please let our office know immediately.          Chief Complaint   Patient presents with    Right Knee - Post-op       History of Present Illness:    The patient is a 33 y.o. female who is being evaluated post operatively 1 week s/p right knee with ACI of the patella with MPFL reconstruction with allograft, DOS: 2/29/24.    Since the prior visit, She reports significant improvement.     Pain is well controlled.  The patient is using ice to control swelling.    They have started physical therapy.     The patient Aspirin 81 mg oral twice daily x 6 weeks and Ambulation for DVT ppx.  The patient has been ambulating with crutches.    The patient has been ambulating with a brace.    The patient denies any fevers, chills, calf pain, chest pain/shortness of breath, redness or drainage from the incision.         I have reviewed the past  "medical, surgical, social and family history, medications and allergies as documented in the EMR.    Review of systems: ROS is negative other than that noted in the HPI.  Constitutional: Negative for fatigue and fever.        Physical Exam:    Blood pressure 111/77, pulse 82, height 5' 11\" (1.803 m), weight 72.6 kg (160 lb), last menstrual period 02/01/2024, not currently breastfeeding.    General/Constitutional: NAD, well developed, well nourished  HENT: Normocephalic, atraumatic  CV: Intact distal pulses, regular rate  Resp: No respiratory distress or labored breathing  Lymphatic: No lymphadenopathy palpated  Neuro: Alert and Oriented x 3, no focal deficits  Psych: Normal mood, normal affect, normal judgement, normal behavior  Skin: Warm, dry, no rashes, no erythema      Knee Exam (focused):                  RIGHT LEFT   ROM:   0-60 -5-130   Palpation: Effusion mild negative     MJL tenderness Negative Negative     LJL tenderness Negative Negative   Instability: Varus stable stable     Valgus stable stable   Special Tests: Lachman Negative Negative     Posterior drawer Negative Negative     Anterior drawer Negative Negative     Pivot shift not tested not tested     Dial not tested not tested   Patella: Palpation medial facet ttp no tenderness     Mobility 1/4 1/4     Apprehension Negative Negative   Other: Single leg 1/4 squat not tested not tested      Incisions show no erythema, no drainage    LE NV Exam: +2 DP/PT pulses bilaterally  Sensation intact to light touch L2-S1 bilaterally     Bilateral hip ROM demonstrates no pain actively or passively    No calf tenderness to palpation bilaterally      "

## 2024-03-07 ENCOUNTER — OFFICE VISIT (OUTPATIENT)
Dept: PHYSICAL THERAPY | Facility: MEDICAL CENTER | Age: 34
End: 2024-03-07
Payer: COMMERCIAL

## 2024-03-07 DIAGNOSIS — M22.01 RECURRENT DISLOCATION OF RIGHT PATELLA: Primary | ICD-10-CM

## 2024-03-07 DIAGNOSIS — S83.31XD TEAR OF ARTICULAR CARTILAGE OF KNEE AS CURRENT INJURY, RIGHT, SUBSEQUENT ENCOUNTER: ICD-10-CM

## 2024-03-07 DIAGNOSIS — M25.361 PATELLAR INSTABILITY OF RIGHT KNEE: ICD-10-CM

## 2024-03-07 PROCEDURE — 97112 NEUROMUSCULAR REEDUCATION: CPT

## 2024-03-07 PROCEDURE — 97110 THERAPEUTIC EXERCISES: CPT

## 2024-03-07 NOTE — PROGRESS NOTES
"Daily Note     Today's date: 3/7/2024  Patient name: Cinthia Ortiz  : 1990  MRN: [id number]  Referring provider: Caity Parks,*  Dx:   Encounter Diagnosis     ICD-10-CM    1. Recurrent dislocation of right patella  M22.01       2. Tear of articular cartilage of knee as current injury, right, subsequent encounter  S83.31XD       3. Patellar instability of right knee  M25.361                      Subjective: Pt reports that the nerve block has worn off and her knee feels \"achy\" at present.        Objective: See treatment diary below      Assessment: Tolerated treatment well. Patient progressing within protocol guidelines.  Difficulty with obtaining quad activation and was substituting with activation of glut mm.     Plan: Continue per plan of care.      Precautions: s/p OATS procedure and MPFL reconstruction R knee       Ther Ex 3/4 3/7           Heel slides AA  x30 AA  x30           QS 5\"  x50 5\"  x50           HS str 3x30\" 3x30\"           Gastroc str 3x30\" 3x30\"                                                                                                                                                                                                                                                                               Gait Training 3/4 3/7           Brief HK                         Modalities 3/4 3/7            PRN 15'  Pre tx                             "

## 2024-03-08 ENCOUNTER — TELEPHONE (OUTPATIENT)
Dept: NEUROLOGY | Facility: CLINIC | Age: 34
End: 2024-03-08

## 2024-03-08 NOTE — TELEPHONE ENCOUNTER
ADD ON, Dr. Salter, ALL, 3/12/2024, 1:30 pm, ASAP pt triage response and scheduled as requested.    E-Verified     Thank you all,     Jo Ann

## 2024-03-11 ENCOUNTER — TELEPHONE (OUTPATIENT)
Dept: NEUROLOGY | Facility: CLINIC | Age: 34
End: 2024-03-11

## 2024-03-11 ENCOUNTER — OFFICE VISIT (OUTPATIENT)
Dept: PHYSICAL THERAPY | Facility: MEDICAL CENTER | Age: 34
End: 2024-03-11
Payer: COMMERCIAL

## 2024-03-11 DIAGNOSIS — M25.361 PATELLAR INSTABILITY OF RIGHT KNEE: ICD-10-CM

## 2024-03-11 DIAGNOSIS — S83.31XD TEAR OF ARTICULAR CARTILAGE OF KNEE AS CURRENT INJURY, RIGHT, SUBSEQUENT ENCOUNTER: ICD-10-CM

## 2024-03-11 DIAGNOSIS — M22.01 RECURRENT DISLOCATION OF RIGHT PATELLA: Primary | ICD-10-CM

## 2024-03-11 PROCEDURE — 97110 THERAPEUTIC EXERCISES: CPT

## 2024-03-11 PROCEDURE — 97112 NEUROMUSCULAR REEDUCATION: CPT

## 2024-03-11 NOTE — TELEPHONE ENCOUNTER
Patient called in to reschedule 3/12/24 appointment.    New appointment for consult is: 3/18/24 @ 1:30 in [place] Office with Dr. BENÍTEZ

## 2024-03-11 NOTE — TELEPHONE ENCOUNTER
Called and left VM for pt to call back and reschedule appt w/ Dr SCOTT for 3/12 due to Dr SCOTT being out of office.   Provided call back number

## 2024-03-11 NOTE — PROGRESS NOTES
"Daily Note     Today's date: 3/11/2024  Patient name: Cinthia Ortiz  : 1990  MRN: [id number]  Referring provider: Caity Parks,*  Dx:   Encounter Diagnosis     ICD-10-CM    1. Recurrent dislocation of right patella  M22.01       2. Tear of articular cartilage of knee as current injury, right, subsequent encounter  S83.31XD       3. Patellar instability of right knee  M25.361                      Subjective: Pt reports that her knee and Ue's are sore from walking around more this weekend.       Objective: See treatment diary below      Assessment: Tolerated treatment well. Patient is making steady progress within protocol guidelines.  Pt would benefit from continued PT      Plan: Continue per plan of care.      Precautions: s/p OATS procedure and MPFL reconstruction R knee       Ther Ex 3/4 3/7 3/11          Heel slides AA  x30 AA  x30 AA  x30          QS 5\"  x50 5\"  x50 5\"  x50          HS str 3x30\" 3x30\" 3x30\"          Gastroc str 3x30\" 3x30\" 3x30\"                                                                                                                                                                                                                                                                              Gait Training 3/4 3/7           Brief HK                         Modalities 3/4 3/7 3/11          MH PRN 15'  Pre tx Deferred                              "

## 2024-03-14 ENCOUNTER — OFFICE VISIT (OUTPATIENT)
Dept: PHYSICAL THERAPY | Facility: MEDICAL CENTER | Age: 34
End: 2024-03-14
Payer: COMMERCIAL

## 2024-03-14 DIAGNOSIS — S83.31XD TEAR OF ARTICULAR CARTILAGE OF KNEE AS CURRENT INJURY, RIGHT, SUBSEQUENT ENCOUNTER: ICD-10-CM

## 2024-03-14 DIAGNOSIS — M25.361 PATELLAR INSTABILITY OF RIGHT KNEE: ICD-10-CM

## 2024-03-14 DIAGNOSIS — M22.01 RECURRENT DISLOCATION OF RIGHT PATELLA: Primary | ICD-10-CM

## 2024-03-14 PROCEDURE — 97112 NEUROMUSCULAR REEDUCATION: CPT

## 2024-03-14 PROCEDURE — 97110 THERAPEUTIC EXERCISES: CPT

## 2024-03-14 NOTE — PROGRESS NOTES
"Daily Note     Today's date: 3/14/2024  Patient name: Cinthia Ortiz  : 1990  MRN: [id number]  Referring provider: Caity Parks,*  Dx:   Encounter Diagnosis     ICD-10-CM    1. Recurrent dislocation of right patella  M22.01       2. Tear of articular cartilage of knee as current injury, right, subsequent encounter  S83.31XD       3. Patellar instability of right knee  M25.361                      Subjective: Pt reports that sometimes her knee feels uncomfortably tight when doing heel slides.       Objective: See treatment diary below      Assessment: Tolerated treatment well. Patient  was able to progress further in her mobility with heel slides as per protocol guidelines.  Gait training was progressed as well to toe touch weight bearing with brace locked and was practiced in clinic with good understanding and form.  Adjustments were made to brace for proper fit.       Plan: Continue per plan of care.      Precautions: s/p OATS procedure and MPFL reconstruction R knee       Ther Ex 3/4 3/7 3/11 3/14         Heel slides AA  x30 AA  x30 AA  x30 AA  x30         QS 5\"  x50 5\"  x50 5\"  x50 5\"  x50         HS str 3x30\" 3x30\" 3x30\" 3x30\"         Gastroc str 3x30\" 3x30\" 3x30\" 3x30\"                                                                                                                                                                                                                                                                             Gait Training 3/4 3/7  3/14         Brief HK   KO                      Modalities 3/4 3/7 3/11 3/14         MH PRN 15'  Pre tx Deferred 15'  post                               "

## 2024-03-18 ENCOUNTER — OFFICE VISIT (OUTPATIENT)
Dept: PHYSICAL THERAPY | Facility: MEDICAL CENTER | Age: 34
End: 2024-03-18
Payer: COMMERCIAL

## 2024-03-18 ENCOUNTER — CONSULT (OUTPATIENT)
Dept: NEUROLOGY | Facility: CLINIC | Age: 34
End: 2024-03-18
Payer: COMMERCIAL

## 2024-03-18 ENCOUNTER — APPOINTMENT (OUTPATIENT)
Dept: LAB | Facility: MEDICAL CENTER | Age: 34
End: 2024-03-18
Payer: COMMERCIAL

## 2024-03-18 VITALS
HEIGHT: 71 IN | HEART RATE: 95 BPM | SYSTOLIC BLOOD PRESSURE: 98 MMHG | OXYGEN SATURATION: 98 % | WEIGHT: 165 LBS | TEMPERATURE: 98.1 F | BODY MASS INDEX: 23.1 KG/M2 | DIASTOLIC BLOOD PRESSURE: 62 MMHG

## 2024-03-18 DIAGNOSIS — G50.9 TRIGEMINAL NEUROPATHY: ICD-10-CM

## 2024-03-18 DIAGNOSIS — S83.31XD TEAR OF ARTICULAR CARTILAGE OF KNEE AS CURRENT INJURY, RIGHT, SUBSEQUENT ENCOUNTER: ICD-10-CM

## 2024-03-18 DIAGNOSIS — B02.30 HERPES ZOSTER OPHTHALMICUS OF LEFT EYE: ICD-10-CM

## 2024-03-18 DIAGNOSIS — M22.01 RECURRENT DISLOCATION OF RIGHT PATELLA: Primary | ICD-10-CM

## 2024-03-18 DIAGNOSIS — B02.29 POST HERPETIC NEURALGIA: ICD-10-CM

## 2024-03-18 DIAGNOSIS — M25.361 PATELLAR INSTABILITY OF RIGHT KNEE: ICD-10-CM

## 2024-03-18 DIAGNOSIS — G50.9 TRIGEMINAL NEUROPATHY: Primary | ICD-10-CM

## 2024-03-18 DIAGNOSIS — G51.0 FACIAL PARALYSIS ON LEFT SIDE: ICD-10-CM

## 2024-03-18 PROCEDURE — 86038 ANTINUCLEAR ANTIBODIES: CPT

## 2024-03-18 PROCEDURE — 82565 ASSAY OF CREATININE: CPT

## 2024-03-18 PROCEDURE — 82306 VITAMIN D 25 HYDROXY: CPT

## 2024-03-18 PROCEDURE — 97112 NEUROMUSCULAR REEDUCATION: CPT

## 2024-03-18 PROCEDURE — 99244 OFF/OP CNSLTJ NEW/EST MOD 40: CPT | Performed by: PSYCHIATRY & NEUROLOGY

## 2024-03-18 PROCEDURE — 82607 VITAMIN B-12: CPT

## 2024-03-18 PROCEDURE — 84520 ASSAY OF UREA NITROGEN: CPT

## 2024-03-18 PROCEDURE — 36415 COLL VENOUS BLD VENIPUNCTURE: CPT

## 2024-03-18 PROCEDURE — 86140 C-REACTIVE PROTEIN: CPT

## 2024-03-18 PROCEDURE — 85652 RBC SED RATE AUTOMATED: CPT

## 2024-03-18 PROCEDURE — 97110 THERAPEUTIC EXERCISES: CPT

## 2024-03-18 NOTE — PROGRESS NOTES
Patient ID: Cinthia Ortiz is a 33 y.o. female.    Assessment/Plan:           Problem List Items Addressed This Visit          Nervous and Auditory    Facial paralysis on left side    Post herpetic neuralgia    Relevant Orders    Vitamin D 25 hydroxy    MRI brain cavernous / trigeminal wo and w contrast    Vitamin B12    Sedimentation rate, automated    C-reactive protein    DIANNE w/Reflex if Positive    BUN    Creatinine, serum    Herpes zoster ophthalmicus of left eye    Relevant Orders    MRI brain cavernous / trigeminal wo and w contrast    Vitamin B12    Sedimentation rate, automated    C-reactive protein    DIANNE w/Reflex if Positive     Other Visit Diagnoses       Trigeminal neuropathy    -  Primary    Relevant Orders    BUN    Creatinine, serum             Mrs. Ortiz has presented to [facility] for evaluation.    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  Patient had made wonderful recovery with prolonged antiviral regimen required at that time with residual corneal abrasion being described, as per the patient.     Patient has not had any other major complication with no facial asymmetry or hearing loss been reported, which brought concern for dissemination of the shingles to other cranial nerves.  Patient was advised to proceed with MRI brain under trigeminal neuralgia protocol.     We discussed potential causes of developing shingles at patient young age, we agreed that shingles took place at the time when she had COVID-19 infection and common cold.  The Anika time of 2023.  Patient will be advised to have vitamin D and vitamin B12 level checked.    Patient has family history of multiple sclerosis as patient's mother was present during today's office visit.  Patient and her mother has hypermobile joints with close follow-up with rheumatology will be offered going  forward as patient has patellar instability of right knee requiring patient using braces.  ESR/CRP/DIANNE was offered during this visit to complete her neurological workup.    Patient describes no other sensorimotor dysfunction upper or lower extremity.  Patient has residual sensory loss in her forehead at the area of rash which cleared at this point but has skin demarcation.    Patient has persistent constipation as she is to consider adjusting her dietary changes and introducing more Pre- and probiotics.     Patient is to follow-up with [facility] neurology 1 week after MRI completed and available for my review.    I have spent a total time of 40 minutes on 03/18/24 in caring for this patient including Prognosis, Counseling / Coordination of care, Documenting in the medical record, Reviewing / ordering tests, medicine, procedures  , and Obtaining or reviewing history  .       Subjective: left facial paresthesia in forehead, postherpetic neuropathy.      HPI    Ms. Ortiz is a 33-year-old female who presented to [facility] multiple sclerosis center for relation of sensory dysfunction in left side of forehead.    Patient describes developing shingles in the setting of COVID and cold infection around the Christmas of 2023.  Patient had developed herpes zoster ophthalmicus of the left eye with residual visual dysfunction reported due to corneal abrasion.  Patient requires valacyclovir treatment.  Patient also described instance where she had left-sided facial weakness with near syncopal events.  Patient did not have hearing loss or complete facial paralysis.  Patient has residual scar tissue on her forehead with intermittent paresthesia been reported.  Patient does not have jolts of the pain with no concern for postherpetic neuralgia.  Patient has right knee instability since 2008 first knee injury.  Patient has hypermobile joints same as her mother.  Patient also describes having left shoulder numbers at the area of  shoulder blades with numbness is not painful.  Patient has family history of multiple sclerosis in her mother.    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    No bladder dysfunction, no balance dysfunction, no concentration problem.  Patient has single miscarriage, no kids.        The following portions of the patient's history were reviewed and updated as appropriate: She  has a past medical history of Abnormal Pap smear of cervix, Arthritis, Asthma, COVID-19, HPV (human papilloma virus) infection, Miscarriage, Osteoarthritis, Shingles, and Varicella.  She   Patient Active Problem List    Diagnosis Date Noted    Recurrent dislocation of right patella 2024    Patellar instability of right knee 2024    Tear of articular cartilage of knee as current injury, right, subsequent encounter 2024    Facial paralysis on left side 2024    Post herpetic neuralgia 2024    Herpes zoster ophthalmicus of left eye 2024    Shingles of eyelid 2023    Skin lesion 2023    Cardiac murmur 2022    Lower extremity edema 2022    Spontaneous  in first trimester 2022    Right ovarian cyst 2022    Gastroesophageal reflux disease without esophagitis 2021    ASCUS with positive high risk HPV cervical 2017     She  has a past surgical history that includes Knee surgery (Right); pr arthrs knee debridement/shaving artclr crtlg (Right, 2023); Ellinger tooth extraction; pr autologous chondrocyte implantation knee (Right, 2024); and Distal patellar realignment (Right, 2024).  Her family history includes Alzheimer's disease in her paternal grandfather; Bipolar disorder in her cousin; Breast cancer in her cousin; Diabetes in her paternal grandfather; Heart attack in her maternal grandfather, maternal grandmother, and paternal grandfather; Heart disease in her maternal grandfather; Hyperlipidemia in her  father; Hypertension in her mother; Miscarriages / Stillbirths in her mother; Multiple sclerosis in her mother; Thyroid disease in her mother.  She  reports that she has never smoked. She has never used smokeless tobacco. She reports current alcohol use of about 1.0 standard drink of alcohol per week. She reports that she does not use drugs.  Current Outpatient Medications   Medication Sig Dispense Refill    aspirin (ECOTRIN LOW STRENGTH) 81 mg EC tablet Take 1 tablet (81 mg total) by mouth 2 (two) times a day 84 tablet 0    hydrochlorothiazide (HYDRODIURIL) 25 mg tablet TAKE 1 TABLET (25 MG TOTAL) BY MOUTH DAILY. (Patient taking differently: Take 25 mg by mouth if needed) 30 tablet 5    ondansetron (ZOFRAN) 4 mg tablet Take 1 tablet (4 mg total) by mouth every 8 (eight) hours as needed for nausea or vomiting 20 tablet 0    ondansetron (ZOFRAN-ODT) 4 mg disintegrating tablet DISSOLVE 1 TABLET BY MOUTH EVERY 8 HOURS AS NEEDED FOR NAUSEA/ VOMITING      Ascorbic Acid (VITAMIN C PO) Take by mouth in the morning (Patient not taking: Reported on 2/21/2024)      aspirin (ECOTRIN LOW STRENGTH) 81 mg EC tablet Take 1 tablet (81 mg total) by mouth 2 (two) times a day 84 tablet 0    gabapentin (Neurontin) 300 mg capsule Take 1 capsule (300 mg total) by mouth 3 (three) times a day (Patient not taking: Reported on 2/21/2024) 90 capsule 1    Multiple Vitamin (MULTI-VITAMIN PO) Take by mouth in the morning (Patient not taking: Reported on 2/21/2024)      oxyCODONE (ROXICODONE) 5 immediate release tablet Take 1 tablet (5 mg total) by mouth every 4 (four) hours as needed for moderate pain for up to 15 doses Max Daily Amount: 30 mg (Patient not taking: Reported on 3/6/2024) 15 tablet 0    oxyCODONE (ROXICODONE) 5 immediate release tablet Take 1 tablet (5 mg total) by mouth every 4 (four) hours as needed for moderate pain for up to 15 doses Max Daily Amount: 30 mg (Patient not taking: Reported on 3/6/2024) 15 tablet 0    prednisoLONE  acetate (PRED FORTE) 1 % ophthalmic suspension 1 DROP EVERY 2 HOURS TO LEFT EYE FOR ONE DAY THEN 4XDAY      trifluridine (VIROPTIC) 1 % ophthalmic solution  (Patient not taking: Reported on 2/21/2024)      valACYclovir (VALTREX) 1,000 mg tablet Take 1,000 mg by mouth 2 (two) times a day (Patient not taking: Reported on 2/21/2024)       No current facility-administered medications for this visit.     Current Outpatient Medications on File Prior to Visit   Medication Sig    aspirin (ECOTRIN LOW STRENGTH) 81 mg EC tablet Take 1 tablet (81 mg total) by mouth 2 (two) times a day    hydrochlorothiazide (HYDRODIURIL) 25 mg tablet TAKE 1 TABLET (25 MG TOTAL) BY MOUTH DAILY. (Patient taking differently: Take 25 mg by mouth if needed)    ondansetron (ZOFRAN) 4 mg tablet Take 1 tablet (4 mg total) by mouth every 8 (eight) hours as needed for nausea or vomiting    ondansetron (ZOFRAN-ODT) 4 mg disintegrating tablet DISSOLVE 1 TABLET BY MOUTH EVERY 8 HOURS AS NEEDED FOR NAUSEA/ VOMITING    Ascorbic Acid (VITAMIN C PO) Take by mouth in the morning (Patient not taking: Reported on 2/21/2024)    aspirin (ECOTRIN LOW STRENGTH) 81 mg EC tablet Take 1 tablet (81 mg total) by mouth 2 (two) times a day    gabapentin (Neurontin) 300 mg capsule Take 1 capsule (300 mg total) by mouth 3 (three) times a day (Patient not taking: Reported on 2/21/2024)    Multiple Vitamin (MULTI-VITAMIN PO) Take by mouth in the morning (Patient not taking: Reported on 2/21/2024)    oxyCODONE (ROXICODONE) 5 immediate release tablet Take 1 tablet (5 mg total) by mouth every 4 (four) hours as needed for moderate pain for up to 15 doses Max Daily Amount: 30 mg (Patient not taking: Reported on 3/6/2024)    oxyCODONE (ROXICODONE) 5 immediate release tablet Take 1 tablet (5 mg total) by mouth every 4 (four) hours as needed for moderate pain for up to 15 doses Max Daily Amount: 30 mg (Patient not taking: Reported on 3/6/2024)    prednisoLONE acetate (PRED FORTE) 1 %  "ophthalmic suspension 1 DROP EVERY 2 HOURS TO LEFT EYE FOR ONE DAY THEN 4XDAY    trifluridine (VIROPTIC) 1 % ophthalmic solution  (Patient not taking: Reported on 2/21/2024)    valACYclovir (VALTREX) 1,000 mg tablet Take 1,000 mg by mouth 2 (two) times a day (Patient not taking: Reported on 2/21/2024)     No current facility-administered medications on file prior to visit.     She has No Known Allergies..         Objective:    Blood pressure 98/62, pulse 95, temperature 98.1 °F (36.7 °C), temperature source Temporal, height 5' 11\" (1.803 m), weight 74.8 kg (165 lb), last menstrual period 02/01/2024, SpO2 98%, not currently breastfeeding.    Physical Exam    Neurological Exam    CONSTITUTIONAL: NAD, pleasant. NECK: supple, no lymphadenopathy, no thyromegaly, no JVD. CARDIOVASCULAR: RRR, normal S1S2, no murmurs, no rubs. RESP: clear to auscultation bilaterally, no wheezes/rhonchi/rales. ABDOMEN: soft, non tender, non distended. SKIN: no rash or skin lesions. EXTREMITIES: no edema, pulses 2+bilaterally. PSYCH: appropriate mood and affect  NEUROLOGIC COMPREHENSIVE EXAM: Patient is oriented to person, place and time, NAD; appropriate affect. CN II, III, IV, V, VI, VII,VIII,IX,X,XI-XII intact with EOMI, PERRLA, OKN intact, VF grossly intact, fundi poorly visualized secondary to pupillary constriction; symmetric face noted. Motor: 5/5 UE/LE bilateral symmetric; RLE was not examined due to brace; Sensory: intact to light touch and pinprick bilaterally; normal vibration sensation feet bilaterally; Coordination within normal limits on FTN and [facility] testing; DTR: 2/4 through, no Babinski, no clonus. Tandem gait is intact. Romberg: absent.  ROS:    Review of Systems   Constitutional:  Negative for appetite change, fatigue and fever.   HENT: Negative.  Negative for hearing loss, tinnitus, trouble swallowing and voice change.    Eyes: Negative.  Negative for photophobia, pain and visual disturbance.   Respiratory: Negative.  " Negative for shortness of breath.    Cardiovascular: Negative.  Negative for palpitations.   Gastrointestinal: Negative.  Negative for nausea and vomiting.   Endocrine: Negative.  Negative for cold intolerance.   Genitourinary: Negative.  Negative for dysuria, frequency and urgency.   Musculoskeletal:  Negative for back pain, gait problem, myalgias, neck pain and neck stiffness.   Skin: Negative.  Negative for rash.   Allergic/Immunologic: Negative.    Neurological: Negative.  Negative for dizziness, tremors, seizures, syncope, facial asymmetry, speech difficulty, weakness, light-headedness, numbness and headaches.   Hematological: Negative.  Does not bruise/bleed easily.   Psychiatric/Behavioral: Negative.  Negative for confusion, hallucinations and sleep disturbance.

## 2024-03-18 NOTE — PROGRESS NOTES
"Daily Note     Today's date: 3/18/2024  Patient name: Cinthia Ortiz  : 1990  MRN: [id number]  Referring provider: Caity Parks,*  Dx:   Encounter Diagnosis     ICD-10-CM    1. Recurrent dislocation of right patella  M22.01       2. Tear of articular cartilage of knee as current injury, right, subsequent encounter  S83.31XD       3. Patellar instability of right knee  M25.361                      Subjective: Pt reports that her knee feels tight, especially around the incision.       Objective: See treatment diary below      Assessment: Tolerated treatment well. Patient demonstrated fatigue post treatment, exhibited good technique with therapeutic exercises, and would benefit from continued PT  Pt is making good progress according to protocol guidelines.       Plan: Continue per plan of care.      Precautions: s/p OATS procedure and MPFL reconstruction R knee       Ther Ex 3/4 3/7 3/11 3/14 3/18        Heel slides AA  x30 AA  x30 AA  x30 AA  x30 AA  x30        QS 5\"  x50 5\"  x50 5\"  x50 5\"  x50 5\"  x50        HS str 3x30\" 3x30\" 3x30\" 3x30\" 3x30\"        Gastroc str 3x30\" 3x30\" 3x30\" 3x30\" 3x30\"                                                                                                                                                                                                                                                                            Gait Training 3/4 3/7  3/14         Brief HK   KO                      Modalities 3/4 3/7 3/11 3/14 3/18        MH PRN 15'  Pre tx Deferred 15'  post 15'  post                                "

## 2024-03-19 ENCOUNTER — OFFICE VISIT (OUTPATIENT)
Dept: FAMILY MEDICINE CLINIC | Facility: CLINIC | Age: 34
End: 2024-03-19
Payer: COMMERCIAL

## 2024-03-19 VITALS
OXYGEN SATURATION: 98 % | RESPIRATION RATE: 17 BRPM | WEIGHT: 169 LBS | HEIGHT: 71 IN | HEART RATE: 81 BPM | DIASTOLIC BLOOD PRESSURE: 60 MMHG | TEMPERATURE: 97.7 F | BODY MASS INDEX: 23.66 KG/M2 | SYSTOLIC BLOOD PRESSURE: 108 MMHG

## 2024-03-19 DIAGNOSIS — E53.8 LOW SERUM VITAMIN B12: ICD-10-CM

## 2024-03-19 DIAGNOSIS — R29.2 HYPER REFLEXIA: ICD-10-CM

## 2024-03-19 DIAGNOSIS — B02.29 POST HERPETIC NEURALGIA: Primary | ICD-10-CM

## 2024-03-19 DIAGNOSIS — M25.361 PATELLAR INSTABILITY OF RIGHT KNEE: ICD-10-CM

## 2024-03-19 DIAGNOSIS — M22.01 RECURRENT DISLOCATION OF RIGHT PATELLA: ICD-10-CM

## 2024-03-19 PROBLEM — G51.0 FACIAL PARALYSIS ON LEFT SIDE: Status: RESOLVED | Noted: 2024-01-17 | Resolved: 2024-03-19

## 2024-03-19 LAB
25(OH)D3 SERPL-MCNC: 17.5 NG/ML (ref 30–100)
ANA SER QL IA: NEGATIVE
BUN SERPL-MCNC: 12 MG/DL (ref 5–25)
CREAT SERPL-MCNC: 0.7 MG/DL (ref 0.6–1.3)
CRP SERPL QL: 1.2 MG/L
ERYTHROCYTE [SEDIMENTATION RATE] IN BLOOD: 6 MM/HOUR (ref 0–19)
GFR SERPL CREATININE-BSD FRML MDRD: 115 ML/MIN/1.73SQ M
VIT B12 SERPL-MCNC: 278 PG/ML (ref 180–914)

## 2024-03-19 PROCEDURE — 99214 OFFICE O/P EST MOD 30 MIN: CPT | Performed by: NURSE PRACTITIONER

## 2024-03-19 RX ORDER — CYANOCOBALAMIN 1000 UG/ML
INJECTION, SOLUTION INTRAMUSCULAR; SUBCUTANEOUS
Qty: 9 ML | Refills: 0 | Status: SHIPPED | OUTPATIENT
Start: 2024-03-19

## 2024-03-19 NOTE — PROGRESS NOTES
FAMILY PRACTICE OFFICE VISIT       NAME: Cinthia Ortiz  AGE: 33 y.o. SEX: female       : 1990        MRN: [id number]    DATE: 3/19/2024  TIME: 1:32 PM    Assessment and Plan   1. Post herpetic neuralgia  Assessment & Plan:  Continues to improve        2. Hyper reflexia  -     Ambulatory Referral to Rheumatology; Future    3. Recurrent dislocation of right patella  Assessment & Plan:  Cont f/u with ortho      Orders:  -     Ambulatory referral to Family Practice    4. Patellar instability of right knee  Assessment & Plan:  Cont f/u with orhto      Orders:  -     Ambulatory referral to Family Practice         There are no Patient Instructions on file for this visit.        Chief Complaint     Chief Complaint   Patient presents with    Follow-up     Pt been seeing for follow up       History of Present Illness   Cinthia Ortiz is a 33 y.o.-year-old female who is here for f/u to postherpetic neuralgia  Weaned off gabapentin  Following with neurology  Low vit d and vit b12  To start supplements  Cleared from eye doctor, had corneal scarring though  Had right knee surgery and doing well  To have f/u with neurology  To have mri brain next week        Review of Systems   Review of Systems   Constitutional:  Negative for fatigue and fever.   HENT:  Negative for congestion, postnasal drip and rhinorrhea.    Eyes:  Negative for photophobia and visual disturbance.   Respiratory:  Negative for cough and shortness of breath.    Cardiovascular:  Negative for chest pain and palpitations.   Gastrointestinal:  Negative for abdominal pain, constipation, diarrhea, nausea and vomiting.   Genitourinary:  Negative for dysuria and frequency.   Musculoskeletal:  Negative for arthralgias and myalgias.   Skin:  Negative for rash.   Neurological:  Negative for dizziness, light-headedness and headaches.   Hematological:  Negative for adenopathy.   Psychiatric/Behavioral:  Negative for dysphoric mood and sleep disturbance. The patient is not  nervous/anxious.        Active Problem List     Patient Active Problem List   Diagnosis    ASCUS with positive high risk HPV cervical    Gastroesophageal reflux disease without esophagitis    Spontaneous  in first trimester    Right ovarian cyst    Cardiac murmur    Lower extremity edema    Skin lesion    Shingles of eyelid    Post herpetic neuralgia    Herpes zoster ophthalmicus of left eye    Recurrent dislocation of right patella    Patellar instability of right knee    Tear of articular cartilage of knee as current injury, right, subsequent encounter    Hyper reflexia         Past Medical History:  Past Medical History:   Diagnosis Date    Abnormal Pap smear of cervix     Arthritis     Osteoarthritis    Asthma     as child    COVID-19     2023    HPV (human papilloma virus) infection     Miscarriage     Osteoarthritis     Shingles     Varicella        Past Surgical History:  Past Surgical History:   Procedure Laterality Date    DISTAL PATELLAR REALIGNMENT Right 2024    Procedure: REALIGNMENT PATELLA;  Surgeon: Johnathon Clark DO;  Location:  MAIN OR;  Service: Orthopedics    KNEE SURGERY Right     MCL repair- high school    ID ARTHRS KNEE DEBRIDEMENT/SHAVING ARTCLR CRTLG Right 2023    Procedure: ARTHROSCOPY KNEE- chondroplasty with cartilage biopsy;  Surgeon: Johnathon Clark DO;  Location:  MAIN OR;  Service: Orthopedics    ID AUTOLOGOUS CHONDROCYTE IMPLANTATION KNEE Right 2024    Procedure: OATS PROCEDURE- THIS is a NGA- have NGA implant kit;  Surgeon: Johnathon Clark DO;  Location:  MAIN OR;  Service: Orthopedics    WISDOM TOOTH EXTRACTION         Family History:  Family History   Problem Relation Age of Onset    Multiple sclerosis Mother     Thyroid disease Mother     Hypertension Mother     Miscarriages / Stillbirths Mother     Hyperlipidemia Father     Breast cancer Cousin     Heart attack Maternal Grandfather     Heart disease Maternal Grandfather     Heart attack  Paternal Grandfather     Alzheimer's disease Paternal Grandfather     Diabetes Paternal Grandfather     Heart attack Maternal Grandmother     Bipolar disorder Cousin        Social History:  Social History     Socioeconomic History    Marital status: Single     Spouse name: Not on file    Number of children: Not on file    Years of education: Not on file    Highest education level: Not on file   Occupational History    Not on file   Tobacco Use    Smoking status: Never    Smokeless tobacco: Never   Vaping Use    Vaping status: Never Used   Substance and Sexual Activity    Alcohol use: Yes     Alcohol/week: 1.0 standard drink of alcohol     Types: 1 Standard drinks or equivalent per week     Comment: Socially    Drug use: Never    Sexual activity: Yes     Partners: Male     Birth control/protection: Condom Male     Comment: sporadic   Other Topics Concern    Not on file   Social History Narrative    Not on file     Social Determinants of Health     Financial Resource Strain: Not on file   Food Insecurity: Not on file   Transportation Needs: Not on file   Physical Activity: Not on file   Stress: Not on file   Social Connections: Not on file   Intimate Partner Violence: Not on file   Housing Stability: Not on file       Objective     Vitals:    03/19/24 0856   BP: 108/60   Pulse: 81   Resp: 17   Temp: 97.7 °F (36.5 °C)   SpO2: 98%     Wt Readings from Last 3 Encounters:   03/19/24 76.7 kg (169 lb)   03/18/24 74.8 kg (165 lb)   03/06/24 72.6 kg (160 lb)       Physical Exam  Vitals and nursing note reviewed.   Constitutional:       Appearance: Normal appearance.   HENT:      Head: Normocephalic and atraumatic.   Eyes:      Conjunctiva/sclera: Conjunctivae normal.   Pulmonary:      Effort: Pulmonary effort is normal.   Musculoskeletal:         General: Normal range of motion.      Cervical back: Normal range of motion.   Neurological:      Mental Status: She is alert and oriented to person, place, and time.   Psychiatric:   "       Mood and Affect: Mood normal.         Behavior: Behavior normal.         Pertinent Laboratory/Diagnostic Studies:  Lab Results   Component Value Date    BUN 12 03/18/2024    CREATININE 0.70 03/18/2024    CALCIUM 9.6 01/16/2024    K 3.7 01/16/2024    CO2 26 01/16/2024     01/16/2024     Lab Results   Component Value Date    ALT 11 01/16/2024    AST 13 01/16/2024    ALKPHOS 43 01/16/2024       Lab Results   Component Value Date    WBC 7.24 01/16/2024    HGB 14.4 01/16/2024    HCT 44.2 01/16/2024    MCV 97 01/16/2024     01/16/2024       No results found for: \"TSH\"    No results found for: \"CHOL\"  Lab Results   Component Value Date    TRIG 56 04/28/2023     Lab Results   Component Value Date    HDL 51 04/28/2023     Lab Results   Component Value Date    LDLCALC 85 04/28/2023     Lab Results   Component Value Date    HGBA1C 5.1 04/28/2023       Results for orders placed or performed in visit on 03/18/24   Vitamin D 25 hydroxy   Result Value Ref Range    Vit D, 25-Hydroxy 17.5 (L) 30.0 - 100.0 ng/mL   Vitamin B12   Result Value Ref Range    Vitamin B-12 278 180 - 914 pg/mL   Sedimentation rate, automated   Result Value Ref Range    Sed Rate 6 0 - 19 mm/hour   C-reactive protein   Result Value Ref Range    CRP 1.2 <3.0 mg/L   BUN   Result Value Ref Range    BUN 12 5 - 25 mg/dL   Creatinine, serum   Result Value Ref Range    Creatinine 0.70 0.60 - 1.30 mg/dL    eGFR 115 ml/min/1.73sq m   DIANNE Screen w/ Reflex to Titer/Pattern   Result Value Ref Range    DIANNE Negative Negative       Orders Placed This Encounter   Procedures    Ambulatory Referral to Rheumatology       ALLERGIES:  No Known Allergies    Current Medications     Current Outpatient Medications   Medication Sig Dispense Refill    aspirin (ECOTRIN LOW STRENGTH) 81 mg EC tablet Take 1 tablet (81 mg total) by mouth 2 (two) times a day 84 tablet 0    gabapentin (Neurontin) 300 mg capsule Take 1 capsule (300 mg total) by mouth 3 (three) times a day " 90 capsule 1    hydrochlorothiazide (HYDRODIURIL) 25 mg tablet TAKE 1 TABLET (25 MG TOTAL) BY MOUTH DAILY. (Patient taking differently: Take 25 mg by mouth if needed) 30 tablet 5    Multiple Vitamin (MULTI-VITAMIN PO) Take by mouth in the morning      ondansetron (ZOFRAN) 4 mg tablet Take 1 tablet (4 mg total) by mouth every 8 (eight) hours as needed for nausea or vomiting 20 tablet 0    Ascorbic Acid (VITAMIN C PO) Take by mouth in the morning (Patient not taking: Reported on 2/21/2024)      ondansetron (ZOFRAN-ODT) 4 mg disintegrating tablet DISSOLVE 1 TABLET BY MOUTH EVERY 8 HOURS AS NEEDED FOR NAUSEA/ VOMITING (Patient not taking: Reported on 3/19/2024)       No current facility-administered medications for this visit.         Health Maintenance     Health Maintenance   Topic Date Due    DTaP,Tdap,and Td Vaccines (1 - Tdap) Never done    Influenza Vaccine (1) Never done    COVID-19 Vaccine (5 - 2023-24 season) 06/19/2024 (Originally 9/1/2023)    Depression Screening  01/17/2025    Annual Physical  01/17/2025    Cervical Cancer Screening  08/23/2026    Zoster Vaccine (1 of 2) 08/22/2042    HIV Screening  Completed    Hepatitis C Screening  Completed    Pneumococcal Vaccine: Pediatrics (0 to 5 Years) and At-Risk Patients (6 to 64 Years)  Aged Out    HIB Vaccine  Aged Out    IPV Vaccine  Aged Out    Hepatitis A Vaccine  Aged Out    Meningococcal ACWY Vaccine  Aged Out    HPV Vaccine  Aged Out     Immunization History   Administered Date(s) Administered    COVID-19 PFIZER VACCINE 0.3 ML IM 12/22/2020, 01/10/2021, 10/06/2021, 10/06/2021    Typhoid, Unspecified 04/11/2012    Typhoid, ViCPs 04/11/2012          DIANA Winston

## 2024-03-20 ENCOUNTER — TELEPHONE (OUTPATIENT)
Dept: OBGYN CLINIC | Facility: MEDICAL CENTER | Age: 34
End: 2024-03-20

## 2024-03-20 NOTE — TELEPHONE ENCOUNTER
Caller: João    Doctor: Dr Clark     Reason for call: João is calling from [facility] - CPM [facility] has been denied. Due to the note not  Indicating the patient could not do Therapy or rehab post surgery.  João is also sending over a fax.  Thank you    Call back#: [phone]   ext [phone]

## 2024-03-21 ENCOUNTER — OFFICE VISIT (OUTPATIENT)
Dept: PHYSICAL THERAPY | Facility: MEDICAL CENTER | Age: 34
End: 2024-03-21
Payer: COMMERCIAL

## 2024-03-21 DIAGNOSIS — M22.01 RECURRENT DISLOCATION OF RIGHT PATELLA: Primary | ICD-10-CM

## 2024-03-21 DIAGNOSIS — M25.361 PATELLAR INSTABILITY OF RIGHT KNEE: ICD-10-CM

## 2024-03-21 DIAGNOSIS — S83.31XD TEAR OF ARTICULAR CARTILAGE OF KNEE AS CURRENT INJURY, RIGHT, SUBSEQUENT ENCOUNTER: ICD-10-CM

## 2024-03-21 PROCEDURE — 97112 NEUROMUSCULAR REEDUCATION: CPT | Performed by: PHYSICAL THERAPIST

## 2024-03-21 PROCEDURE — 97110 THERAPEUTIC EXERCISES: CPT | Performed by: PHYSICAL THERAPIST

## 2024-03-21 NOTE — PROGRESS NOTES
"Daily Note     Today's date: 3/21/2024  Patient name: Cinthia Ortiz  : 1990  MRN: [id number]  Referring provider: Catiy Parks,*  Dx:   Encounter Diagnosis     ICD-10-CM    1. Recurrent dislocation of right patella  M22.01       2. Tear of articular cartilage of knee as current injury, right, subsequent encounter  S83.31XD       3. Patellar instability of right knee  M25.361                      Subjective: Pt reports that she is doing well, CPM is at 60 degrees, but she only has it until the end of the week.        Objective: See treatment diary below      Assessment: Tolerated treatment well. Patient demonstrated fatigue post treatment, exhibited good technique with therapeutic exercises, and would benefit from continued PT      Plan: Continue per plan of care.      Precautions: s/p OATS procedure and MPFL reconstruction R knee       Ther Ex 3/4 3/7 3/11 3/14 3/18 3/21       Heel slides AA  x30 AA  x30 AA  x30 AA  x30 AA  x30 x30       QS 5\"  x50 5\"  x50 5\"  x50 5\"  x50 5\"  x50 5\"  x50       HS str 3x30\" 3x30\" 3x30\" 3x30\" 3x30\" 3x30\"       Gastroc str 3x30\" 3x30\" 3x30\" 3x30\" 3x30\" 3x30\"       Table knee flexion       x30       SLR flexion       AA  2x10                                                                                                                                                                                                                                                 Gait Training 3/4 3/7  3/14         Brief HK   KO                      Modalities 3/4 3/7 3/11 3/14 3/18 3/21       MH PRN 15'  Pre tx Deferred 15'  post 15'  post 15'  pre                                 "

## 2024-03-25 ENCOUNTER — OFFICE VISIT (OUTPATIENT)
Dept: PHYSICAL THERAPY | Facility: MEDICAL CENTER | Age: 34
End: 2024-03-25
Payer: COMMERCIAL

## 2024-03-25 DIAGNOSIS — S83.31XD TEAR OF ARTICULAR CARTILAGE OF KNEE AS CURRENT INJURY, RIGHT, SUBSEQUENT ENCOUNTER: ICD-10-CM

## 2024-03-25 DIAGNOSIS — M25.361 PATELLAR INSTABILITY OF RIGHT KNEE: ICD-10-CM

## 2024-03-25 DIAGNOSIS — M22.01 RECURRENT DISLOCATION OF RIGHT PATELLA: Primary | ICD-10-CM

## 2024-03-25 PROCEDURE — 97112 NEUROMUSCULAR REEDUCATION: CPT

## 2024-03-25 PROCEDURE — 97110 THERAPEUTIC EXERCISES: CPT

## 2024-03-25 NOTE — PROGRESS NOTES
"Daily Note     Today's date: 3/25/2024  Patient name: Cinthia Ortiz  : 1990  MRN: [id number]  Referring provider: Caity Parks,*  Dx:   Encounter Diagnosis     ICD-10-CM    1. Recurrent dislocation of right patella  M22.01       2. Tear of articular cartilage of knee as current injury, right, subsequent encounter  S83.31XD       3. Patellar instability of right knee  M25.361                      Subjective: Pt reports that her knee feels a little stiff due to prolonged sitting yesterday.        Objective: See treatment diary below      Assessment: Tolerated treatment well. Patient demonstrated fatigue post treatment, exhibited good technique with therapeutic exercises, and would benefit from continued PT  Pt's quad strength improved with increased reps of SLR's.       Plan: Continue per plan of care.      Precautions: s/p OATS procedure and MPFL reconstruction R knee       Ther Ex 3/4 3/7 3/11 3/14 3/18 3/21 3/25      Heel slides AA  x30 AA  x30 AA  x30 AA  x30 AA  x30 x30 x30      QS 5\"  x50 5\"  x50 5\"  x50 5\"  x50 5\"  x50 5\"  x50 5\"  x50      HS str 3x30\" 3x30\" 3x30\" 3x30\" 3x30\" 3x30\" 3x30\"      Gastroc str 3x30\" 3x30\" 3x30\" 3x30\" 3x30\" 3x30\" 3x30\"      Table knee flexion       x30 x30      SLR flexion       AA  2x10 AA  2x10  x5                                                                                                                                                                                                                                                Gait Training 3/4 3/7  3/14         Brief HK   KO                      Modalities 3/4 3/7 3/11 3/14 3/18 3/21 3/25      MH PRN 15'  Pre tx Deferred 15'  post 15'  post 15'  pre 15'  pre                                  "

## 2024-03-26 ENCOUNTER — TELEPHONE (OUTPATIENT)
Dept: NEUROLOGY | Facility: CLINIC | Age: 34
End: 2024-03-26

## 2024-03-26 ENCOUNTER — TELEPHONE (OUTPATIENT)
Dept: OBGYN CLINIC | Facility: HOSPITAL | Age: 34
End: 2024-03-26

## 2024-03-26 NOTE — TELEPHONE ENCOUNTER
Caller: Patient    Doctor: Amber    Reason for call: Patient had OATS Procedure on 2/29/24.  She was told to use her CPM machine for 8 weeks and the [employer] company picked it up yesterday and she only used it for 21 days.  Is this correct?  Also she received a denial for this yesterday after using it.  I told her we will probably receive a notification also requesting supporting documentation.  She will bring the letter with her at the next visit just in case.    Call back#: [phone]

## 2024-03-26 NOTE — TELEPHONE ENCOUNTER
Received: 1 week ago  Kayla Salter MD  [facility] [place] [facility] 3  Please call the patient regarding her abnormal result- low B12 level, B12 1000 mcg injections and sublingual supplements sent to the pharmacy.    _______________________________________        Left detailed msg (per consent in chart) regarding results and recommendations below.     Lab results sent to PCP.

## 2024-03-26 NOTE — TELEPHONE ENCOUNTER
Please call the patient and instruct that insurances typically only cover the CPM machine for 3 weeks, but on occasion they have approved the machine for longer durations. Unfortunately, her insurance has dictated that they will only cover for 3 weeks, but that is of no concern. As long as she keeps up with her range of motion exercises at home and performing her general range of motion exercises 2-3 times daily, like how she was with the CPM, this will be enough motion to provide nutrition to the graft to grow and makes sure she doesn't lose motion. Instruct that if she is interested, she could consider extending the rental of the CPM for a cost but she would have to get in contact with [employer]. But we don't feel it is neccessary that she rent the CPM out of pocket, if she is keeping up with her exercises and motion at home.

## 2024-03-27 DIAGNOSIS — S83.31XD TEAR OF ARTICULAR CARTILAGE OF KNEE AS CURRENT INJURY, RIGHT, SUBSEQUENT ENCOUNTER: ICD-10-CM

## 2024-03-27 DIAGNOSIS — M22.01 RECURRENT DISLOCATION OF RIGHT PATELLA: ICD-10-CM

## 2024-03-27 DIAGNOSIS — M25.361 PATELLAR INSTABILITY OF RIGHT KNEE: ICD-10-CM

## 2024-03-27 RX ORDER — ASPIRIN 81 MG/1
81 TABLET, COATED ORAL 2 TIMES DAILY
Qty: 60 TABLET | Refills: 1 | Status: SHIPPED | OUTPATIENT
Start: 2024-03-27

## 2024-03-28 ENCOUNTER — OFFICE VISIT (OUTPATIENT)
Dept: PHYSICAL THERAPY | Facility: MEDICAL CENTER | Age: 34
End: 2024-03-28
Payer: COMMERCIAL

## 2024-03-28 DIAGNOSIS — M25.361 PATELLAR INSTABILITY OF RIGHT KNEE: ICD-10-CM

## 2024-03-28 DIAGNOSIS — S83.31XD TEAR OF ARTICULAR CARTILAGE OF KNEE AS CURRENT INJURY, RIGHT, SUBSEQUENT ENCOUNTER: ICD-10-CM

## 2024-03-28 DIAGNOSIS — M22.01 RECURRENT DISLOCATION OF RIGHT PATELLA: Primary | ICD-10-CM

## 2024-03-28 PROCEDURE — 97112 NEUROMUSCULAR REEDUCATION: CPT

## 2024-03-28 PROCEDURE — 97110 THERAPEUTIC EXERCISES: CPT

## 2024-03-28 NOTE — PROGRESS NOTES
"Daily Note     Today's date: 3/28/2024  Patient name: Cinthia Ortiz  : 1990  MRN: [id number]  Referring provider: Caity Parks,*  Dx:   Encounter Diagnosis     ICD-10-CM    1. Recurrent dislocation of right patella  M22.01       2. Tear of articular cartilage of knee as current injury, right, subsequent encounter  S83.31XD       3. Patellar instability of right knee  M25.361                      Subjective: Pt reports that she is having difficulty obtaining more flexion mobility due to tightness in her knee.       Objective: See treatment diary below      Assessment: Tolerated treatment well. Patient demonstrated fatigue post treatment, exhibited good technique with therapeutic exercises, and would benefit from continued PT  Pt was able to perform last two sets of SLR's with very minimal assistance.       Plan: Continue per plan of care.      Precautions: s/p OATS procedure and MPFL reconstruction R knee       Ther Ex 3/4 3/7 3/11 3/14 3/18 3/21 3/25 3/28     Heel slides AA  x30 AA  x30 AA  x30 AA  x30 AA  x30 x30 x30 x30     QS 5\"  x50 5\"  x50 5\"  x50 5\"  x50 5\"  x50 5\"  x50 5\"  x50 5\"  x50     HS str 3x30\" 3x30\" 3x30\" 3x30\" 3x30\" 3x30\" 3x30\" 3x30\"     Gastroc str 3x30\" 3x30\" 3x30\" 3x30\" 3x30\" 3x30\" 3x30\" 3x30\"     Table knee flexion       x30 x30 x30     SLR flexion       AA  2x10 AA  2x10  x5 Min A  3x12                                                                                                                                                                                                                                               Gait Training 3/4 3/7  3/14         Brief HK   KO                      Modalities 3/4 3/7 3/11 3/14 3/18 3/21 3/25 3/28     MH PRN 15'  Pre tx Deferred 15'  post 15'  post 15'  pre 15'  pre 15'  Pre                                   "

## 2024-03-29 ENCOUNTER — HOSPITAL ENCOUNTER (OUTPATIENT)
Facility: MEDICAL CENTER | Age: 34
Discharge: HOME | End: 2024-03-29
Payer: COMMERCIAL

## 2024-03-29 DIAGNOSIS — B02.30 HERPES ZOSTER OPHTHALMICUS OF LEFT EYE: ICD-10-CM

## 2024-03-29 DIAGNOSIS — B02.29 POST HERPETIC NEURALGIA: ICD-10-CM

## 2024-03-29 PROCEDURE — A9585 GADOBUTROL INJECTION: HCPCS | Performed by: NURSE PRACTITIONER

## 2024-03-29 PROCEDURE — 70553 MRI BRAIN STEM W/O & W/DYE: CPT

## 2024-03-29 RX ORDER — GADOBUTROL 604.72 MG/ML
8.5 INJECTION INTRAVENOUS
Status: COMPLETED | OUTPATIENT
Start: 2024-03-29 | End: 2024-03-29

## 2024-03-29 RX ADMIN — GADOBUTROL 8.5 ML: 604.72 INJECTION INTRAVENOUS at 09:25

## 2024-04-01 ENCOUNTER — OFFICE VISIT (OUTPATIENT)
Dept: PHYSICAL THERAPY | Facility: MEDICAL CENTER | Age: 34
End: 2024-04-01
Payer: COMMERCIAL

## 2024-04-01 DIAGNOSIS — S83.31XD TEAR OF ARTICULAR CARTILAGE OF KNEE AS CURRENT INJURY, RIGHT, SUBSEQUENT ENCOUNTER: ICD-10-CM

## 2024-04-01 DIAGNOSIS — M22.01 RECURRENT DISLOCATION OF RIGHT PATELLA: Primary | ICD-10-CM

## 2024-04-01 DIAGNOSIS — M25.361 PATELLAR INSTABILITY OF RIGHT KNEE: ICD-10-CM

## 2024-04-01 PROCEDURE — 97112 NEUROMUSCULAR REEDUCATION: CPT

## 2024-04-01 PROCEDURE — 97110 THERAPEUTIC EXERCISES: CPT

## 2024-04-01 NOTE — PROGRESS NOTES
"Daily Note     Today's date: 2024  Patient name: Cinthia Ortiz  : 1990  MRN: [id number]  Referring provider: Caity Parks,*  Dx:   Encounter Diagnosis     ICD-10-CM    1. Recurrent dislocation of right patella  M22.01       2. Tear of articular cartilage of knee as current injury, right, subsequent encounter  S83.31XD       3. Patellar instability of right knee  M25.361                      Subjective: Pt notes  increased knee discomfort when lowering her leg down from SLR and has been working on performing this exercise independently at home.       Objective: See treatment diary below      Assessment: Tolerated treatment well. Patient demonstrated fatigue post treatment, exhibited good technique with therapeutic exercises, and would benefit from continued PT  Pt demonstrates improvement with performing her SLR independently.  Opened brace to 90 deg for ambulation as per protocol.   Plan: Continue per plan of care.      Precautions: s/p OATS procedure and MPFL reconstruction R knee       Ther Ex 3/4 3/7 3/11 3/14 3/18 3/21 3/25 3/28 4/1    Heel slides AA  x30 AA  x30 AA  x30 AA  x30 AA  x30 x30 x30 x30 x30    QS 5\"  x50 5\"  x50 5\"  x50 5\"  x50 5\"  x50 5\"  x50 5\"  x50 5\"  x50 5\"  x50    HS str 3x30\" 3x30\" 3x30\" 3x30\" 3x30\" 3x30\" 3x30\" 3x30\" 3x30\"    Gastroc str 3x30\" 3x30\" 3x30\" 3x30\" 3x30\" 3x30\" 3x30\" 3x30\" 3x30\"    Table knee flexion       x30 x30 x30 x30    SLR flexion       AA  2x10 AA  2x10  x5 Min A  3x12 Min A first  Set  3x12    SLR abd, add, ext         3x10    Standing hip flexion         3x10                                                                                                                                                                                                                    Gait Training 3/4 3/7  3/14         Brief HK   KO                      Modalities 3/4 3/7 3/11 3/14 3/18 3/21 3/25 3/28 4/1    MH PRN 15'  Pre tx Deferred 15'  post 15'  post 15'  pre " 15'  pre 15'  Pre 15'  Pre

## 2024-04-03 ENCOUNTER — OFFICE VISIT (OUTPATIENT)
Dept: OBGYN CLINIC | Facility: MEDICAL CENTER | Age: 34
End: 2024-04-03

## 2024-04-03 VITALS
HEART RATE: 91 BPM | SYSTOLIC BLOOD PRESSURE: 108 MMHG | DIASTOLIC BLOOD PRESSURE: 71 MMHG | HEIGHT: 71 IN | WEIGHT: 169 LBS | BODY MASS INDEX: 23.66 KG/M2

## 2024-04-03 DIAGNOSIS — S83.31XD TEAR OF ARTICULAR CARTILAGE OF KNEE AS CURRENT INJURY, RIGHT, SUBSEQUENT ENCOUNTER: Primary | ICD-10-CM

## 2024-04-03 PROCEDURE — 99024 POSTOP FOLLOW-UP VISIT: CPT | Performed by: ORTHOPAEDIC SURGERY

## 2024-04-03 NOTE — PROGRESS NOTES
Knee Post Operative Visit     Assesment:     33 y.o. female 5 week s/p right knee with ACI of the patella with MPFL reconstruction with allograft, DOS: 2/29/24, work on flexion to obtain full flexion over next several weeks    Plan:    Post-Operative treatment:    Ice to knee for 20 minutes at least 1-2 times daily.  PT per protocol   OTC NSAIDS prn for pain.    Imaging:    All imaging from today was reviewed by myself and explained to the patient.     ROM:   Week 0-1     0-20 degrees active and passive,   Week 2-3     Active 30-60 degrees,   Week 4-6     Active  degrees,   Week 7-12   Progress to active full motion     Weight bearing:   Week 0-1     <20%,   Week 2-3     <20- 50%,   Week 4-6     <75-full   Week 7-12   Full    Bracing:  Week 0-1     locked in extension during ambulation  Week 2-3     locked in extension during ambulation  Week 4-6    unlocked during ambulation  Week 7-12   no brace    DVT Prophylaxis:  Aspirin 81 mg oral twice daily x 6 weeks post-op and Ambulation    Follow up:   4 weeks, motion check     Patient was advised that if they have any fevers, chills, chest pain, shortness of breath, redness or drainage from the incision, please let our office know immediately.          Chief Complaint   Patient presents with    Right Knee - Follow-up     Some stiffness and tightness associated with knee, physical therapy going well.       History of Present Illness:    The patient is a 33 y.o. female who is being evaluated post operatively 5 weeks s/p right knee with ACI of the patella with MPFL reconstruction with allograft, DOS: 2/29/24.    Since the prior visit, She reports significant improvement.     Pain is well controlled.  The patient is using ice to control swelling.    They have started physical therapy.     She notes some stiffness.  She is doing PT.  No other complaints or instability.      The patient denies any fevers, chills, calf pain, chest pain/shortness of breath, redness or  "drainage from the incision.         I have reviewed the past medical, surgical, social and family history, medications and allergies as documented in the EMR.    Review of systems: ROS is negative other than that noted in the HPI.  Constitutional: Negative for fatigue and fever.        Physical Exam:    Blood pressure 108/71, pulse 91, height 5' 11\" (1.803 m), weight 76.7 kg (169 lb), not currently breastfeeding.    General/Constitutional: NAD, well developed, well nourished  HENT: Normocephalic, atraumatic  CV: Intact distal pulses, regular rate  Resp: No respiratory distress or labored breathing  Lymphatic: No lymphadenopathy palpated  Neuro: Alert and Oriented x 3, no focal deficits  Psych: Normal mood, normal affect, normal judgement, normal behavior  Skin: Warm, dry, no rashes, no erythema      Knee Exam (focused):                  RIGHT LEFT   ROM:   0-95 -5-130   Palpation: Effusion mild negative     MJL tenderness Negative Negative     LJL tenderness Negative Negative   Instability: Varus stable stable     Valgus stable stable   Special Tests: Lachman Negative Negative     Posterior drawer Negative Negative     Anterior drawer Negative Negative     Pivot shift not tested not tested     Dial not tested not tested   Patella: Palpation medial facet ttp no tenderness     Mobility 1/4 1/4     Apprehension Negative Negative   Other: Single leg 1/4 squat not tested not tested      Incisions show no erythema, no drainage    LE NV Exam: +2 DP/PT pulses bilaterally  Sensation intact to light touch L2-S1 bilaterally     Bilateral hip ROM demonstrates no pain actively or passively    No calf tenderness to palpation bilaterally      "

## 2024-04-03 NOTE — LETTER
April 3, 2024           Patient: Cinthia Ortiz   YOB: 1990   Date of Visit: 4/3/2024       To Whom It May Concern:    Cinthia Ortiz should remain out of work until 4-28-24, unless otherwise specified by me.       If you have questions, please do not hesitate to call me. I look forward to following your patient along with you.         Sincerely,        Johnathon Clark DO        CC: No Recipients    Johnathon Clark DO  4/3/2024  9:13 AM  Sign when Signing Visit  Knee Post Operative Visit     Assesment:     33 y.o. female 5 week s/p right knee with ACI of the patella with MPFL reconstruction with allograft, DOS: 2/29/24, work on flexion to obtain full flexion over next several weeks    Plan:    Post-Operative treatment:    Ice to knee for 20 minutes at least 1-2 times daily.  PT per protocol   OTC NSAIDS prn for pain.    Imaging:    All imaging from today was reviewed by myself and explained to the patient.     ROM:   Week 0-1     0-20 degrees active and passive,   Week 2-3     Active 30-60 degrees,   Week 4-6     Active  degrees,   Week 7-12   Progress to active full motion     Weight bearing:   Week 0-1     <20%,   Week 2-3     <20- 50%,   Week 4-6     <75-full   Week 7-12   Full    Bracing:  Week 0-1     locked in extension during ambulation  Week 2-3     locked in extension during ambulation  Week 4-6    unlocked during ambulation  Week 7-12   no brace    DVT Prophylaxis:  Aspirin 81 mg oral twice daily x 6 weeks post-op and Ambulation    Follow up:   4 weeks, motion check     Patient was advised that if they have any fevers, chills, chest pain, shortness of breath, redness or drainage from the incision, please let our office know immediately.          Chief Complaint   Patient presents with   • Right Knee - Follow-up     Some stiffness and tightness associated with knee, physical therapy going well.       History of Present Illness:    The patient is a 33 y.o. female who is being evaluated post  "operatively 5 weeks s/p right knee with ACI of the patella with MPFL reconstruction with allograft, DOS: 2/29/24.    Since the prior visit, She reports significant improvement.     Pain is well controlled.  The patient is using ice to control swelling.    They have started physical therapy.     She notes some stiffness.  She is doing PT.  No other complaints or instability.      The patient denies any fevers, chills, calf pain, chest pain/shortness of breath, redness or drainage from the incision.         I have reviewed the past medical, surgical, social and family history, medications and allergies as documented in the EMR.    Review of systems: ROS is negative other than that noted in the HPI.  Constitutional: Negative for fatigue and fever.        Physical Exam:    Blood pressure 108/71, pulse 91, height 5' 11\" (1.803 m), weight 76.7 kg (169 lb), not currently breastfeeding.    General/Constitutional: NAD, well developed, well nourished  HENT: Normocephalic, atraumatic  CV: Intact distal pulses, regular rate  Resp: No respiratory distress or labored breathing  Lymphatic: No lymphadenopathy palpated  Neuro: Alert and Oriented x 3, no focal deficits  Psych: Normal mood, normal affect, normal judgement, normal behavior  Skin: Warm, dry, no rashes, no erythema      Knee Exam (focused):                  RIGHT LEFT   ROM:   0-95 -5-130   Palpation: Effusion mild negative     MJL tenderness Negative Negative     LJL tenderness Negative Negative   Instability: Varus stable stable     Valgus stable stable   Special Tests: Lachman Negative Negative     Posterior drawer Negative Negative     Anterior drawer Negative Negative     Pivot shift not tested not tested     Dial not tested not tested   Patella: Palpation medial facet ttp no tenderness     Mobility 1/4 1/4     Apprehension Negative Negative   Other: Single leg 1/4 squat not tested not tested      Incisions show no erythema, no drainage    LE NV Exam: +2 DP/PT " pulses bilaterally  Sensation intact to light touch L2-S1 bilaterally     Bilateral hip ROM demonstrates no pain actively or passively    No calf tenderness to palpation bilaterally

## 2024-04-04 ENCOUNTER — OFFICE VISIT (OUTPATIENT)
Dept: PHYSICAL THERAPY | Facility: MEDICAL CENTER | Age: 34
End: 2024-04-04
Payer: COMMERCIAL

## 2024-04-04 DIAGNOSIS — M25.361 PATELLAR INSTABILITY OF RIGHT KNEE: ICD-10-CM

## 2024-04-04 DIAGNOSIS — M22.01 RECURRENT DISLOCATION OF RIGHT PATELLA: Primary | ICD-10-CM

## 2024-04-04 DIAGNOSIS — S83.31XD TEAR OF ARTICULAR CARTILAGE OF KNEE AS CURRENT INJURY, RIGHT, SUBSEQUENT ENCOUNTER: ICD-10-CM

## 2024-04-04 PROCEDURE — 97112 NEUROMUSCULAR REEDUCATION: CPT

## 2024-04-04 PROCEDURE — 97110 THERAPEUTIC EXERCISES: CPT

## 2024-04-04 NOTE — PROGRESS NOTES
"Daily Note     Today's date: 2024  Patient name: Cinthia Ortiz  : 1990  MRN: [id number]  Referring provider: Caity Parks,*  Dx:   Encounter Diagnosis     ICD-10-CM    1. Recurrent dislocation of right patella  M22.01       2. Tear of articular cartilage of knee as current injury, right, subsequent encounter  S83.31XD       3. Patellar instability of right knee  M25.361                      Subjective: Pt reports that her knee is feeling a little better but continues to have tightness at the top of her knee.       Objective: See treatment diary below      Assessment: Tolerated treatment well. Patient demonstrated fatigue post treatment, exhibited good technique with therapeutic exercises, and would benefit from continued PT  Pt contiues to make steady progress towards her goals.       Plan: Continue per plan of care.      Precautions: s/p OATS procedure and MPFL reconstruction R knee       Ther Ex 3/4 3/7 3/11 3/14 3/18 3/21 3/25 3/28 4/1 4/4   Heel slides AA  x30 AA  x30 AA  x30 AA  x30 AA  x30 x30 x30 x30 x30 x30   QS 5\"  x50 5\"  x50 5\"  x50 5\"  x50 5\"  x50 5\"  x50 5\"  x50 5\"  x50 5\"  x50 5\"  x50   HS str 3x30\" 3x30\" 3x30\" 3x30\" 3x30\" 3x30\" 3x30\" 3x30\" 3x30\" 3x30\"   Gastroc str 3x30\" 3x30\" 3x30\" 3x30\" 3x30\" 3x30\" 3x30\" 3x30\" 3x30\" 3x30\"   Table knee flexion       x30 x30 x30 x30 x30   SLR flexion       AA  2x10 AA  2x10  x5 Min A  3x12 Min A first  Set  3x12 3x10     SLR abd, add, ext         3x10 3x10   Standing hip flexion         3x10    Bike          10 min   Prone TKE          5\"  3x10                                                                                                                                                                                         Gait Training 3/4 3/7  3/14         Brief HK   KO                      Modalities 3/4 3/7 3/11 3/14 3/18 3/21 3/25 3/28 4/1    MH PRN 15'  Pre tx Deferred 15'  post 15'  post 15'  pre 15'  pre 15'  Pre 15'  Pre NP              "

## 2024-04-05 ENCOUNTER — OFFICE VISIT (OUTPATIENT)
Dept: NEUROLOGY | Facility: CLINIC | Age: 34
End: 2024-04-05
Payer: COMMERCIAL

## 2024-04-05 VITALS
HEIGHT: 71 IN | OXYGEN SATURATION: 99 % | HEART RATE: 96 BPM | DIASTOLIC BLOOD PRESSURE: 62 MMHG | TEMPERATURE: 98.1 F | BODY MASS INDEX: 23.46 KG/M2 | WEIGHT: 167.6 LBS | SYSTOLIC BLOOD PRESSURE: 96 MMHG

## 2024-04-05 DIAGNOSIS — E55.9 VITAMIN D DEFICIENCY: ICD-10-CM

## 2024-04-05 DIAGNOSIS — E53.8 LOW SERUM VITAMIN B12: ICD-10-CM

## 2024-04-05 DIAGNOSIS — B02.29 POST HERPETIC NEURALGIA: ICD-10-CM

## 2024-04-05 DIAGNOSIS — B02.30 HERPES ZOSTER OPHTHALMICUS OF LEFT EYE: Primary | ICD-10-CM

## 2024-04-05 PROCEDURE — 99214 OFFICE O/P EST MOD 30 MIN: CPT | Performed by: PSYCHIATRY & NEUROLOGY

## 2024-04-05 NOTE — PROGRESS NOTES
Patient ID: Cinthia Ortiz is a 33 y.o. female.    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 Ortiz has presented to [facility] [place] for follow-up on postherpetic neuralgia involving left V1 trigeminal nerve distribution.  Patient has reached wonderful outcomes with significant improvement in her sensory dysfunction appreciated.  Patient has been using gabapentin on as-needed basis at this point.    Patient completed serologic workup as we agreed patient has vitamin D deficiency as well as low vitamin B12 level, likely all contribute to the patient clinical presentation of developing shingles at her young age.  Patient started supplements and vitamin B12 injections.  Patient is to complete 6 months treatment and follow with primary care team.    We personally reviewed patient brain MRI, reviewed patient has no brainstem pathology with no signs of infection/inflammation along trigeminal nerve; at the same time patient has Left superior cerebellar artery contacts superior surface of left trigeminal nerve cisternal segment, nonconcerning but if patient describes recurrent bouts of facial sensory dysfunction, we may resume gabapentin again.    Patient's left occipital petalia, nonconcerning.    Patient is to follow with [facility] neurology on as-needed basis.    Subjective: patient did report her trigeminal is better, she has occasional tingling on the left side, but no pain.    HPI  Mrs. Ortiz has presented to [facility] [place] for evaluation.     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      Patient had made wonderful recovery with prolonged antiviral  regimen required at that time with residual corneal abrasion being described, as per the patient.     Patient continue making wonderful recovery from postherpetic neuralgia and she is here today to discuss her brain imaging results.    Patient does not believe she has any significant progression as she is taking gabapentin on as-needed basis due to limited pain at this point.     Patient presented today after surgical intervention of her right knee, no complication been reported, no signs of infection has been appreciated on exam.    Patient describes no other sensorimotor dysfunction upper or lower extremity.  Patient has residual sensory loss in her forehead at the area of rash which cleared at this point but has skin demarcation.     Patient has persistent constipation as she is to consider adjusting her dietary changes and introducing more Pre- and probiotics.       MRI brain 3/29/2024: Left superior cerebellar artery contacts superior surface of left trigeminal nerve cisternal segment. Otherwise, normal MRI of trigeminal nerves. Particularly no abnormal enhancement along left trigeminal nerve V1 segment. Left occipital petalia     The following portions of the patient's history were reviewed and updated as appropriate: She  has a past medical history of Abnormal Pap smear of cervix, Arthritis, Asthma, COVID-19, HPV (human papilloma virus) infection, Miscarriage, Osteoarthritis, Shingles, and Varicella.  She   Patient Active Problem List    Diagnosis Date Noted    Vitamin D deficiency 04/05/2024    Low serum vitamin B12 04/05/2024    Hyper reflexia 03/19/2024    Recurrent dislocation of right patella 02/29/2024    Patellar instability of right knee 02/29/2024    Tear of articular cartilage of knee as current injury, right, subsequent encounter 02/29/2024    Post herpetic neuralgia 01/17/2024    Herpes zoster ophthalmicus of left eye 01/17/2024    Shingles of eyelid 12/22/2023    Skin lesion 11/14/2023    Cardiac  murmur 2022    Lower extremity edema 2022    Spontaneous  in first trimester 2022    Right ovarian cyst 2022    Gastroesophageal reflux disease without esophagitis 2021    ASCUS with positive high risk HPV cervical 2017     She  has a past surgical history that includes Knee surgery (Right); pr arthrs knee debridement/shaving artclr crtlg (Right, 2023); [place] tooth extraction; pr autologous chondrocyte implantation knee (Right, 2024); and Distal patellar realignment (Right, 2024).  Her family history includes Alzheimer's disease in her paternal grandfather; Bipolar disorder in her cousin; Breast cancer in her cousin; Diabetes in her paternal grandfather; Heart attack in her maternal grandfather, maternal grandmother, and paternal grandfather; Heart disease in her maternal grandfather; Hyperlipidemia in her father; Hypertension in her mother; Miscarriages / Stillbirths in her mother; Multiple sclerosis in her mother; Thyroid disease in her mother.  She  reports that she has never smoked. She has never used smokeless tobacco. She reports current alcohol use of about 1.0 standard drink of alcohol per week. She reports that she does not use drugs.  Current Outpatient Medications   Medication Sig Dispense Refill    Aspirin Low Dose 81 MG EC tablet TAKE 1 TABLET BY MOUTH 2 TIMES A DAY. 60 tablet 1    cyanocobalamin 1,000 mcg/mL Take B12 1000 mcg IM once a week for 4 weeks, then once a month for 5 months 9 mL 0    Cyanocobalamin 1000 MCG SUBL Place 1 tablet (1,000 mcg total) under the tongue daily 90 tablet 0    hydrochlorothiazide (HYDRODIURIL) 25 mg tablet TAKE 1 TABLET (25 MG TOTAL) BY MOUTH DAILY. (Patient taking differently: Take 25 mg by mouth if needed) 30 tablet 5    Misc Natural Products (Osteo Bi-Flex Adv Joint Shield) TABS Take by mouth in the morning      ondansetron (ZOFRAN) 4 mg tablet Take 1 tablet (4 mg total) by mouth every 8 (eight) hours as  "needed for nausea or vomiting 20 tablet 0    SYRINGE-NEEDLE, DISP, 3 ML 25G X 1\" 3 ML MISC Inject into a muscle every 28 days 9 each 0    Ascorbic Acid (VITAMIN C PO) Take by mouth in the morning (Patient not taking: Reported on 2/21/2024)      gabapentin (Neurontin) 300 mg capsule Take 1 capsule (300 mg total) by mouth 3 (three) times a day (Patient not taking: Reported on 4/5/2024) 90 capsule 1    Multiple Vitamin (MULTI-VITAMIN PO) Take by mouth in the morning (Patient not taking: Reported on 4/3/2024)       No current facility-administered medications for this visit.     Current Outpatient Medications on File Prior to Visit   Medication Sig    Aspirin Low Dose 81 MG EC tablet TAKE 1 TABLET BY MOUTH 2 TIMES A DAY.    cyanocobalamin 1,000 mcg/mL Take B12 1000 mcg IM once a week for 4 weeks, then once a month for 5 months    Cyanocobalamin 1000 MCG SUBL Place 1 tablet (1,000 mcg total) under the tongue daily    hydrochlorothiazide (HYDRODIURIL) 25 mg tablet TAKE 1 TABLET (25 MG TOTAL) BY MOUTH DAILY. (Patient taking differently: Take 25 mg by mouth if needed)    Misc Natural Products (Osteo Bi-Flex Adv Joint Shield) TABS Take by mouth in the morning    ondansetron (ZOFRAN) 4 mg tablet Take 1 tablet (4 mg total) by mouth every 8 (eight) hours as needed for nausea or vomiting    SYRINGE-NEEDLE, DISP, 3 ML 25G X 1\" 3 ML MISC Inject into a muscle every 28 days    Ascorbic Acid (VITAMIN C PO) Take by mouth in the morning (Patient not taking: Reported on 2/21/2024)    gabapentin (Neurontin) 300 mg capsule Take 1 capsule (300 mg total) by mouth 3 (three) times a day (Patient not taking: Reported on 4/5/2024)    Multiple Vitamin (MULTI-VITAMIN PO) Take by mouth in the morning (Patient not taking: Reported on 4/3/2024)     No current facility-administered medications on file prior to visit.     She has No Known Allergies..         Objective:    Blood pressure 96/62, pulse 96, temperature 98.1 °F (36.7 °C), temperature " "source Temporal, height 5' 11\" (1.803 m), weight 76 kg (167 lb 9.6 oz), SpO2 99%, not currently breastfeeding.    Physical Exam    Neurological Exam      ROS:    Review of Systems   Constitutional:  Negative for appetite change, fatigue and fever.   HENT: Negative.  Negative for hearing loss, tinnitus, trouble swallowing and voice change.    Eyes: Negative.  Negative for photophobia, pain and visual disturbance.   Respiratory: Negative.  Negative for shortness of breath.    Cardiovascular: Negative.  Negative for palpitations.   Gastrointestinal: Negative.  Negative for nausea and vomiting.   Endocrine: Negative.  Negative for cold intolerance.   Genitourinary: Negative.  Negative for dysuria, frequency and urgency.   Musculoskeletal:  Negative for back pain, gait problem, myalgias, neck pain and neck stiffness.   Skin: Negative.  Negative for rash.   Allergic/Immunologic: Negative.    Neurological: Negative.  Negative for dizziness, tremors, seizures, syncope, facial asymmetry, speech difficulty, weakness, light-headedness, numbness and headaches.   Hematological: Negative.  Does not bruise/bleed easily.   Psychiatric/Behavioral: Negative.  Negative for confusion, hallucinations and sleep disturbance.                    "

## 2024-04-05 NOTE — TELEPHONE ENCOUNTER
I called João last week and gave her our fax number for me to receive the denial to then ask for an appeal but the fax was never received. I called and left a voicemail to call me back because we still have not received the fax.

## 2024-04-08 ENCOUNTER — OFFICE VISIT (OUTPATIENT)
Dept: PHYSICAL THERAPY | Facility: MEDICAL CENTER | Age: 34
End: 2024-04-08
Payer: COMMERCIAL

## 2024-04-08 DIAGNOSIS — M22.01 RECURRENT DISLOCATION OF RIGHT PATELLA: Primary | ICD-10-CM

## 2024-04-08 DIAGNOSIS — M25.361 PATELLAR INSTABILITY OF RIGHT KNEE: ICD-10-CM

## 2024-04-08 DIAGNOSIS — S83.31XD TEAR OF ARTICULAR CARTILAGE OF KNEE AS CURRENT INJURY, RIGHT, SUBSEQUENT ENCOUNTER: ICD-10-CM

## 2024-04-08 PROCEDURE — 97110 THERAPEUTIC EXERCISES: CPT

## 2024-04-08 PROCEDURE — 97112 NEUROMUSCULAR REEDUCATION: CPT

## 2024-04-08 NOTE — PROGRESS NOTES
"Daily Note     Today's date: 2024  Patient name: Cinthia Ortiz  : 1990  MRN: [id number]  Referring provider: Caity Parks,*  Dx:   Encounter Diagnosis     ICD-10-CM    1. Recurrent dislocation of right patella  M22.01       2. Tear of articular cartilage of knee as current injury, right, subsequent encounter  S83.31XD       3. Patellar instability of right knee  M25.361                      Subjective: Pt reports that she is walking without crutches now and was walking quite a bit yesterday.      Objective: See treatment diary below      Assessment: Tolerated treatment well. Patient demonstrated fatigue post treatment, exhibited good technique with therapeutic exercises, and would benefit from continued PT  Pt's quad strength is improving with SLR's and is able to independently perform a full three sets of this exercise.       Plan: Continue per plan of care.      Precautions: s/p OATS procedure and MPFL reconstruction R knee       Ther Ex             Heel slides   x30            QS 5\"  x50            HS str 3x30\"            Gastroc str 3x30\"            Table knee flexion  x30            SLR flexion  3x12            SLR abd, add, ext 3x12            Standing hip flexion 3x10            Bike 10 min            Prone TKE 5\"  3x12                                                                                                                                                                                                  Gait Training             Brief                          Modalities             MH PRN                                                "

## 2024-04-09 ENCOUNTER — TELEPHONE (OUTPATIENT)
Age: 34
End: 2024-04-09

## 2024-04-09 NOTE — TELEPHONE ENCOUNTER
Caller: Shmuel/[facility]    Doctor: Amber     Reason for call: Questioned if we received a Letter of Medical Necessity that was faxed 4/9? Advised nothing noted to date. Medical Necessity was for a CPM. Please fax back to [phone]    Call back#: [phone]

## 2024-04-11 ENCOUNTER — OFFICE VISIT (OUTPATIENT)
Dept: PHYSICAL THERAPY | Facility: MEDICAL CENTER | Age: 34
End: 2024-04-11
Payer: COMMERCIAL

## 2024-04-11 DIAGNOSIS — M22.01 RECURRENT DISLOCATION OF RIGHT PATELLA: Primary | ICD-10-CM

## 2024-04-11 DIAGNOSIS — M25.361 PATELLAR INSTABILITY OF RIGHT KNEE: ICD-10-CM

## 2024-04-11 DIAGNOSIS — S83.31XD TEAR OF ARTICULAR CARTILAGE OF KNEE AS CURRENT INJURY, RIGHT, SUBSEQUENT ENCOUNTER: ICD-10-CM

## 2024-04-11 PROCEDURE — 97112 NEUROMUSCULAR REEDUCATION: CPT

## 2024-04-11 PROCEDURE — 97110 THERAPEUTIC EXERCISES: CPT

## 2024-04-11 NOTE — PROGRESS NOTES
"Daily Note     Today's date: 2024  Patient name: Cinthia Ortiz  : 1990  MRN: [id number]  Referring provider: Caity Parks,*  Dx:   Encounter Diagnosis     ICD-10-CM    1. Recurrent dislocation of right patella  M22.01       2. Tear of articular cartilage of knee as current injury, right, subsequent encounter  S83.31XD       3. Patellar instability of right knee  M25.361                      Subjective: Pt reports that she's been increasing her activities at home and her knee is feeling pretty good with this.       Objective: See treatment diary below      Assessment: Tolerated treatment well. Patient demonstrated fatigue post treatment, exhibited good technique with therapeutic exercises, and would benefit from continued PT  Pt is making good progress towards her goals.      Plan: Continue per plan of care.      Precautions: s/p OATS procedure and MPFL reconstruction R knee       Ther Ex            Heel slides   x30 x30           QS 5\"  x50 5\"  x50           HS str 3x30\" 3x30\"           Gastroc str 3x30\" 3x30\"           Table knee flexion  x30 x30           SLR flexion  3x12 3x12           SLR abd, add, ext 3x12 3x12           Standing hip flexion 3x10 3x12           Bike 10 min 10 min           Prone TKE 5\"  3x12 5\"  3x12           Prone knee flexion  3x10           HR's  x30                                                                                                                                                                       Gait Training             Brief                          Modalities             MH PRN                                                  "

## 2024-04-15 ENCOUNTER — OFFICE VISIT (OUTPATIENT)
Dept: PHYSICAL THERAPY | Facility: MEDICAL CENTER | Age: 34
End: 2024-04-15
Payer: COMMERCIAL

## 2024-04-15 DIAGNOSIS — S83.31XD TEAR OF ARTICULAR CARTILAGE OF KNEE AS CURRENT INJURY, RIGHT, SUBSEQUENT ENCOUNTER: ICD-10-CM

## 2024-04-15 DIAGNOSIS — M22.01 RECURRENT DISLOCATION OF RIGHT PATELLA: Primary | ICD-10-CM

## 2024-04-15 DIAGNOSIS — M25.361 PATELLAR INSTABILITY OF RIGHT KNEE: ICD-10-CM

## 2024-04-15 PROCEDURE — 97112 NEUROMUSCULAR REEDUCATION: CPT

## 2024-04-15 PROCEDURE — 97110 THERAPEUTIC EXERCISES: CPT

## 2024-04-15 NOTE — PROGRESS NOTES
"Daily Note     Today's date: 4/15/2024  Patient name: Cinthia Ortiz  : 1990  MRN: [id number]  Referring provider: Caity Parks,*  Dx: No diagnosis found.               Subjective: Pt reports that her knee is feeling good.       Objective: See treatment diary below      Assessment: Tolerated treatment well. Patient demonstrated fatigue post treatment, exhibited good technique with therapeutic exercises, and would benefit from continued PT  Pt is making steady progress towards her goals.       Plan: Continue per plan of care.      Precautions: s/p OATS procedure and MPFL reconstruction R knee       Ther Ex  4/15          Heel slides   x30 x30 x30          QS 5\"  x50 5\"  x50 5\"  x50          HS str 3x30\" 3x30\" 3x30\"          Gastroc str 3x30\" 3x30\" 3x30\"          Table knee flexion  x30 x30 x30          SLR flexion  3x12 3x12 3x15          SLR abd, add, ext 3x12 3x12 3x15          Standing hip flexion 3x10 3x12 3x15          Bike 10 min 10 min 10 min          Prone TKE 5\"  3x12 5\"  3x12 5\"  3x15          Prone knee flexion  3x10 2x10  x10  Stand          HR's  x30 x30                                                                                                                                                                      Gait Training             Brief                          Modalities             MH PRN                                                    "

## 2024-04-18 ENCOUNTER — APPOINTMENT (OUTPATIENT)
Dept: LAB | Facility: MEDICAL CENTER | Age: 34
End: 2024-04-18
Payer: COMMERCIAL

## 2024-04-18 ENCOUNTER — OFFICE VISIT (OUTPATIENT)
Dept: PHYSICAL THERAPY | Facility: MEDICAL CENTER | Age: 34
End: 2024-04-18
Payer: COMMERCIAL

## 2024-04-18 DIAGNOSIS — M22.01 RECURRENT DISLOCATION OF RIGHT PATELLA: Primary | ICD-10-CM

## 2024-04-18 DIAGNOSIS — Z00.8 HEALTH EXAMINATION IN POPULATION SURVEY: ICD-10-CM

## 2024-04-18 DIAGNOSIS — S83.31XD TEAR OF ARTICULAR CARTILAGE OF KNEE AS CURRENT INJURY, RIGHT, SUBSEQUENT ENCOUNTER: ICD-10-CM

## 2024-04-18 DIAGNOSIS — M25.361 PATELLAR INSTABILITY OF RIGHT KNEE: ICD-10-CM

## 2024-04-18 LAB
CHOLEST SERPL-MCNC: 148 MG/DL
EST. AVERAGE GLUCOSE BLD GHB EST-MCNC: 105 MG/DL
HBA1C MFR BLD: 5.3 %
HDLC SERPL-MCNC: 56 MG/DL
LDLC SERPL CALC-MCNC: 79 MG/DL (ref 0–100)
NONHDLC SERPL-MCNC: 92 MG/DL
TRIGL SERPL-MCNC: 64 MG/DL

## 2024-04-18 PROCEDURE — 36415 COLL VENOUS BLD VENIPUNCTURE: CPT

## 2024-04-18 PROCEDURE — 97110 THERAPEUTIC EXERCISES: CPT

## 2024-04-18 PROCEDURE — 80061 LIPID PANEL: CPT

## 2024-04-18 PROCEDURE — 97112 NEUROMUSCULAR REEDUCATION: CPT

## 2024-04-18 PROCEDURE — 83036 HEMOGLOBIN GLYCOSYLATED A1C: CPT

## 2024-04-18 NOTE — PROGRESS NOTES
"Daily Note     Today's date: 2024  Patient name: Cinthia Ortiz  : 1990  MRN: [id number]  Referring provider: Caity Parks,*  Dx:   Encounter Diagnosis     ICD-10-CM    1. Recurrent dislocation of right patella  M22.01       2. Tear of articular cartilage of knee as current injury, right, subsequent encounter  S83.31XD       3. Patellar instability of right knee  M25.361                      Subjective: Pt reports that her knee is feeling good and has been weaning off her brace at home.       Objective: See treatment diary below      Assessment: Tolerated treatment well. Patient demonstrated fatigue post treatment, exhibited good technique with therapeutic exercises, and would benefit from continued PT  Dc'd brace today as per protocol guidelines.  Pt is making steady progress in her strength and mobility.        Plan: Continue per plan of care.      Precautions: s/p OATS procedure and MPFL reconstruction R knee       Ther Ex 4/8 4/11 4/15 4/18         Heel slides   x30 x30 x30 x30         QS 5\"  x50 5\"  x50 5\"  x50 5\"  x50         HS str 3x30\" 3x30\" 3x30\" 3x30\"         Gastroc str 3x30\" 3x30\" 3x30\" 3x30\"         Table knee flexion  x30 x30 x30 x30         SLR flexion  3x12 3x12 3x15 1#  3x10         SLR abd, add, ext 3x12 3x12 3x15 1#  3x10         Standing hip flexion 3x10 3x12 3x15 1#  3x10         Bike 10 min 10 min 10 min 10 min         Prone TKE 5\"  3x12 5\"  3x12 5\"  3x15 5#  3x10         Prone knee flexion  3x10 2x10  x10  Stand 1#  Stand  3x10         HR's  x30 x30 x30         Step ups    L2  x30                                                                                                                                                        Gait Training             Brief                          Modalities             MH PRN                                                      "

## 2024-04-22 ENCOUNTER — OFFICE VISIT (OUTPATIENT)
Dept: PHYSICAL THERAPY | Facility: MEDICAL CENTER | Age: 34
End: 2024-04-22
Payer: COMMERCIAL

## 2024-04-22 DIAGNOSIS — S83.31XD TEAR OF ARTICULAR CARTILAGE OF KNEE AS CURRENT INJURY, RIGHT, SUBSEQUENT ENCOUNTER: ICD-10-CM

## 2024-04-22 DIAGNOSIS — M25.361 PATELLAR INSTABILITY OF RIGHT KNEE: ICD-10-CM

## 2024-04-22 DIAGNOSIS — M22.01 RECURRENT DISLOCATION OF RIGHT PATELLA: Primary | ICD-10-CM

## 2024-04-22 PROCEDURE — 97112 NEUROMUSCULAR REEDUCATION: CPT

## 2024-04-22 PROCEDURE — 97110 THERAPEUTIC EXERCISES: CPT

## 2024-04-22 NOTE — PROGRESS NOTES
"Daily Note     Today's date: 2024  Patient name: Cinthia Ortiz  : 1990  MRN: [id number]  Referring provider: Caity Parks,*  Dx:   Encounter Diagnosis     ICD-10-CM    1. Recurrent dislocation of right patella  M22.01       2. Tear of articular cartilage of knee as current injury, right, subsequent encounter  S83.31XD       3. Patellar instability of right knee  M25.361                      Subjective: Pt reports that she is making progress and sees a small improvement in her quad mm.       Objective: See treatment diary below      Assessment: Tolerated treatment well. Patient demonstrated fatigue post treatment, exhibited good technique with therapeutic exercises, and would benefit from continued PT  Pt is making steady progress.       Plan: Continue per plan of care.      Precautions: s/p OATS procedure and MPFL reconstruction R knee       Ther Ex 4/8 4/11 4/15 4/18 4/22        Heel slides   x30 x30 x30 x30 x30        QS 5\"  x50 5\"  x50 5\"  x50 5\"  x50 5\"  x50        HS str 3x30\" 3x30\" 3x30\" 3x30\" 3x30\"        Gastroc str 3x30\" 3x30\" 3x30\" 3x30\" 3x30\"        Table knee flexion  x30 x30 x30 x30 x30        SLR flexion  3x12 3x12 3x15 1#  3x10 1#  3x12        SLR abd, add, ext 3x12 3x12 3x15 1#  3x10 1#  3x12        Standing hip flexion 3x10 3x12 3x15 1#  3x10 1#  3x12        Bike 10 min 10 min 10 min 10 min 10 min        Prone TKE 5\"  3x12 5\"  3x12 5\"  3x15 5#  3x10 5#  3x12        Prone knee flexion  3x10 2x10  x10  Stand 1#  Stand  3x10 1#  Stand  3x10        HR's  x30 x30 x30 x30        Step ups    L2  x30 And  Overs  L2  x30                                                                                                                                                       Gait Training             Brief                          Modalities              PRN                                                        "

## 2024-04-25 ENCOUNTER — OFFICE VISIT (OUTPATIENT)
Dept: PHYSICAL THERAPY | Facility: MEDICAL CENTER | Age: 34
End: 2024-04-25
Payer: COMMERCIAL

## 2024-04-25 DIAGNOSIS — M25.361 PATELLAR INSTABILITY OF RIGHT KNEE: ICD-10-CM

## 2024-04-25 DIAGNOSIS — S83.31XD TEAR OF ARTICULAR CARTILAGE OF KNEE AS CURRENT INJURY, RIGHT, SUBSEQUENT ENCOUNTER: ICD-10-CM

## 2024-04-25 DIAGNOSIS — M22.01 RECURRENT DISLOCATION OF RIGHT PATELLA: Primary | ICD-10-CM

## 2024-04-25 PROCEDURE — 97110 THERAPEUTIC EXERCISES: CPT

## 2024-04-25 PROCEDURE — 97112 NEUROMUSCULAR REEDUCATION: CPT

## 2024-04-25 NOTE — PROGRESS NOTES
"Daily Note     Today's date: 2024  Patient name: Cinthia Ortiz  : 1990  MRN: [id number]  Referring provider: Caity Parks,*  Dx:   Encounter Diagnosis     ICD-10-CM    1. Recurrent dislocation of right patella  M22.01       2. Tear of articular cartilage of knee as current injury, right, subsequent encounter  S83.31XD       3. 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  Pt states that all ex's felt good, but experiences some weakness and discomfort along her R lateral knee with flexion and extension movements. Pt states that she RTW next week.      Objective: See treatment diary below      Assessment: Tolerated treatment well. Patient demonstrated fatigue post treatment, exhibited good technique with therapeutic exercises, and would benefit from continued PT  Pt is making steady progress towards her goals.        Plan: Continue per plan of care.      Precautions: s/p OATS procedure and MPFL reconstruction R knee       Ther Ex 4/8 4/11 4/15 4/18 4/22 4/25       Heel slides   x30 x30 x30 x30 x30 x30       QS 5\"  x50 5\"  x50 5\"  x50 5\"  x50 5\"  x50 5\"  x50       HS str 3x30\" 3x30\" 3x30\" 3x30\" 3x30\" 3x30\"       Gastroc str 3x30\" 3x30\" 3x30\" 3x30\" 3x30\" 3x30\"       Table knee flexion  x30 x30 x30 x30 x30 x30       SLR flexion  3x12 3x12 3x15 1#  3x10 1#  3x12 2#  3x10       SLR abd, add, ext 3x12 3x12 3x15 1#  3x10 1#  3x12 2#  3x10       Standing hip flexion 3x10 3x12 3x15 1#  3x10 1#  3x12 2#  3x19       Bike 10 min 10 min 10 min 10 min 10 min 10 min       Prone TKE 5\"  3x12 5\"  3x12 5\"  3x15 5#  3x10 5#  3x12 5#  3x15       Prone knee flexion  3x10 2x10  x10  Stand 1#  Stand  3x10 1#  Stand  3x10 Stand  2#  3x10       HR's  x30 x30 x30 x30 x30       Step ups    L2  x30 And  Overs  L2  x30 And overs  L2  x30       SLS      3x30\"       LP      80#  3x12                                                            "                                                                 Gait Training             Brief                          Modalities             MH PRN

## 2024-04-29 ENCOUNTER — OFFICE VISIT (OUTPATIENT)
Dept: PHYSICAL THERAPY | Facility: MEDICAL CENTER | Age: 34
End: 2024-04-29
Payer: COMMERCIAL

## 2024-04-29 DIAGNOSIS — S83.31XD TEAR OF ARTICULAR CARTILAGE OF KNEE AS CURRENT INJURY, RIGHT, SUBSEQUENT ENCOUNTER: ICD-10-CM

## 2024-04-29 DIAGNOSIS — M22.01 RECURRENT DISLOCATION OF RIGHT PATELLA: Primary | ICD-10-CM

## 2024-04-29 DIAGNOSIS — M25.361 PATELLAR INSTABILITY OF RIGHT KNEE: ICD-10-CM

## 2024-04-29 PROCEDURE — 97112 NEUROMUSCULAR REEDUCATION: CPT

## 2024-04-29 PROCEDURE — 97110 THERAPEUTIC EXERCISES: CPT

## 2024-04-29 NOTE — PROGRESS NOTES
"Daily Note     Today's date: 2024  Patient name: Cinthia Ortiz  : 1990  MRN: [id number]  Referring provider: Caity Parsk,*  Dx:   Encounter Diagnosis     ICD-10-CM    1. Recurrent dislocation of right patella  M22.01       2. Tear of articular cartilage of knee as current injury, right, subsequent encounter  S83.31XD       3. Patellar instability of right knee  M25.361                      Subjective: Pt reports that her knee is feeling pretty good, but is a little nervous about starting work and her 10 hour shifts.       Objective: See treatment diary below      Assessment: Tolerated treatment well. Patient demonstrated fatigue post treatment, exhibited good technique with therapeutic exercises, and would benefit from continued PT  Pt is making good progress towards her goals and notes improved knee stability with ex's.        Plan: Continue per plan of care.      Precautions: s/p OATS procedure and MPFL reconstruction R knee       Ther Ex 4/8 4/11 4/15 4/18 4/22 4/25 4/29      Heel slides   x30 x30 x30 x30 x30 x30 x30      QS 5\"  x50 5\"  x50 5\"  x50 5\"  x50 5\"  x50 5\"  x50 5\"  x50      HS str 3x30\" 3x30\" 3x30\" 3x30\" 3x30\" 3x30\" 3x30\"      Gastroc str 3x30\" 3x30\" 3x30\" 3x30\" 3x30\" 3x30\" 3x30\"      Table knee flexion  x30 x30 x30 x30 x30 x30 x30      SLR flexion  3x12 3x12 3x15 1#  3x10 1#  3x12 2#  3x10 2#  3x12      SLR abd, add, ext 3x12 3x12 3x15 1#  3x10 1#  3x12 2#  3x10 2#  3x12      Standing hip flexion 3x10 3x12 3x15 1#  3x10 1#  3x12 2#  3x19 2#  3x12      Bike 10 min 10 min 10 min 10 min 10 min 10 min 10 min      Prone TKE 5\"  3x12 5\"  3x12 5\"  3x15 5#  3x10 5#  3x12 5#  3x15 5#  3x15      Prone knee flexion  3x10 2x10  x10  Stand 1#  Stand  3x10 1#  Stand  3x10 Stand  2#  3x10 Stand  2#  3x12      HR's  x30 x30 x30 x30 x30 x30      Step ups    L2  x30 And  Overs  L2  x30 And overs  L2  x30 And overs   L2  x30      SLS      3x30\" W/alpha  3x      LP      80#  3x12 80#  3x15           "                                                                                                                 Gait Training             Brief                          Modalities             MH PRN

## 2024-05-02 ENCOUNTER — OFFICE VISIT (OUTPATIENT)
Dept: PHYSICAL THERAPY | Facility: MEDICAL CENTER | Age: 34
End: 2024-05-02
Payer: COMMERCIAL

## 2024-05-02 DIAGNOSIS — M22.01 RECURRENT DISLOCATION OF RIGHT PATELLA: Primary | ICD-10-CM

## 2024-05-02 DIAGNOSIS — S83.31XD TEAR OF ARTICULAR CARTILAGE OF KNEE AS CURRENT INJURY, RIGHT, SUBSEQUENT ENCOUNTER: ICD-10-CM

## 2024-05-02 DIAGNOSIS — M25.361 PATELLAR INSTABILITY OF RIGHT KNEE: ICD-10-CM

## 2024-05-02 PROCEDURE — 97112 NEUROMUSCULAR REEDUCATION: CPT

## 2024-05-02 PROCEDURE — 97110 THERAPEUTIC EXERCISES: CPT

## 2024-05-02 NOTE — PROGRESS NOTES
"Daily Note     Today's date: 2024  Patient name: Cinthia Ortiz  : 1990  MRN: [id number]  Referring provider: Caity Parks,*  Dx:   Encounter Diagnosis     ICD-10-CM    1. Recurrent dislocation of right patella  M22.01       2. Tear of articular cartilage of knee as current injury, right, subsequent encounter  S83.31XD       3. Patellar instability of right knee  M25.361                      Subjective: Pt reports that she experienced some swelling in her knee after working a long shift.  Pt notes some medial knee tightness as well.       Objective: See treatment diary below      Assessment: Tolerated treatment well. Patient demonstrated fatigue post treatment, exhibited good technique with therapeutic exercises, and would benefit from continued PT  Pt advised to apply compression garment when working.  Trialed k-tape to incision to improve healing - assess at NV.       Plan: Continue per plan of care.      Precautions: s/p OATS procedure and MPFL reconstruction R knee       Ther Ex 4/8 4/11 4/15 4/18 4/22 4/25 4/29 5/2     Heel slides   x30 x30 x30 x30 x30 x30 x30 x30     QS 5\"  x50 5\"  x50 5\"  x50 5\"  x50 5\"  x50 5\"  x50 5\"  x50 5\"  x30     HS str 3x30\" 3x30\" 3x30\" 3x30\" 3x30\" 3x30\" 3x30\" 3x30\"     Gastroc str 3x30\" 3x30\" 3x30\" 3x30\" 3x30\" 3x30\" 3x30\" 3x30\"     Table knee flexion  x30 x30 x30 x30 x30 x30 x30 x30     SLR flexion  3x12 3x12 3x15 1#  3x10 1#  3x12 2#  3x10 2#  3x12 2#  3x15     SLR abd, add, ext 3x12 3x12 3x15 1#  3x10 1#  3x12 2#  3x10 2#  3x12 2#  3x15     Standing hip flexion 3x10 3x12 3x15 1#  3x10 1#  3x12 2#  3x19 2#  3x12 2#  3x15     Bike 10 min 10 min 10 min 10 min 10 min 10 min 10 min 10 min     Prone TKE 5\"  3x12 5\"  3x12 5\"  3x15 5#  3x10 5#  3x12 5#  3x15 5#  3x15 10#  3x15     Prone knee flexion  3x10 2x10  x10  Stand 1#  Stand  3x10 1#  Stand  3x10 Stand  2#  3x10 Stand  2#  3x12 Stand  2#  3x15     HR's  x30 x30 x30 x30 x30 x30 x30     Step ups    L2  x30 " "And  Overs  L2  x30 And overs  L2  x30 And overs   L2  x30 And  Overs  L2  x30     SLS      3x30\" W/alpha  3x W/  Alpha  3x     LP      80#  3x12 80#  3x15 90#  3x10     Wall slides        3x10                                                                                                             Gait Training             Brief                          Modalities              PRN                                                              "

## 2024-05-06 ENCOUNTER — OFFICE VISIT (OUTPATIENT)
Dept: PHYSICAL THERAPY | Facility: MEDICAL CENTER | Age: 34
End: 2024-05-06
Payer: COMMERCIAL

## 2024-05-06 DIAGNOSIS — S83.31XD TEAR OF ARTICULAR CARTILAGE OF KNEE AS CURRENT INJURY, RIGHT, SUBSEQUENT ENCOUNTER: ICD-10-CM

## 2024-05-06 DIAGNOSIS — M22.01 RECURRENT DISLOCATION OF RIGHT PATELLA: Primary | ICD-10-CM

## 2024-05-06 PROCEDURE — 97110 THERAPEUTIC EXERCISES: CPT

## 2024-05-06 PROCEDURE — 97112 NEUROMUSCULAR REEDUCATION: CPT

## 2024-05-06 NOTE — PROGRESS NOTES
"Daily Note     Today's date: 2024  Patient name: Cinthia Ortiz  : 1990  MRN: [id number]  Referring provider: Caity Parks,*  Dx:   Encounter Diagnosis     ICD-10-CM    1. Recurrent dislocation of right patella  M22.01       2. Tear of articular cartilage of knee as current injury, right, subsequent encounter  S83.31XD                      Subjective: Pt reports that the k-tape on her incisions felt good, but has a remaining stitch at the top of her medial incision.        Objective: See treatment diary below      Assessment: Tolerated treatment well. 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       Plan: Continue per plan of care.      Precautions: s/p OATS procedure and MPFL reconstruction R knee       Ther Ex 4/8 4/11 4/15 4/18 4/22 4/25 4/29 5/2 5/6    Heel slides   x30 x30 x30 x30 x30 x30 x30 x30 x30    QS 5\"  x50 5\"  x50 5\"  x50 5\"  x50 5\"  x50 5\"  x50 5\"  x50 5\"  x30 5\"  x30    HS str 3x30\" 3x30\" 3x30\" 3x30\" 3x30\" 3x30\" 3x30\" 3x30\" 3x30\"    Gastroc str 3x30\" 3x30\" 3x30\" 3x30\" 3x30\" 3x30\" 3x30\" 3x30\" 3x30\"    Table knee flexion  x30 x30 x30 x30 x30 x30 x30 x30 x30    SLR flexion  3x12 3x12 3x15 1#  3x10 1#  3x12 2#  3x10 2#  3x12 2#  3x15 2#  3x15    SLR abd, add, ext 3x12 3x12 3x15 1#  3x10 1#  3x12 2#  3x10 2#  3x12 2#  3x15 2#  3x15    Standing hip flexion 3x10 3x12 3x15 1#  3x10 1#  3x12 2#  3x19 2#  3x12 2#  3x15 2#  3x15    Bike 10 min 10 min 10 min 10 min 10 min 10 min 10 min 10 min 10 min    Prone TKE 5\"  3x12 5\"  3x12 5\"  3x15 5#  3x10 5#  3x12 5#  3x15 5#  3x15 10#  3x15 10#  3x15    Prone knee flexion  3x10 2x10  x10  Stand 1#  Stand  3x10 1#  Stand  3x10 Stand  2#  3x10 Stand  2#  3x12 Stand  2#  3x15 Stand  2#  3x15    HR's  x30 x30 x30 x30 x30 x30 x30 x30    Step ups    L2  x30 And  Overs  L2  x30 " "And overs  L2  x30 And overs   L2  x30 And  Overs  L2  x30 And overs  L2  x30    SLS      3x30\" W/alpha  3x W/  Alpha  3x W/alpha  3x    LP      80#  3x12 80#  3x15 90#  3x10 90#  3x12    Wall slides        3x10 3x15                                                                                                            Gait Training             Brief                          Modalities         5/6     PRN            K-tape to incisions         KO                                           "

## 2024-05-17 ENCOUNTER — OFFICE VISIT (OUTPATIENT)
Dept: OBGYN CLINIC | Facility: MEDICAL CENTER | Age: 34
End: 2024-05-17

## 2024-05-17 ENCOUNTER — OFFICE VISIT (OUTPATIENT)
Dept: PHYSICAL THERAPY | Facility: MEDICAL CENTER | Age: 34
End: 2024-05-17
Payer: COMMERCIAL

## 2024-05-17 VITALS
HEIGHT: 71 IN | DIASTOLIC BLOOD PRESSURE: 70 MMHG | SYSTOLIC BLOOD PRESSURE: 100 MMHG | BODY MASS INDEX: 22.82 KG/M2 | WEIGHT: 163 LBS | HEART RATE: 74 BPM

## 2024-05-17 DIAGNOSIS — Z98.890 S/P ARTHROSCOPIC SURGERY OF RIGHT KNEE: Primary | ICD-10-CM

## 2024-05-17 DIAGNOSIS — S83.31XD TEAR OF ARTICULAR CARTILAGE OF KNEE AS CURRENT INJURY, RIGHT, SUBSEQUENT ENCOUNTER: ICD-10-CM

## 2024-05-17 DIAGNOSIS — M25.361 PATELLAR INSTABILITY OF RIGHT KNEE: ICD-10-CM

## 2024-05-17 DIAGNOSIS — M22.01 RECURRENT DISLOCATION OF RIGHT PATELLA: Primary | ICD-10-CM

## 2024-05-17 PROCEDURE — 99024 POSTOP FOLLOW-UP VISIT: CPT | Performed by: ORTHOPAEDIC SURGERY

## 2024-05-17 PROCEDURE — 97110 THERAPEUTIC EXERCISES: CPT

## 2024-05-17 PROCEDURE — 97112 NEUROMUSCULAR REEDUCATION: CPT

## 2024-05-17 NOTE — PROGRESS NOTES
Knee Post Operative Visit     Assesment:     33 y.o. female 11 weeks s/p right knee open autologous chondrocyte implantation and MPFL reconstruction with gracilis allograft . DOS: February 29, 2024.  She is doing very well and we will continue physical therapy as per protocol.  She may continue to work with a 20 pound lifting restriction.  A new note was facilitated for her today.  She is encouraged to continue working on her range of motion as well as swelling.  I did note if she has persistent swelling of the knee a joint aspiration could be considered in the future.  I do not believe this is indicated at this time.  I would like her to follow-up with me in 6 to 8 weeks for repeat clinical evaluation.    Plan:    Post-Operative treatment:    Ice to knee for 20 minutes at least 1-2 times daily.  PT for ROM/strengthening to knee, hip and core.  OTC NSAIDS prn for pain.  Tylenol for pain.    Imaging:    No imaging was available for review today.    Weight bearing:  as tolerated     ROM:  0-90    Brace:  No brace needed    DVT Prophylaxis:  Ambulation    Follow up:   6-8 weeks          Chief Complaint   Patient presents with    Right Knee - Post-op     Pt states that she has been doing well and PT is progressing well. Pt states that she is back to work and only has mild stiffness at the end of the day.        History of Present Illness:    The patient is a 33 y.o. female who is being evaluated post operatively 11 weeks status post open NGA implantation of the patella and MPFL reconstruction with gracilis allograft.  She states that she is doing very well and has no bouts of recurrent patellar instability.  She also notes that the pain posterior to her patella is also resolved.  She does experience mild soreness at the lateral aspect.  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  She notes a little bit of swelling at the end of her day.  However, physical therapy is progressing as expected and  "is able to perform a straight leg raise and has excellent range of motion.  Overall she is quite happy with her progress up to this point.           I have reviewed the past medical, surgical, social and family history, medications and allergies as documented in the EMR.    Review of systems: ROS is negative other than that noted in the HPI.  Constitutional: Negative for fatigue and fever.        Physical Exam:    Blood pressure 100/70, pulse 74, height 5' 11\" (1.803 m), weight 73.9 kg (163 lb), not currently breastfeeding.    General/Constitutional: NAD, well developed, well nourished  HENT: Normocephalic, atraumatic  CV: Intact distal pulses, regular rate  Resp: No respiratory distress or labored breathing  Lymphatic: No lymphadenopathy palpated  Neuro: Alert and Oriented x 3, no focal deficits  Psych: Normal mood, normal affect, normal judgement, normal behavior  Skin: Warm, dry, no rashes, no erythema      Knee Exam (focused):                  RIGHT LEFT   ROM:   0-110 0-130   Palpation: Effusion mild negative     MJL tenderness Negative Negative     LJL tenderness Negative Negative   Instability: Varus stable stable     Valgus stable stable   Special Tests: Lachman Negative Negative     Posterior drawer Negative Negative     Anterior drawer Negative Negative     Pivot shift not tested not tested     Dial not tested not tested   Patella: Palpation lateral facet ttp no tenderness     Mobility 1/4 1/4     Apprehension Negative Negative   Other: Single leg 1/4 squat not tested not tested      Incisions show no erythema, no drainage    LE NV Exam: +2 DP/PT pulses bilaterally  Sensation intact to light touch L2-S1 bilaterally     Bilateral hip ROM demonstrates no pain actively or passively    No calf tenderness to palpation bilaterally    Scribe Attestation      I,:  Riley Galo am acting as a scribe while in the presence of the attending physician.:       I,:  Johnathon Clark, DO personally performed the services " described in this documentation    as scribed in my presence.:

## 2024-05-17 NOTE — LETTER
May 17, 2024     Patient: Cinthia Ortiz  YOB: 1990  Date of Visit: 5/17/2024      To Whom it May Concern:    Cinthia Ortiz is under my professional care. Cinthia was seen in my office on 5/17/2024. Cinthia may return to work on light duty, no pushing, pulling, or lifting greater than 20lb. 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     If you have any questions or concerns, please don't hesitate to call.         Sincerely,          Johnathon Clark DO        CC: No Recipients

## 2024-05-17 NOTE — PROGRESS NOTES
"Daily Note     Today's date: 2024  Patient name: Cinthia Ortiz  : 1990  MRN: [id number]  Referring provider: Caity Parks,*  Dx:   Encounter Diagnosis     ICD-10-CM    1. Recurrent dislocation of right patella  M22.01       2. Tear of articular cartilage of knee as current injury, right, subsequent encounter  S83.31XD       3. Patellar instability of right knee  M25.361                      Subjective: Pt reports that her knee gets quite swollen with working multiple days in a row and standing all day.  Pt states that she is going to purchase a compression sleeve to help limit her edema in her knee when working.       Objective: See treatment diary below      Assessment: Tolerated treatment well. Patient demonstrated fatigue post treatment, exhibited good technique with therapeutic exercises, and would benefit from continued PT  Pt was challenged with added resistance for SLR flexion, so this decreased to keep better technique with exercise.       Plan: Continue per plan of care.      Precautions: s/p OATS procedure and MPFL reconstruction R knee       Ther Ex 4/8 4/11 4/15 4/18 4/22 4/25 4/29 5/2 5/6 5/17   Heel slides   x30 x30 x30 x30 x30 x30 x30 x30 x30 x30   QS 5\"  x50 5\"  x50 5\"  x50 5\"  x50 5\"  x50 5\"  x50 5\"  x50 5\"  x30 5\"  x30 5\"  x30   HS str 3x30\" 3x30\" 3x30\" 3x30\" 3x30\" 3x30\" 3x30\" 3x30\" 3x30\" 3x30\"   Gastroc str 3x30\" 3x30\" 3x30\" 3x30\" 3x30\" 3x30\" 3x30\" 3x30\" 3x30\" 3x30\"   Table knee flexion  x30 x30 x30 x30 x30 x30 x30 x30 x30 x30   SLR flexion  3x12 3x12 3x15 1#  3x10 1#  3x12 2#  3x10 2#  3x12 2#  3x15 2#  3x15 2#  3x15   SLR abd, add, ext 3x12 3x12 3x15 1#  3x10 1#  3x12 2#  3x10 2#  3x12 2#  3x15 2#  3x15 3#  3x10   Standing hip flexion 3x10 3x12 3x15 1#  3x10 1#  3x12 2#  3x19 2#  3x12 2#  3x15 2#  3x15 3#  3x10   Bike 10 min 10 min 10 min 10 min 10 min 10 min 10 min 10 min 10 min 10 min   Prone TKE 5\"  3x12 5\"  3x12 5\"  3x15 5#  3x10 5#  3x12 5#  3x15 5#  3x15 10#  3x15 " "10#  3x15 10#  3x15   Prone knee flexion  3x10 2x10  x10  Stand 1#  Stand  3x10 1#  Stand  3x10 Stand  2#  3x10 Stand  2#  3x12 Stand  2#  3x15 Stand  2#  3x15 Stand  3#  3x10   HR's  x30 x30 x30 x30 x30 x30 x30 x30 x30   Step ups    L2  x30 And  Overs  L2  x30 And overs  L2  x30 And overs   L2  x30 And  Overs  L2  x30 And overs  L2  x30 And  Overs  L2  x30   SLS      3x30\" W/alpha  3x W/  Alpha  3x W/alpha  3x W/  Alpha  3x   LP      80#  3x12 80#  3x15 90#  3x10 90#  3x12 110#  3x10   Wall slides        3x10 3x15 3x15                                                                                                           Gait Training             Brief                          Modalities         5/6    MH PRN            K-tape to incisions         KO                                             "

## 2024-05-23 ENCOUNTER — OFFICE VISIT (OUTPATIENT)
Dept: PHYSICAL THERAPY | Facility: MEDICAL CENTER | Age: 34
End: 2024-05-23
Payer: COMMERCIAL

## 2024-05-23 DIAGNOSIS — M22.01 RECURRENT DISLOCATION OF RIGHT PATELLA: Primary | ICD-10-CM

## 2024-05-23 DIAGNOSIS — M25.361 PATELLAR INSTABILITY OF RIGHT KNEE: ICD-10-CM

## 2024-05-23 DIAGNOSIS — S83.31XD TEAR OF ARTICULAR CARTILAGE OF KNEE AS CURRENT INJURY, RIGHT, SUBSEQUENT ENCOUNTER: ICD-10-CM

## 2024-05-23 PROCEDURE — 97110 THERAPEUTIC EXERCISES: CPT

## 2024-05-23 PROCEDURE — 97112 NEUROMUSCULAR REEDUCATION: CPT

## 2024-05-23 NOTE — PROGRESS NOTES
"Daily Note     Today's date: 2024  Patient name: Cinthia Ortiz  : 1990  MRN: [id number]  Referring provider: Caity Parks,*  Dx:   Encounter Diagnosis     ICD-10-CM    1. Recurrent dislocation of right patella  M22.01       2. Tear of articular cartilage of knee as current injury, right, subsequent encounter  S83.31XD       3. Patellar instability of right knee  M25.361                      Subjective: Pt reports that she is doing pretty good overall today. Notes that her quads are still weak but she has been doing her exercises.        Objective: See treatment diary below      Assessment: Tolerated treatment well. Patient demonstrated fatigue post treatment, exhibited good technique with therapeutic exercises, and would benefit from continued PT  Pt was challenged with added resistance for SLR flexion due to quad weakness but is improving. Re educated pt on the importance of her HEP and LE strengthening.       Plan: Continue per plan of care.      Precautions: s/p OATS procedure and MPFL reconstruction R knee       Ther Ex 4/8 4/11 4/15 4/18 4/22 4/25 4/29 5/2 5/6 5/17 5/23   Heel slides   x30 x30 x30 x30 x30 x30 x30 x30 x30 x30 x30   QS 5\"  x50 5\"  x50 5\"  x50 5\"  x50 5\"  x50 5\"  x50 5\"  x50 5\"  x30 5\"  x30 5\"  x30 5\"   HS str 3x30\" 3x30\" 3x30\" 3x30\" 3x30\" 3x30\" 3x30\" 3x30\" 3x30\" 3x30\" 3x30\"   Gastroc str 3x30\" 3x30\" 3x30\" 3x30\" 3x30\" 3x30\" 3x30\" 3x30\" 3x30\" 3x30\" 3x30\"   Table knee flexion  x30 x30 x30 x30 x30 x30 x30 x30 x30 x30 x30   SLR flexion  3x12 3x12 3x15 1#  3x10 1#  3x12 2#  3x10 2#  3x12 2#  3x15 2#  3x15 2#  3x15 2# 3x15   SLR abd, add, ext 3x12 3x12 3x15 1#  3x10 1#  3x12 2#  3x10 2#  3x12 2#  3x15 2#  3x15 3#  3x10 3# 3x10   Standing hip flexion 3x10 3x12 3x15 1#  3x10 1#  3x12 2#  3x19 2#  3x12 2#  3x15 2#  3x15 3#  3x10 3# 3x10   Bike 10 min 10 min 10 min 10 min 10 min 10 min 10 min 10 min 10 min 10 min 10 min   Prone TKE 5\"  3x12 5\"  3x12 5\"  3x15 5#  3x10 5#  3x12 5#  3x15 " "5#  3x15 10#  3x15 10#  3x15 10#  3x15 10#  3x15   Prone knee flexion  3x10 2x10  x10  Stand 1#  Stand  3x10 1#  Stand  3x10 Stand  2#  3x10 Stand  2#  3x12 Stand  2#  3x15 Stand  2#  3x15 Stand  3#  3x10 Stand  3#  3x10   HR's  x30 x30 x30 x30 x30 x30 x30 x30 x30 x30   Step ups    L2  x30 And  Overs  L2  x30 And overs  L2  x30 And overs   L2  x30 And  Overs  L2  x30 And overs  L2  x30 And  Overs  L2  x30 and  Overs  L2   SLS      3x30\" W/alpha  3x W/  Alpha  3x W/alpha  3x W/  Alpha  3x W/  Alpha  3x   LP      80#  3x12 80#  3x15 90#  3x10 90#  3x12 110#  3x10 110# 3x10   Wall slides        3x10 3x15 3x15 3x15                                                                                                                   Gait Training              Brief                            Modalities         5/6     MH PRN             K-tape to incisions         KO                        "

## 2024-05-29 ENCOUNTER — OFFICE VISIT (OUTPATIENT)
Dept: PHYSICAL THERAPY | Facility: MEDICAL CENTER | Age: 34
End: 2024-05-29
Payer: COMMERCIAL

## 2024-05-29 DIAGNOSIS — M22.01 RECURRENT DISLOCATION OF RIGHT PATELLA: Primary | ICD-10-CM

## 2024-05-29 DIAGNOSIS — M25.361 PATELLAR INSTABILITY OF RIGHT KNEE: ICD-10-CM

## 2024-05-29 DIAGNOSIS — S83.31XD TEAR OF ARTICULAR CARTILAGE OF KNEE AS CURRENT INJURY, RIGHT, SUBSEQUENT ENCOUNTER: ICD-10-CM

## 2024-05-29 PROCEDURE — 97110 THERAPEUTIC EXERCISES: CPT | Performed by: PHYSICAL THERAPIST

## 2024-05-29 PROCEDURE — 97112 NEUROMUSCULAR REEDUCATION: CPT | Performed by: PHYSICAL THERAPIST

## 2024-05-29 NOTE — PROGRESS NOTES
"Daily Note     Today's date: 2024  Patient name: Cinthia Ortiz  : 1990  MRN: [id number]  Referring provider: Caity Parks,*  Dx:   Encounter Diagnosis     ICD-10-CM    1. Recurrent dislocation of right patella  M22.01       2. Tear of articular cartilage of knee as current injury, right, subsequent encounter  S83.31XD       3. Patellar instability of right knee  M25.361             Start Time: 0700  Stop Time: 0815  Total time in clinic (min): 75 minutes    Subjective: Cinthia reports that she is doing pretty good today, knee has minor swelling this morning.          Objective: See treatment diary below      Assessment: Tolerated treatment well. Cinthia demonstrated fatigue post treatment, exhibited good technique with therapeutic exercises, and would benefit from continued PT. Cinthia remains to have difficulty with SLR flexion due to quadriceps weakness. Added foam pad for Cinthia to stand on during SLS with alphabet to increase the challenge, tolerated well. Continue to work on progressing Cinthia's LE strength, focusing on quadriceps.       Plan: Continue per plan of care.      Precautions: s/p OATS procedure and MPFL reconstruction R knee       Ther Ex 4/8 4/11 4/15 4/18 4/22 4/25 4/29 5/2 5/6 5/17 5/23 5/29   Heel slides   x30 x30 x30 x30 x30 x30 x30 x30 x30 x30 x30 x30   QS 5\"  x50 5\"  x50 5\"  x50 5\"  x50 5\"  x50 5\"  x50 5\"  x50 5\"  x30 5\"  x30 5\"  x30 5\" 5\" x30   HS str 3x30\" 3x30\" 3x30\" 3x30\" 3x30\" 3x30\" 3x30\" 3x30\" 3x30\" 3x30\" 3x30\" 3x30\"   Gastroc str 3x30\" 3x30\" 3x30\" 3x30\" 3x30\" 3x30\" 3x30\" 3x30\" 3x30\" 3x30\" 3x30\" 3x30\"   Table knee flexion  x30 x30 x30 x30 x30 x30 x30 x30 x30 x30 x30 x30   SLR flexion  3x12 3x12 3x15 1#  3x10 1#  3x12 2#  3x10 2#  3x12 2#  3x15 2#  3x15 2#  3x15 2# 3x15 2# 3x15   SLR abd, add, ext 3x12 3x12 3x15 1#  3x10 1#  3x12 2#  3x10 2#  3x12 2#  3x15 2#  3x15 3#  3x10 3# 3x10 3# 3x10   Standing hip flexion 3x10 3x12 3x15 1#  3x10 1#  3x12 2#  3x19 2#  3x12 2#  3x15 " "2#  3x15 3#  3x10 3# 3x10 3# 3x10   Bike 10 min 10 min 10 min 10 min 10 min 10 min 10 min 10 min 10 min 10 min 10 min 10 min   [place] TKE 5\"  3x12 5\"  3x12 5\"  3x15 5#  3x10 5#  3x12 5#  3x15 5#  3x15 10#  3x15 10#  3x15 10#  3x15 10#  3x15 10# 3x15   Prone knee flexion  3x10 2x10  x10  Stand 1#  Stand  3x10 1#  Stand  3x10 Stand  2#  3x10 Stand  2#  3x12 Stand  2#  3x15 Stand  2#  3x15 Stand  3#  3x10 Stand  3#  3x10 Stand 3# 3x10   HR's  x30 x30 x30 x30 x30 x30 x30 x30 x30 x30 x30   Step ups    L2  x30 And  Overs  L2  x30 And overs  L2  x30 And overs   L2  x30 And  Overs  L2  x30 And overs  L2  x30 And  Overs  L2  x30 and  Overs  L2 And overs L2   SLS      3x30\" W/alpha  3x W/  Alpha  3x W/alpha  3x W/  Alpha  3x W/  Alpha  3x W/ alpha on foam 3x   LP      80#  3x12 80#  3x15 90#  3x10 90#  3x12 110#  3x10 110# 3x10 110# 3x12   Wall slides        3x10 3x15 3x15 3x15 3x15                                                                                                                           Gait Training               Brief                              Modalities         5/6      MH PRN              K-tape to incisions         KO                         "

## 2024-06-03 ENCOUNTER — OFFICE VISIT (OUTPATIENT)
Dept: PHYSICAL THERAPY | Facility: MEDICAL CENTER | Age: 34
End: 2024-06-03
Payer: COMMERCIAL

## 2024-06-03 DIAGNOSIS — M25.361 PATELLAR INSTABILITY OF RIGHT KNEE: ICD-10-CM

## 2024-06-03 DIAGNOSIS — M22.01 RECURRENT DISLOCATION OF RIGHT PATELLA: Primary | ICD-10-CM

## 2024-06-03 DIAGNOSIS — S83.31XD TEAR OF ARTICULAR CARTILAGE OF KNEE AS CURRENT INJURY, RIGHT, SUBSEQUENT ENCOUNTER: ICD-10-CM

## 2024-06-03 PROCEDURE — 97110 THERAPEUTIC EXERCISES: CPT

## 2024-06-03 PROCEDURE — 97112 NEUROMUSCULAR REEDUCATION: CPT

## 2024-06-03 NOTE — PROGRESS NOTES
"Daily Note     Today's date: 6/3/2024  Patient name: Cinthia Ortiz  : 1990  MRN: [id number]  Referring provider: Caity Parks,*  Dx:   Encounter Diagnosis     ICD-10-CM    1. Recurrent dislocation of right patella  M22.01       2. Tear of articular cartilage of knee as current injury, right, subsequent encounter  S83.31XD       3. Patellar instability of right knee  M25.361                        Subjective: Cinthia reports that she tripped while cutting grass over the weekend but her knee held up. Notes that she continues to work on her strength.       Objective: See treatment diary below      Assessment: Tolerated treatment well. Cinthia demonstrated fatigue post treatment, exhibited good technique with therapeutic exercises, and would benefit from continued PT. Cinthia continues to demonstrate quad weakness but is making slight improvements in strength as charted below, especially with SLR's.       Plan: Continue per plan of care.      Precautions: s/p OATS procedure and MPFL reconstruction R knee       Ther Ex 4/29 5/2 5/6 5/17 5/23 5/29 6/3   Heel slides x30 x30 x30 x30 x30 x30 x30   QS 5\"  x50 5\"  x30 5\"  x30 5\"  x30 5\" 5\" x30 5\"x30   HS str 3x30\" 3x30\" 3x30\" 3x30\" 3x30\" 3x30\" 3x30\"   Gastroc str 3x30\" 3x30\" 3x30\" 3x30\" 3x30\" 3x30\" 3x30\"   Table knee flexion  x30 x30 x30 x30 x30 x30 x30   SLR flexion  2#  3x12 2#  3x15 2#  3x15 2#  3x15 2# 3x15 2# 3x15 3# 3x10   SLR abd, add, ext 2#  3x12 2#  3x15 2#  3x15 3#  3x10 3# 3x10 3# 3x10 3# 3x15   Standing hip flexion 2#  3x12 2#  3x15 2#  3x15 3#  3x10 3# 3x10 3# 3x10 3# 3x15   Bike 10 min 10 min 10 min 10 min 10 min 10 min 10 min   Prone TKE 5#  3x15 10#  3x15 10#  3x15 10#  3x15 10#  3x15 10# 3x15 10# 3x15   Prone knee flexion Stand  2#  3x12 Stand  2#  3x15 Stand  2#  3x15 Stand  3#  3x10 Stand  3#  3x10 Stand 3# 3x10 Stand 3# 3x15   HR's x30 x30 x30 x30 x30 x30 x30   Step ups And overs   L2  x30 And  Overs  L2  x30 And overs  L2  x30 " And  Overs  L2  x30 and  Overs  L2 And overs L2 And overs L2   SLS W/alpha  3x W/  Alpha  3x W/alpha  3x W/  Alpha  3x W/  Alpha  3x W/ alpha on foam 3x DB toss foam 10 # 2'   LP 80#  3x15 90#  3x10 90#  3x12 110#  3x10 110# 3x10 110# 3x12 115# 3x15   Wall slides  3x10 3x15 3x15 3x15 3x15 3x15                                                                                   Gait Training          Brief                    Modalities   5/6       MH          K-tape to incisions   KO

## 2024-06-15 DIAGNOSIS — E53.8 LOW SERUM VITAMIN B12: ICD-10-CM

## 2024-06-15 DIAGNOSIS — B02.29 POST HERPETIC NEURALGIA: ICD-10-CM

## 2024-06-16 RX ORDER — CYANOCOBALAMIN 1000 UG/ML
INJECTION, SOLUTION INTRAMUSCULAR; SUBCUTANEOUS
Qty: 9 ML | Refills: 0 | Status: SHIPPED | OUTPATIENT
Start: 2024-06-16

## 2024-06-20 ENCOUNTER — OFFICE VISIT (OUTPATIENT)
Dept: PHYSICAL THERAPY | Facility: MEDICAL CENTER | Age: 34
End: 2024-06-20
Payer: COMMERCIAL

## 2024-06-20 DIAGNOSIS — S83.31XD TEAR OF ARTICULAR CARTILAGE OF KNEE AS CURRENT INJURY, RIGHT, SUBSEQUENT ENCOUNTER: ICD-10-CM

## 2024-06-20 DIAGNOSIS — M25.361 PATELLAR INSTABILITY OF RIGHT KNEE: ICD-10-CM

## 2024-06-20 DIAGNOSIS — M22.01 RECURRENT DISLOCATION OF RIGHT PATELLA: Primary | ICD-10-CM

## 2024-06-20 PROCEDURE — 97112 NEUROMUSCULAR REEDUCATION: CPT | Performed by: PHYSICAL THERAPIST

## 2024-06-20 PROCEDURE — 97110 THERAPEUTIC EXERCISES: CPT | Performed by: PHYSICAL THERAPIST

## 2024-06-20 NOTE — PROGRESS NOTES
"Daily Note     Today's date: 2024  Patient name: Cinthia Ortiz  : 1990  MRN: [id number]  Referring provider: Caity Parks,*  Dx:   Encounter Diagnosis     ICD-10-CM    1. Recurrent dislocation of right patella  M22.01       2. Tear of articular cartilage of knee as current injury, right, subsequent encounter  S83.31XD       3. Patellar instability of right knee  M25.361                      Subjective: Pt is generally doing well.  Her biggest complaint at work is an inability to fully squat at bedside.      Objective: See treatment diary below      Assessment: Tolerated treatment well. Patient demonstrated fatigue post treatment, exhibited good technique with therapeutic exercises, and would benefit from continued PT      Plan: Continue per plan of care.      Precautions: s/p OATS procedure and MPFL reconstruction R knee       Ther Ex 4/29 5/2 5/6 5/17 5/23 5/29 6/3 6/20   Heel slides x30 x30 x30 x30 x30 x30 x30 x30   QS 5\"  x50 5\"  x30 5\"  x30 5\"  x30 5\" 5\" x30 5\"x30 5\"  x30   HS str 3x30\" 3x30\" 3x30\" 3x30\" 3x30\" 3x30\" 3x30\" 3x30\"   Gastroc str 3x30\" 3x30\" 3x30\" 3x30\" 3x30\" 3x30\" 3x30\" 3x30\"   Table knee flexion  x30 x30 x30 x30 x30 x30 x30 D/C   SLR flexion  2#  3x12 2#  3x15 2#  3x15 2#  3x15 2# 3x15 2# 3x15 3# 3x10 3#  3x15   SLR abd, add, ext 2#  3x12 2#  3x15 2#  3x15 3#  3x10 3# 3x10 3# 3x10 3# 3x15 3#  3x15   Standing hip flexion 2#  3x12 2#  3x15 2#  3x15 3#  3x10 3# 3x10 3# 3x10 3# 3x15    Bike 10 min 10 min 10 min 10 min 10 min 10 min 10 min 10'   Prone TKE 5#  3x15 10#  3x15 10#  3x15 10#  3x15 10#  3x15 10# 3x15 10# 3x15 kecia   Prone knee flexion Stand  2#  3x12 Stand  2#  3x15 Stand  2#  3x15 Stand  3#  3x10 Stand  3#  3x10 Stand 3# 3x10 Stand 3# 3x15 Stand  3#  3x15   HR's x30 x30 x30 x30 x30 x30 x30 x30   Step ups And overs   L2  x30 And  Overs  L2  x30 And overs  L2  x30 And  Overs  L2  x30 and  Overs  L2 And overs L2 And overs L2 L2  Step downs  3x10   SLS W/alpha  3x " W/  Alpha  3x W/alpha  3x W/  Alpha  3x W/  Alpha  3x W/ alpha on foam 3x DB toss foam 10 # 2' Alpha  3#  3x   LP 80#  3x15 90#  3x10 90#  3x12 110#  3x10 110# 3x10 110# 3x12 115# 3x15 120#  3x10   Wall slides  3x10 3x15 3x15 3x15 3x15 3x15 5#  DB  3x10   Squats         3x10                                                                                Gait Training           Brief                      Modalities   5/6        MH           K-tape to incisions   KO

## 2024-07-10 ENCOUNTER — OFFICE VISIT (OUTPATIENT)
Dept: OBGYN CLINIC | Facility: MEDICAL CENTER | Age: 34
End: 2024-07-10
Payer: COMMERCIAL

## 2024-07-10 ENCOUNTER — OFFICE VISIT (OUTPATIENT)
Dept: PHYSICAL THERAPY | Facility: MEDICAL CENTER | Age: 34
End: 2024-07-10
Payer: COMMERCIAL

## 2024-07-10 VITALS
WEIGHT: 158 LBS | BODY MASS INDEX: 22.12 KG/M2 | HEIGHT: 71 IN | DIASTOLIC BLOOD PRESSURE: 74 MMHG | HEART RATE: 82 BPM | SYSTOLIC BLOOD PRESSURE: 108 MMHG

## 2024-07-10 DIAGNOSIS — M22.01 RECURRENT DISLOCATION OF RIGHT PATELLA: Primary | ICD-10-CM

## 2024-07-10 DIAGNOSIS — S83.31XD TEAR OF ARTICULAR CARTILAGE OF KNEE AS CURRENT INJURY, RIGHT, SUBSEQUENT ENCOUNTER: ICD-10-CM

## 2024-07-10 DIAGNOSIS — Z98.890 S/P ARTHROSCOPIC SURGERY OF RIGHT KNEE: Primary | ICD-10-CM

## 2024-07-10 DIAGNOSIS — M25.361 PATELLAR INSTABILITY OF RIGHT KNEE: ICD-10-CM

## 2024-07-10 PROCEDURE — 99213 OFFICE O/P EST LOW 20 MIN: CPT | Performed by: ORTHOPAEDIC SURGERY

## 2024-07-10 PROCEDURE — 97110 THERAPEUTIC EXERCISES: CPT

## 2024-07-10 PROCEDURE — 97112 NEUROMUSCULAR REEDUCATION: CPT

## 2024-07-10 NOTE — PROGRESS NOTES
"Daily Note     Today's date: 7/10/2024  Patient name: Cinthia Ortiz  : 1990  MRN: [id number]  Referring provider: Caity Parks,*  Dx:   Encounter Diagnosis     ICD-10-CM    1. Recurrent dislocation of right patella  M22.01       2. Tear of articular cartilage of knee as current injury, right, subsequent encounter  S83.31XD       3. Patellar instability of right knee  M25.361                      Subjective: Pt reports that her knee felt good while on vacation and still feels good and more stable.       Objective: See treatment diary below      Assessment: Tolerated treatment well. Patient demonstrated fatigue post treatment, exhibited good technique with therapeutic exercises, and would benefit from continued PT  Pt is making steady progress and demonstrates improved quad strength and mobility.       Plan: Continue per plan of care.      Precautions: s/p OATS procedure and MPFL reconstruction R knee       Ther Ex 7/10                     QS 5\"  x50          HS str 3x30\"          Gastroc str 3x30\"          Quad str 3x30\"          SLR flexion  4#  3x10          SLR abd, add, ext 4#  3x10                     Bike 10 min          [place]  TKE 20#  3x10          Standing knee flexion 4#  3x10          HR's x30          Step downs L2  3x10          SLS AE  W/  Alpha  3x          #  3x15          Wall slides 10#  DB  3x10          Squats  3x10                                                                                                                        Modalities                                                        "

## 2024-07-10 NOTE — PROGRESS NOTES
Knee Post Operative Visit     Assesment:     33 y.o. female 17 weeks s/p right knee open autologous chondrocyte implantation and MPFL reconstruction with gracilis allograft . DOS: February 29, 2024.  She is doing very well and we will continue physical therapy as per protocol.  She may continue to work with a 20 pound lifting restriction.  A new note was facilitated for her today.  She is encouraged to continue working on her range of motion as well as swelling.  I did note if she has persistent swelling of the knee a joint aspiration could be considered in the future.  I do not believe this is indicated at this time.  I would like her to follow-up with me in 6 to 8 weeks for repeat clinical evaluation.    Plan:    Post-Operative treatment:    Ice to knee for 20 minutes at least 1-2 times daily.  PT for ROM/strengthening to knee, hip and core.  OTC NSAIDS prn for pain.  Tylenol for pain.      Chief Complaint   Patient presents with    Right Knee - Post-op, Follow-up       History of Present Illness:    The patient is a 33 y.o. female who is being evaluated post operatively 17 weeks status post open NGA implantation of the patella and MPFL reconstruction with gracilis allograft.  She states that she is doing very well and has no bouts of recurrent patellar instability.  She also notes that the pain posterior to her patella is also resolved.  She does experience mild soreness at the lateral aspect.  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  She notes a little bit of swelling at the end of her day.  However, physical therapy is progressing as expected and is able to perform a straight leg raise and has excellent range of motion.  Overall she is quite happy with her progress up to this point.     She is doing well.  Walking and knee feels stable.  Less pain than prior to surgery.        I have reviewed the past medical, surgical, social and family history, medications and allergies as  "documented in the EMR.    Review of systems: ROS is negative other than that noted in the HPI.  Constitutional: Negative for fatigue and fever.        Physical Exam:    Blood pressure 108/74, pulse 82, height 5' 11\" (1.803 m), weight 71.7 kg (158 lb), not currently breastfeeding.    General/Constitutional: NAD, well developed, well nourished  HENT: Normocephalic, atraumatic  CV: Intact distal pulses, regular rate  Resp: No respiratory distress or labored breathing  Lymphatic: No lymphadenopathy palpated  Neuro: Alert and Oriented x 3, no focal deficits  Psych: Normal mood, normal affect, normal judgement, normal behavior  Skin: Warm, dry, no rashes, no erythema      Knee Exam (focused):                  RIGHT LEFT   ROM:   0-110 0-130   Palpation: Effusion mild negative     MJL tenderness Negative Negative     LJL tenderness Negative Negative   Instability: Varus stable stable     Valgus stable stable   Special Tests: Lachman Negative Negative     Posterior drawer Negative Negative     Anterior drawer Negative Negative     Pivot shift not tested not tested     Dial not tested not tested   Patella: Palpation lateral facet ttp no tenderness     Mobility 1/4 1/4     Apprehension Negative Negative   Other: Single leg 1/4 squat not tested not tested      Incisions show no erythema, no drainage    LE NV Exam: +2 DP/PT pulses bilaterally  Sensation intact to light touch L2-S1 bilaterally     Bilateral hip ROM demonstrates no pain actively or passively    No calf tenderness to palpation bilaterally    Scribe Attestation      I,:   am acting as a scribe while in the presence of the attending physician.:       I,:   personally performed the services described in this documentation    as scribed in my presence.:            "

## 2024-07-17 ENCOUNTER — APPOINTMENT (OUTPATIENT)
Dept: PHYSICAL THERAPY | Facility: MEDICAL CENTER | Age: 34
End: 2024-07-17
Payer: COMMERCIAL

## 2024-07-17 ENCOUNTER — CONSULT (OUTPATIENT)
Age: 34
End: 2024-07-17
Payer: COMMERCIAL

## 2024-07-17 VITALS
WEIGHT: 157.2 LBS | HEART RATE: 85 BPM | OXYGEN SATURATION: 97 % | HEIGHT: 70 IN | SYSTOLIC BLOOD PRESSURE: 112 MMHG | DIASTOLIC BLOOD PRESSURE: 60 MMHG | TEMPERATURE: 97.6 F | BODY MASS INDEX: 22.5 KG/M2

## 2024-07-17 DIAGNOSIS — M35.7 HYPERMOBILITY SYNDROME: Primary | ICD-10-CM

## 2024-07-17 DIAGNOSIS — R29.2 HYPER REFLEXIA: ICD-10-CM

## 2024-07-17 PROCEDURE — 99244 OFF/OP CNSLTJ NEW/EST MOD 40: CPT | Performed by: PHYSICIAN ASSISTANT

## 2024-07-17 NOTE — ASSESSMENT & PLAN NOTE
History of hypermobility.  She is not currently having any significant joint pain or myofascial pain related to her hypermobility.  She does have a history of patellar instability and underwent right knee surgery earlier this year.  Postoperatively she has done very well and is not having any pain at this time.  She does have some mild autonomic type symptoms however they are very manageable.  She is diligent with staying hydrated and uses electrolytes in her water as well which I encouraged her to continue doing.  She does have a history of a cardiac murmur as a child however she had an echocardiogram done in 2022 which was essentially unremarkable.  At this time as she is not having any significant joint pain no specific treatment is needed.  She did have lab work done earlier this year including an DIANNE which was negative along with normal inflammatory markers.  We did discuss the potential for genetic testing if she would like to pursue this however she most likely just has joint hypermobility.  We also discussed if she felt her autonomic symptoms worsen we could consider an evaluation with cardiology to evaluate for POTS.  We can follow-up as needed from a rheumatology perspective.

## 2024-07-22 ENCOUNTER — OFFICE VISIT (OUTPATIENT)
Dept: PHYSICAL THERAPY | Facility: MEDICAL CENTER | Age: 34
End: 2024-07-22
Payer: COMMERCIAL

## 2024-07-22 DIAGNOSIS — M25.361 PATELLAR INSTABILITY OF RIGHT KNEE: ICD-10-CM

## 2024-07-22 DIAGNOSIS — M22.01 RECURRENT DISLOCATION OF RIGHT PATELLA: Primary | ICD-10-CM

## 2024-07-22 DIAGNOSIS — S83.31XD TEAR OF ARTICULAR CARTILAGE OF KNEE AS CURRENT INJURY, RIGHT, SUBSEQUENT ENCOUNTER: ICD-10-CM

## 2024-07-22 PROCEDURE — 97112 NEUROMUSCULAR REEDUCATION: CPT | Performed by: PHYSICAL THERAPIST

## 2024-07-22 PROCEDURE — 97110 THERAPEUTIC EXERCISES: CPT | Performed by: PHYSICAL THERAPIST

## 2024-07-22 NOTE — PROGRESS NOTES
"Daily Note     Today's date: 2024  Patient name: Cinthia Ortiz  : 1990  MRN: [id number]  Referring provider: Caity Parks,*  Dx:   Encounter Diagnosis     ICD-10-CM    1. Recurrent dislocation of right patella  M22.01       2. Tear of articular cartilage of knee as current injury, right, subsequent encounter  S83.31XD       3. Patellar instability of right knee  M25.361                      Subjective: Pt reports that she still has some weakness with certain activities, but she is generally doing well and progressing.        Objective: See treatment diary below      Assessment: Tolerated treatment well. Patient demonstrated fatigue post treatment, exhibited good technique with therapeutic exercises, and would benefit from continued PT      Plan: Continue per plan of care.      Precautions: s/p OATS procedure and MPFL reconstruction R knee       Ther Ex 7/10 7/22                    QS 5\"  x50 5\"  x50         HS str 3x30\" 3x30\"         Gastroc str 3x30\" 3x30\"         Quad str 3x30\" 3x30\"         SLR flexion  4#  3x10 5#  3x10         SLR abd, add, ext 4#  3x10 5#  3x10                    Bike 10 min 10'         [place]  TKE 20#  3x10 20#  3x10         Standing knee flexion 4#  3x10 5#  3x10  And hip flexion          HR's x30 x30         Step downs L2  3x10 L3  3x10         SLS AE  W/  Alpha  3x AE  3x         #  3x15 120#  3x15         Wall slides 10#  DB  3x10 15#  3x10         Squats  3x10 3x10                                                                                                                       Modalities                                                          "

## 2024-07-30 ENCOUNTER — OFFICE VISIT (OUTPATIENT)
Dept: PHYSICAL THERAPY | Facility: MEDICAL CENTER | Age: 34
End: 2024-07-30
Payer: COMMERCIAL

## 2024-07-30 DIAGNOSIS — S83.31XD TEAR OF ARTICULAR CARTILAGE OF KNEE AS CURRENT INJURY, RIGHT, SUBSEQUENT ENCOUNTER: ICD-10-CM

## 2024-07-30 DIAGNOSIS — M25.361 PATELLAR INSTABILITY OF RIGHT KNEE: ICD-10-CM

## 2024-07-30 DIAGNOSIS — M22.01 RECURRENT DISLOCATION OF RIGHT PATELLA: Primary | ICD-10-CM

## 2024-07-30 PROCEDURE — 97112 NEUROMUSCULAR REEDUCATION: CPT

## 2024-07-30 PROCEDURE — 97110 THERAPEUTIC EXERCISES: CPT

## 2024-07-30 NOTE — PROGRESS NOTES
"Daily Note     Today's date: 2024  Patient name: Cinthia Ortiz  : 1990  MRN: [id number]  Referring provider: Caity Parks,*  Dx:   Encounter Diagnosis     ICD-10-CM    1. Recurrent dislocation of right patella  M22.01       2. Tear of articular cartilage of knee as current injury, right, subsequent encounter  S83.31XD       3. Patellar instability of right knee  M25.361                      Subjective: Pt reports that her knee is doing well.       Objective: See treatment diary below      Assessment: Tolerated treatment well. Patient demonstrated fatigue post treatment, exhibited good technique with therapeutic exercises, and would benefit from continued PT  Pt is making good progress towards her goals.       Plan: Continue per plan of care.      Precautions: s/p OATS procedure and MPFL reconstruction R knee       Ther Ex 7/10 7/22 7/30                   QS 5\"  x50 5\"  x50 5\"  x50        HS str 3x30\" 3x30\" 3x30\"        Gastroc str 3x30\" 3x30\" 3x30\"        Quad str 3x30\" 3x30\" 3x30\"        SLR flexion  4#  3x10 5#  3x10 5#  3x10        SLR abd, add, ext 4#  3x10 5#  3x10 5#  3x10                   Bike 10 min 10' 10'        [place]  TKE 20#  3x10 20#  3x10 20#  3x10        Standing knee flexion 4#  3x10 5#  3x10  And hip flexion  5#  3x10  And  Hip  flexion        HR's x30 x30 x30        Step downs L2  3x10 L3  3x10 L1  3x10        SLS AE  W/  Alpha  3x AE  3x AE  3x        #  3x15 120#  3x15 130#  3x10        Wall slides 10#  DB  3x10 15#  3x10 15#  3x10        Squats  3x10 3x10 3x10        Side lunges   NV                                                                                                           Modalities                                                            "

## 2024-08-07 ENCOUNTER — OFFICE VISIT (OUTPATIENT)
Dept: PHYSICAL THERAPY | Facility: MEDICAL CENTER | Age: 34
End: 2024-08-07
Payer: COMMERCIAL

## 2024-08-07 DIAGNOSIS — M25.361 PATELLAR INSTABILITY OF RIGHT KNEE: ICD-10-CM

## 2024-08-07 DIAGNOSIS — S83.31XD TEAR OF ARTICULAR CARTILAGE OF KNEE AS CURRENT INJURY, RIGHT, SUBSEQUENT ENCOUNTER: ICD-10-CM

## 2024-08-07 DIAGNOSIS — M22.01 RECURRENT DISLOCATION OF RIGHT PATELLA: Primary | ICD-10-CM

## 2024-08-07 PROCEDURE — 97110 THERAPEUTIC EXERCISES: CPT

## 2024-08-07 PROCEDURE — 97112 NEUROMUSCULAR REEDUCATION: CPT

## 2024-08-07 NOTE — PROGRESS NOTES
"Daily Note     Today's date: 2024  Patient name: Cinthia Ortiz  : 1990  MRN: [id number]  Referring provider: Caity Parks,*  Dx:   Encounter Diagnosis     ICD-10-CM    1. Recurrent dislocation of right patella  M22.01       2. Tear of articular cartilage of knee as current injury, right, subsequent encounter  S83.31XD       3. Patellar instability of right knee  M25.361                      Subjective: Pt reports that her knee is feeling pretty good.       Objective: See treatment diary below      Assessment: Tolerated treatment well. Patient demonstrated fatigue post treatment, exhibited good technique with therapeutic exercises, and would benefit from continued PT  Pt is making steady progress in her quad strength and control.  Added lateral lunges today and is to perform at home.  Pt will be on vacation next week and will resume PT upon return.       Plan: Continue per plan of care.      Precautions: s/p OATS procedure and MPFL reconstruction R knee       Ther Ex 7/10 7/22 7/30 8/7                  QS 5\"  x50 5\"  x50 5\"  x50 5\"  x50       HS str 3x30\" 3x30\" 3x30\" 3x30\"       Gastroc str 3x30\" 3x30\" 3x30\" 3x30\"       Quad str 3x30\" 3x30\" 3x30\" 3x30\"       SLR flexion  4#  3x10 5#  3x10 5#  3x10 5#  3x12       SLR abd, add, ext 4#  3x10 5#  3x10 5#  3x10 5#  3x12                  Bike 10 min 10' 10' 10'       [place]  TKE 20#  3x10 20#  3x10 20#  3x10 20#  3x12       Standing knee flexion 4#  3x10 5#  3x10  And hip flexion  5#  3x10  And  Hip  flexion 5#  3x12  And  Hip  flexion       HR's x30 x30 x30 x30       Step downs L2  3x10 L3  3x10 L1  3x10 L1  3x10       SLS AE  W/  Alpha  3x AE  3x AE  3x AE  3x       #  3x15 120#  3x15 130#  3x10 130#  3x12       Wall slides 10#  DB  3x10 15#  3x10 15#  3x10 15#  3x12       Squats  3x10 3x10 3x10 3x10       Side lunges   NV x10                                                                                                          Modalities      "

## 2024-08-22 ENCOUNTER — OFFICE VISIT (OUTPATIENT)
Dept: PHYSICAL THERAPY | Facility: MEDICAL CENTER | Age: 34
End: 2024-08-22
Payer: COMMERCIAL

## 2024-08-22 DIAGNOSIS — M22.01 RECURRENT DISLOCATION OF RIGHT PATELLA: Primary | ICD-10-CM

## 2024-08-22 DIAGNOSIS — S83.31XD TEAR OF ARTICULAR CARTILAGE OF KNEE AS CURRENT INJURY, RIGHT, SUBSEQUENT ENCOUNTER: ICD-10-CM

## 2024-08-22 DIAGNOSIS — M25.361 PATELLAR INSTABILITY OF RIGHT KNEE: ICD-10-CM

## 2024-08-22 PROCEDURE — 97112 NEUROMUSCULAR REEDUCATION: CPT

## 2024-08-22 PROCEDURE — 97110 THERAPEUTIC EXERCISES: CPT

## 2024-08-22 NOTE — PROGRESS NOTES
"Daily Note     Today's date: 2024  Patient name: Cinthia Ortiz  : 1990  MRN: [id number]  Referring provider: Caity Parks,*  Dx:   Encounter Diagnosis     ICD-10-CM    1. Recurrent dislocation of right patella  M22.01       2. Tear of articular cartilage of knee as current injury, right, subsequent encounter  S83.31XD       3. Patellar instability of right knee  M25.361                      Subjective: Pt reports that her knee is feeling good, but is still lacking in strength.       Objective: See treatment diary below      Assessment: Tolerated treatment well. Patient demonstrated fatigue post treatment, exhibited good technique with therapeutic exercises, and would benefit from continued PT  Lateral lunges are challenging for pt due to lack of strength and tightness on the return from lateral movement.       Plan: Continue per plan of care.      Precautions: s/p OATS procedure and MPFL reconstruction R knee       Ther Ex 7/10 7/22 7/30 8/7 8/22                 QS 5\"  x50 5\"  x50 5\"  x50 5\"  x50 5\"  x50      HS str 3x30\" 3x30\" 3x30\" 3x30\" 3x30\"      Gastroc str 3x30\" 3x30\" 3x30\" 3x30\" 3x30\"      Quad str 3x30\" 3x30\" 3x30\" 3x30\" 3x30\"      SLR flexion  4#  3x10 5#  3x10 5#  3x10 5#  3x12 5#  3x12      SLR abd, add, ext 4#  3x10 5#  3x10 5#  3x10 5#  3x12 5#  3x12                 Bike 10 min 10' 10' 10' 10'      [place]  TKE 20#  3x10 20#  3x10 20#  3x10 20#  3x12 20#  3x12      Standing knee flexion 4#  3x10 5#  3x10  And hip flexion  5#  3x10  And  Hip  flexion 5#  3x12  And  Hip  flexion 5#  3x12  And  Hip  flexion      HR's x30 x30 x30 x30 x30      Step downs L2  3x10 L3  3x10 L1  3x10 L1  3x10 L1  3x10      SLS AE  W/  Alpha  3x AE  3x AE  3x AE  3x AE  3x      #  3x15 120#  3x15 130#  3x10 130#  3x12 130#  3x12      Wall slides 10#  DB  3x10 15#  3x10 15#  3x10 15#  3x12 15#  3x12      Squats  3x10 3x10 3x10 3x10 3x10      Side lunges   NV x10 3x10                                     "                                                                     Modalities

## 2024-09-04 ENCOUNTER — OFFICE VISIT (OUTPATIENT)
Dept: OBGYN CLINIC | Facility: MEDICAL CENTER | Age: 34
End: 2024-09-04
Payer: COMMERCIAL

## 2024-09-04 VITALS
BODY MASS INDEX: 23.19 KG/M2 | HEART RATE: 97 BPM | WEIGHT: 162 LBS | HEIGHT: 70 IN | SYSTOLIC BLOOD PRESSURE: 111 MMHG | DIASTOLIC BLOOD PRESSURE: 67 MMHG

## 2024-09-04 DIAGNOSIS — S83.31XS: Primary | ICD-10-CM

## 2024-09-04 DIAGNOSIS — M22.01 RECURRENT DISLOCATION OF RIGHT PATELLA: ICD-10-CM

## 2024-09-04 DIAGNOSIS — Z98.890 S/P RIGHT KNEE SURGERY: ICD-10-CM

## 2024-09-04 PROCEDURE — 99213 OFFICE O/P EST LOW 20 MIN: CPT | Performed by: ORTHOPAEDIC SURGERY

## 2024-09-04 NOTE — PROGRESS NOTES
Knee Post Operative Visit     Assesment:     34 y.o. female 6 months s/p right knee open ACI of the patella with MPFL reconstruction using allograft, DOS 02/29/2024.    Plan:    Post-Operative treatment:    Continue PT per protocol. Work on quad strengthening.  Supplement PT with HEP.  Recent PT notes reviewed.  Thigh high compression stockings as needed while working.  NO ropes course. Use judgement for zip lining. No heavy impact, cutting, jumping, pivoting.  May climb ladder for hunting season two months from now. No jumping.    Imaging:    No imaging was available for review today.    Weight bearing:  as tolerated     ROM:  Full    Brace:  No brace needed    DVT Prophylaxis:  Ambulation    Follow up:   3 months    Patient was advised that if they have any fevers, chills, chest pain, shortness of breath, redness or drainage from the incision, please let our office know immediately.          Chief Complaint   Patient presents with    Right Knee - Follow-up       History of Present Illness:    The patient is a 34 y.o. female who is being evaluated post operatively 6 months s/p right knee ACI of the patella with MPFL reconstruction using allograft, DOS 02/29/2024. She denies any episodes of patellar instability since her previous visit. She reports being overall pain free at this time. She is attending PT once per week when able, she missed last week due to vacation. She supplements PT with HEP at the gym. She notes anterior knee pain with step downs and lateral lunges. She reports quad atrophy and weakness compared to the contralateral side. 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 She is wearing 20-30 mmHg thigh high compression stockings while working which help her swelling. She is ambulating without crutches or TROM. Overall, she is very happy with her outcomes of her right at this time.     She  "does note her left knee feels loose, she has one prior dislocation event 5 years ago he self reduced pushing her patella back, but no recent patellar dislocations.    The patient denies any fevers, chills, calf pain, chest pain/shortness of breath, redness or drainage from the incision.    I have reviewed the past medical, surgical, social and family history, medications and allergies as documented in the EMR.    Review of systems: ROS is negative other than that noted in the HPI.  Constitutional: Negative for fatigue and fever.        Physical Exam:    Blood pressure 111/67, pulse 97, height 5' 10\" (1.778 m), weight 73.5 kg (162 lb), not currently breastfeeding.    General/Constitutional: NAD, well developed, well nourished  HENT: Normocephalic, atraumatic  CV: Intact distal pulses, regular rate  Resp: No respiratory distress or labored breathing  Lymphatic: No lymphadenopathy palpated  Neuro: Alert and Oriented x 3, no focal deficits  Psych: Normal mood, normal affect, normal judgement, normal behavior  Skin: Warm, dry, no rashes, no erythema      Knee Exam (focused):                  RIGHT LEFT   ROM:   0-130 0-130   Palpation: Effusion negative negative     MJL tenderness Negative Negative     LJL tenderness Negative Negative   Instability: Varus stable stable     Valgus stable stable   Special Tests: Lachman Negative Negative     Posterior drawer Negative Negative     Anterior drawer Negative Negative     Pivot shift not tested not tested     Dial not tested not tested   Patella: Palpation no tenderness no tenderness     Mobility 1/4 with firm endpoint 1/4     Apprehension Negative Negative   Other: Single leg 1/4 squat     not tested    Expected quad atrophy  Able to SLR without lag  Mild crepitus with knee extension not tested      Incisions show no erythema, no drainage    LE NV Exam: +2 DP/PT pulses bilaterally  Sensation intact to light touch L2-S1 bilaterally     Bilateral hip ROM demonstrates no pain " actively or passively    No calf tenderness to palpation bilaterally    Scribe Attestation      I,:  Melina Araujo am acting as a scribe while in the presence of the attending physician.:       I,:  Johnathon Clark, DO personally performed the services described in this documentation    as scribed in my presence.:

## 2024-09-19 ENCOUNTER — OFFICE VISIT (OUTPATIENT)
Dept: PHYSICAL THERAPY | Facility: MEDICAL CENTER | Age: 34
End: 2024-09-19
Payer: COMMERCIAL

## 2024-09-19 DIAGNOSIS — M22.01 RECURRENT DISLOCATION OF RIGHT PATELLA: Primary | ICD-10-CM

## 2024-09-19 DIAGNOSIS — M25.361 PATELLAR INSTABILITY OF RIGHT KNEE: ICD-10-CM

## 2024-09-19 DIAGNOSIS — S83.31XD TEAR OF ARTICULAR CARTILAGE OF KNEE AS CURRENT INJURY, RIGHT, SUBSEQUENT ENCOUNTER: ICD-10-CM

## 2024-09-19 PROCEDURE — 97110 THERAPEUTIC EXERCISES: CPT | Performed by: PHYSICAL THERAPIST

## 2024-09-19 PROCEDURE — 97112 NEUROMUSCULAR REEDUCATION: CPT | Performed by: PHYSICAL THERAPIST

## 2024-09-19 NOTE — PROGRESS NOTES
"Daily Note     Today's date: 2024  Patient name: Cinthia Ortiz  : 1990  MRN: [id number]  Referring provider: Caity Parks,*  Dx:   Encounter Diagnosis     ICD-10-CM    1. Recurrent dislocation of right patella  M22.01       2. Tear of articular cartilage of knee as current injury, right, subsequent encounter  S83.31XD       3. Patellar instability of right knee  M25.361                      Subjective: Pt reports that the crunchy feeling she was having in her knee is back.       Objective: See treatment diary below    MMT: 4/5 in all planes R hip, R knee ext is 4+/5 R knee flexion is 4/5      Assessment: Tolerated treatment well. Patient demonstrated fatigue post treatment, exhibited good technique with therapeutic exercises, and would benefit from continued PT.  Pt must continue to strengthen her R LE.      Plan: Continue per plan of care.      Precautions: s/p OATS procedure and MPFL reconstruction R knee       Ther Ex 7/10 7/22 7/30 8/7 8/22 9/19                QS 5\"  x50 5\"  x50 5\"  x50 5\"  x50 5\"  x50 5\"  x50     HS str 3x30\" 3x30\" 3x30\" 3x30\" 3x30\" NP     Gastroc str 3x30\" 3x30\" 3x30\" 3x30\" 3x30\" NP     Quad str 3x30\" 3x30\" 3x30\" 3x30\" 3x30\" NP     SLR flexion  4#  3x10 5#  3x10 5#  3x10 5#  3x12 5#  3x12 4#  3x15     SLR abd, add, ext 4#  3x10 5#  3x10 5#  3x10 5#  3x12 5#  3x12 4#  3x15                Bike 10 min 10' 10' 10' 10' 10'     [place]  TKE 20#  3x10 20#  3x10 20#  3x10 20#  3x12 20#  3x12 NP     Standing knee flexion 4#  3x10 5#  3x10  And hip flexion  5#  3x10  And  Hip  flexion 5#  3x12  And  Hip  flexion 5#  3x12  And  Hip  flexion 4#  3x15     HR's x30 x30 x30 x30 x30 NP     Step downs L2  3x10 L3  3x10 L1  3x10 L1  3x10 L1  3x10 L3  3x10     SLS AE  W/  Alpha  3x AE  3x AE  3x AE  3x AE  3x NP     #  3x15 120#  3x15 130#  3x10 130#  3x12 130#  3x12 120#  3x10     Wall slides 10#  DB  3x10 15#  3x10 15#  3x10 15#  3x12 15#  3x12 3x10     Squats  3x10 3x10 3x10 3x10 " 3x10 NP     Side lunges   NV x10 3x10 NP                                                                                                        Modalities

## 2024-09-25 ENCOUNTER — APPOINTMENT (OUTPATIENT)
Dept: PHYSICAL THERAPY | Facility: MEDICAL CENTER | Age: 34
End: 2024-09-25
Payer: COMMERCIAL

## 2024-10-03 ENCOUNTER — OFFICE VISIT (OUTPATIENT)
Dept: PHYSICAL THERAPY | Facility: MEDICAL CENTER | Age: 34
End: 2024-10-03
Payer: COMMERCIAL

## 2024-10-03 DIAGNOSIS — M25.361 PATELLAR INSTABILITY OF RIGHT KNEE: ICD-10-CM

## 2024-10-03 DIAGNOSIS — M22.01 RECURRENT DISLOCATION OF RIGHT PATELLA: Primary | ICD-10-CM

## 2024-10-03 DIAGNOSIS — S83.31XD TEAR OF ARTICULAR CARTILAGE OF KNEE AS CURRENT INJURY, RIGHT, SUBSEQUENT ENCOUNTER: ICD-10-CM

## 2024-10-03 PROCEDURE — 97110 THERAPEUTIC EXERCISES: CPT

## 2024-10-03 PROCEDURE — 97112 NEUROMUSCULAR REEDUCATION: CPT

## 2024-10-03 NOTE — PROGRESS NOTES
"Daily Note     Today's date: 10/3/2024  Patient name: Cinthia Ortiz  : 1990  MRN: [id number]  Referring provider: Caity Parks,*  Dx:   Encounter Diagnosis     ICD-10-CM    1. Recurrent dislocation of right patella  M22.01       2. Tear of articular cartilage of knee as current injury, right, subsequent encounter  S83.31XD       3. Patellar instability of right knee  M25.361                      Subjective: Pt reports that her knee is feeling stronger and can see more definition in her quad mm and less edema.       Objective: See treatment diary below      Assessment: Tolerated treatment well. Patient demonstrated fatigue post treatment, exhibited good technique with therapeutic exercises, and would benefit from continued PT      Plan: Continue per plan of care.      Precautions: s/p OATS procedure and MPFL reconstruction R knee       Ther Ex 7/10 7/22 7/30 8/7 8/22 9/19 10/3               QS 5\"  x50 5\"  x50 5\"  x50 5\"  x50 5\"  x50 5\"  x50 5\"  x50    HS str 3x30\" 3x30\" 3x30\" 3x30\" 3x30\" NP 3x30\"    Gastroc str 3x30\" 3x30\" 3x30\" 3x30\" 3x30\" NP 3x30\"    Quad str 3x30\" 3x30\" 3x30\" 3x30\" 3x30\" NP 3x30\"    SLR flexion  4#  3x10 5#  3x10 5#  3x10 5#  3x12 5#  3x12 4#  3x15 4#  3x15    SLR abd, add, ext 4#  3x10 5#  3x10 5#  3x10 5#  3x12 5#  3x12 4#  3x15 4#  3x15               Bike 10 min 10' 10' 10' 10' 10' 10'    [place]  TKE 20#  3x10 20#  3x10 20#  3x10 20#  3x12 20#  3x12 NP NP    Standing knee flexion 4#  3x10 5#  3x10  And hip flexion  5#  3x10  And  Hip  flexion 5#  3x12  And  Hip  flexion 5#  3x12  And  Hip  flexion 4#  3x15 4#  3x15    HR's x30 x30 x30 x30 x30 NP x30    Step downs L2  3x10 L3  3x10 L1  3x10 L1  3x10 L1  3x10 L3  3x10 L3  3x10    SLS AE  W/  Alpha  3x AE  3x AE  3x AE  3x AE  3x NP AE  3x    #  3x15 120#  3x15 130#  3x10 130#  3x12 130#  3x12 120#  3x10 120#  3x12    Wall slides 10#  DB  3x10 15#  3x10 15#  3x10 15#  3x12 15#  3x12 3x10 15#  3x10    Squats  3x10 3x10 3x10 " 3x10 3x10 NP NP    Side lunges   NV x10 3x10 NP 3x10                                                                                                       Modalities

## 2024-10-08 ENCOUNTER — TELEPHONE (OUTPATIENT)
Age: 34
End: 2024-10-08

## 2024-10-08 NOTE — TELEPHONE ENCOUNTER
Patient was wait listed 10/2023.    Called and spoke to patient. Scheduled for 12/18/2024 @ 9:40 am for FULL BODY with Dr. Morgan at [facility] [place]. Patient aware of address.    Patient has [employer] insurance.    Patient aware to arrive 15 minutes prior to appointment.    (Patient did have SOC on neck but has since been seen by Plastic Surgery for removal.)

## 2024-10-14 ENCOUNTER — OFFICE VISIT (OUTPATIENT)
Dept: PHYSICAL THERAPY | Facility: MEDICAL CENTER | Age: 34
End: 2024-10-14
Payer: COMMERCIAL

## 2024-10-14 DIAGNOSIS — M25.361 PATELLAR INSTABILITY OF RIGHT KNEE: ICD-10-CM

## 2024-10-14 DIAGNOSIS — M22.01 RECURRENT DISLOCATION OF RIGHT PATELLA: Primary | ICD-10-CM

## 2024-10-14 DIAGNOSIS — S83.31XD TEAR OF ARTICULAR CARTILAGE OF KNEE AS CURRENT INJURY, RIGHT, SUBSEQUENT ENCOUNTER: ICD-10-CM

## 2024-10-14 PROCEDURE — 97112 NEUROMUSCULAR REEDUCATION: CPT

## 2024-10-14 PROCEDURE — 97110 THERAPEUTIC EXERCISES: CPT

## 2024-10-14 NOTE — PROGRESS NOTES
"Daily Note     Today's date: 10/14/2024  Patient name: Cinthia Ortiz  : 1990  MRN: [id number]  Referring provider: Caity Parks,*  Dx:   Encounter Diagnosis     ICD-10-CM    1. Recurrent dislocation of right patella  M22.01       2. Tear of articular cartilage of knee as current injury, right, subsequent encounter  S83.31XD       3. Patellar instability of right knee  M25.361                      Subjective: Pt reports that she was able to do a leg workout at the gym the other day and her knee felt good, just mm soreness post.       Objective: See treatment diary below      Assessment: Tolerated treatment well. Patient demonstrated fatigue post treatment, exhibited good technique with therapeutic exercises, and would benefit from continued PT      Plan: Continue per plan of care.      Precautions: s/p OATS procedure and MPFL reconstruction R knee       Ther Ex 7/10 7/22 7/30 8/7 8/22 9/19 10/3 10/14              QS 5\"  x50 5\"  x50 5\"  x50 5\"  x50 5\"  x50 5\"  x50 5\"  x50 5\"  x50   HS str 3x30\" 3x30\" 3x30\" 3x30\" 3x30\" NP 3x30\" 3x30\"   Gastroc str 3x30\" 3x30\" 3x30\" 3x30\" 3x30\" NP 3x30\" 3x30\"   Quad str 3x30\" 3x30\" 3x30\" 3x30\" 3x30\" NP 3x30\" 3x30\"   SLR flexion  4#  3x10 5#  3x10 5#  3x10 5#  3x12 5#  3x12 4#  3x15 4#  3x15 4#  3x15   SLR abd, add, ext 4#  3x10 5#  3x10 5#  3x10 5#  3x12 5#  3x12 4#  3x15 4#  3x15 4#  3x15              Bike 10 min 10' 10' 10' 10' 10' 10 10'   [place]  TKE 20#  3x10 20#  3x10 20#  3x10 20#  3x12 20#  3x12 NP NP NP   Standing knee flexion 4#  3x10 5#  3x10  And hip flexion  5#  3x10  And  Hip  flexion 5#  3x12  And  Hip  flexion 5#  3x12  And  Hip  flexion 4#  3x15 4#  3x15 4#  3x15   HR's x30 x30 x30 x30 x30 NP x30 x30   Step downs L2  3x10 L3  3x10 L1  3x10 L1  3x10 L1  3x10 L3  3x10 L3  3x10 L3  3x10   SLS AE  W/  Alpha  3x AE  3x AE  3x AE  3x AE  3x NP AE  3x AE  3x   #  3x15 120#  3x15 130#  3x10 130#  3x12 130#  3x12 120#  3x10 120#  3x12 120#  3x15   Wall " slides 10#  DB  3x10 15#  3x10 15#  3x10 15#  3x12 15#  3x12 3x10 15#  3x10 15#  3x10   Squats  3x10 3x10 3x10 3x10 3x10 NP NP 3x10   Side lunges   NV x10 3x10 NP 3x10 3x10                                                                                                      Modalities

## 2024-10-23 ENCOUNTER — PATIENT MESSAGE (OUTPATIENT)
Dept: FAMILY MEDICINE CLINIC | Facility: CLINIC | Age: 34
End: 2024-10-23

## 2024-10-24 ENCOUNTER — APPOINTMENT (OUTPATIENT)
Dept: LAB | Facility: CLINIC | Age: 34
End: 2024-10-24

## 2024-10-24 DIAGNOSIS — Z00.6 ENCOUNTER FOR EXAMINATION FOR NORMAL COMPARISON OR CONTROL IN CLINICAL RESEARCH PROGRAM: ICD-10-CM

## 2024-10-24 PROCEDURE — 36415 COLL VENOUS BLD VENIPUNCTURE: CPT

## 2024-10-25 DIAGNOSIS — B00.50 HSV (HERPES SIMPLEX VIRUS) WITH OPHTHALMIC COMPLICATIONS: Primary | ICD-10-CM

## 2024-10-28 ENCOUNTER — TELEPHONE (OUTPATIENT)
Age: 34
End: 2024-10-28

## 2024-10-28 NOTE — TELEPHONE ENCOUNTER
ID Referral    DIANA Santana,    Thank you for referring Cinthia Ortiz,  1990, to [facility]. At this time we are going to close this referral due to:  Current treatment by her Ophthalmologist is appropriate.  Patient does not have confirmatory testing of HSV   Patient does not have more than 3 confirmed outbreak requiring episodic treatment in the past year.     Thank you again and have a good day.

## 2024-10-28 NOTE — TELEPHONE ENCOUNTER
ID Referral - DENY    See message sent to referring provider    Has diagnosis been confirmed with HSV ½ PCR? No  If NO, patient must have confirmatory testing.    Has patient had more than 3 confirmed outbreaks requiring episodic treatment in the past year? No  If NO, patient should continue episodic treatment with referring provider.    Is patient pregnant? No  If YES, schedule in first available ID physician spot as priority, if no spots open within week send to ID office for review.    If patient has confirmatory testing, and more than 3 confirmed outbreaks requiring episodic treatment in the past year  1st -  females should be referred to treatment with OBGYN  2nd - recommend follow up with the [facility], provide city/county info (more affordable and faster)  3rd -  schedule with ID in a nonpriority open spot with any provider

## 2024-10-29 ENCOUNTER — APPOINTMENT (OUTPATIENT)
Dept: LAB | Facility: MEDICAL CENTER | Age: 34
End: 2024-10-29
Payer: COMMERCIAL

## 2024-10-29 DIAGNOSIS — B00.50 HSV (HERPES SIMPLEX VIRUS) WITH OPHTHALMIC COMPLICATIONS: ICD-10-CM

## 2024-10-29 LAB
HSV2 IGG SERPL QL IA: NEGATIVE
HSV2 IGG SERPL QL IA: NEGATIVE

## 2024-10-29 PROCEDURE — 86695 HERPES SIMPLEX TYPE 1 TEST: CPT

## 2024-10-29 PROCEDURE — 86696 HERPES SIMPLEX TYPE 2 TEST: CPT

## 2024-10-30 ENCOUNTER — APPOINTMENT (OUTPATIENT)
Dept: LAB | Facility: HOSPITAL | Age: 34
End: 2024-10-30
Payer: COMMERCIAL

## 2024-10-30 DIAGNOSIS — R21 RASH: Primary | ICD-10-CM

## 2024-10-30 DIAGNOSIS — R21 RASH: ICD-10-CM

## 2024-10-30 LAB — VZV IGG SER QL IA: NORMAL

## 2024-10-30 PROCEDURE — 36415 COLL VENOUS BLD VENIPUNCTURE: CPT

## 2024-10-30 PROCEDURE — 86787 VARICELLA-ZOSTER ANTIBODY: CPT

## 2024-11-01 LAB — VZV IGM SER IA-ACNC: <0.91 INDEX (ref 0–0.9)

## 2024-11-04 LAB
APOB+LDLR+PCSK9 GENE MUT ANL BLD/T: NOT DETECTED
BRCA1+BRCA2 DEL+DUP + FULL MUT ANL BLD/T: NOT DETECTED
MLH1+MSH2+MSH6+PMS2 GN DEL+DUP+FUL M: NOT DETECTED

## 2024-12-09 ENCOUNTER — OFFICE VISIT (OUTPATIENT)
Dept: DERMATOLOGY | Facility: CLINIC | Age: 34
End: 2024-12-09
Payer: COMMERCIAL

## 2024-12-09 VITALS — BODY MASS INDEX: 24.49 KG/M2 | WEIGHT: 170.7 LBS

## 2024-12-09 DIAGNOSIS — D22.9 MULTIPLE MELANOCYTIC NEVI: Primary | ICD-10-CM

## 2024-12-09 PROCEDURE — 99202 OFFICE O/P NEW SF 15 MIN: CPT | Performed by: REGISTERED NURSE

## 2024-12-09 NOTE — PROGRESS NOTES
"[facility] Note     Patient Name: Cinthia Ortiz  Encounter Date: 12/9/24     Have you been cared for by a [facility] Dermatologist in the last 3 years and, if so, which description applies to you?    NO.   I am considered a \"new\" patient and must complete all patient intake questions. I am FEMALE/of child-bearing potential.    REVIEW OF SYSTEMS:  Have you recently had or currently have any of the following? Recent fever or chills? No  Any non-healing wound? No  Are you pregnant or planning to become pregnant? No  Are you currently or planning to be nursing or breast feeding? No   PAST MEDICAL HISTORY:  Have you personally ever had or currently have any of the following?  If \"YES,\" then please provide more detail. Skin cancer (such as Melanoma, Basal Cell Carcinoma, Squamous Cell Carcinoma?  No  Tuberculosis, HIV/AIDS, Hepatitis B or C: No  Radiation Treatment No   HISTORY OF IMMUNOSUPPRESSION:   Do you have a history of any of the following:  Systemic Immunosuppression such as Diabetes, Biologic or Immunotherapy, Chemotherapy, Organ Transplantation, Bone Marrow Transplantation or Prednsione?  No    Answering \"YES\" requires the addition of the dotphrase \"IMMUNOSUPPRESSED\" as the first diagnosis of the patient's visit.   FAMILY HISTORY:  Any \"first degree relatives\" (parent, brother, sister, or child) with the following?    Skin Cancer, Pancreatic or Other Cancer? No   PATIENT EXPERIENCE:    Do you want the Dermatologist to perform a COMPLETE skin exam today including a clinical examination under the \"bra and underwear\" areas?  Yes  If necessary, do we have your permission to call and leave a detailed message on your Preferred Phone number that includes your specific medical information?  Yes      No Known Allergies   Current Outpatient Medications:     Cyanocobalamin 1000 MCG SUBL, Place 1 tablet (1,000 mcg total) under the tongue daily, Disp: 90 tablet, Rfl: 0    Misc Natural Products (Osteo " "Bi-Flex Adv Joint Shield) TABS, Take by mouth in the morning, Disp: , Rfl:     Multiple Vitamin (MULTI-VITAMIN PO), Take by mouth in the morning, Disp: , Rfl:     ondansetron (ZOFRAN) 4 mg tablet, Take 1 tablet (4 mg total) by mouth every 8 (eight) hours as needed for nausea or vomiting, Disp: 20 tablet, Rfl: 0    SYRINGE-NEEDLE, DISP, 3 ML 25G X 1\" 3 ML MISC, Inject into a muscle every 28 days, Disp: 9 each, Rfl: 0    Ascorbic Acid (VITAMIN C PO), Take by mouth in the morning (Patient not taking: Reported on 2/21/2024), Disp: , Rfl:     Aspirin Low Dose 81 MG EC tablet, TAKE 1 TABLET BY MOUTH 2 TIMES A DAY. (Patient not taking: Reported on 9/4/2024), Disp: 60 tablet, Rfl: 1    cyanocobalamin 1,000 mcg/mL, INJECT 1000MCG INTO A MUSCLE ONCE A WEEK FOR 4 WEEKS THEN ONCE A MONTH FOR 5 MONTHS (Patient not taking: Reported on 12/9/2024), Disp: 9 mL, Rfl: 0    gabapentin (Neurontin) 300 mg capsule, Take 1 capsule (300 mg total) by mouth 3 (three) times a day (Patient not taking: Reported on 4/5/2024), Disp: 90 capsule, Rfl: 1    hydrochlorothiazide (HYDRODIURIL) 25 mg tablet, TAKE 1 TABLET (25 MG TOTAL) BY MOUTH DAILY. (Patient taking differently: Take 25 mg by mouth if needed), Disp: 30 tablet, Rfl: 5          Whom besides the patient is providing clinical information about today's encounter?   NO ADDITIONAL HISTORIAN (patient alone provided history)    Physical Exam and Assessment/Plan by Diagnosis:    MELANOCYTIC NEVI  -Relevant exam: Scattered over the trunk/extremities are homogenously pigmented brown macules and papules. ELM performed and without concerning findings.  - Exam and clinical history consistent with melanocytic nevi  - Educated on the ABCDE's of melanoma; handout provided  - Counseled to return to clinic prior to scheduled appointment should any of these lesions change or should any new lesions of concern arise  - Counseled on use of sun protection daily. Reviewed latest FDA sunscreen guidelines, " including use of broad spectrum (UVA and UVB blocking) sunscreen or sun protective clothing with SPF 30-50 every 2-3 hours and reapplied after exposure to water; use of photoprotective clothing, including a broad brim hat and UPF rated clothing if outdoors for several hours; avoid use of tanning beds as these pose significant risk for melanoma and skin cancer.    CHERRY ANGIOMAS  - Relevant exam: Scattered over the trunk/extremities are red papules  - Exam and clinical history consistent with cherry angiomas  - Educated that these are benign  - Educated that removal is considered aesthetic and would incur a fee.  - Patient does not wish to pursue removal at this time but will contact us should this change.    Scribe Attestation      I,:  Kim Lewis am acting as a scribe while in the presence of the attending physician.:       I,:  Marcus Morgan MD personally performed the services described in this documentation    as scribed in my presence.:

## 2025-01-06 ENCOUNTER — OFFICE VISIT (OUTPATIENT)
Dept: OBGYN CLINIC | Facility: MEDICAL CENTER | Age: 35
End: 2025-01-06
Payer: COMMERCIAL

## 2025-01-06 VITALS — WEIGHT: 170 LBS | BODY MASS INDEX: 24.34 KG/M2 | HEIGHT: 70 IN

## 2025-01-06 DIAGNOSIS — S83.31XS: Primary | ICD-10-CM

## 2025-01-06 PROCEDURE — 99213 OFFICE O/P EST LOW 20 MIN: CPT | Performed by: ORTHOPAEDIC SURGERY

## 2025-01-06 RX ORDER — DIFLUPREDNATE OPHTHALMIC 0.5 MG/ML
1 EMULSION OPHTHALMIC
COMMUNITY

## 2025-01-06 NOTE — PROGRESS NOTES
Knee Post Operative Visit     Assesment:     34 y.o. female 10 months s/p right knee open ACI of the patella with MPFL reconstruction using allograft, DOS 02/29/2024.    Plan:    Post-Operative treatment:    Continue PT per protocol. Work on quad strengthening.    Imaging:    No imaging was available for review today.    Weight bearing:  as tolerated     ROM:  Full    Brace:  No brace needed    DVT Prophylaxis:  Ambulation    Follow up:   3 months    Patient was advised that if they have any fevers, chills, chest pain, shortness of breath, redness or drainage from the incision, please let our office know immediately.          Chief Complaint   Patient presents with    Right Knee - Follow-up       History of Present Illness:    The patient is a 34 y.o. female who is being evaluated post operatively 10 months s/p right knee ACI of the patella with MPFL reconstruction using allograft, DOS 02/29/2024. She denies any episodes of patellar instability since her previous visit. She reports being overall pain free at this time. She is attending PT once per week when able, she missed last week due to vacation. She supplements PT with HEP at the gym. She notes anterior knee pain with step downs and lateral lunges. She reports quad atrophy and weakness compared to the contralateral side. 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 She is wearing 20-30 mmHg thigh high compression stockings while working which help her swelling. She is ambulating without crutches or TROM. Overall, she is very happy with her outcomes of her right at this time.     She does note her left knee feels loose, she has one prior dislocation event 5 years ago he self reduced pushing her patella back, but no recent patellar dislocations.    She notes no instability/  no pain.    Still notes some crepitation.      Review of systems: ROS is  "negative other than that noted in the HPI.  Constitutional: Negative for fatigue and fever.        Physical Exam:    Height 5' 10\" (1.778 m), weight 77.1 kg (170 lb), not currently breastfeeding.    General/Constitutional: NAD, well developed, well nourished  HENT: Normocephalic, atraumatic  CV: Intact distal pulses, regular rate  Resp: No respiratory distress or labored breathing  Lymphatic: No lymphadenopathy palpated  Neuro: Alert and Oriented x 3, no focal deficits  Psych: Normal mood, normal affect, normal judgement, normal behavior  Skin: Warm, dry, no rashes, no erythema      Knee Exam (focused):                  RIGHT LEFT   ROM:   0-130 0-130   Palpation: Effusion negative negative     MJL tenderness Negative Negative     LJL tenderness Negative Negative   Instability: Varus stable stable     Valgus stable stable   Special Tests: Lachman Negative Negative     Posterior drawer Negative Negative     Anterior drawer Negative Negative     Pivot shift not tested not tested     Dial not tested not tested   Patella: Palpation no tenderness no tenderness     Mobility 1/4 with firm endpoint 1/4     Apprehension Negative Negative   Other: Single leg 1/4 squat     not tested    Expected quad atrophy  Able to SLR without lag  Mild crepitus with knee extension not tested      Incisions show no erythema, no drainage    LE NV Exam: +2 DP/PT pulses bilaterally  Sensation intact to light touch L2-S1 bilaterally     Bilateral hip ROM demonstrates no pain actively or passively    No calf tenderness to palpation bilaterally    Scribe Attestation      I,:   am acting as a scribe while in the presence of the attending physician.:       I,:   personally performed the services described in this documentation    as scribed in my presence.:             "

## 2025-01-14 DIAGNOSIS — R89.9 ABNORMAL LABORATORY TEST: Primary | ICD-10-CM

## 2025-01-14 DIAGNOSIS — B02.30 HERPES ZOSTER OPHTHALMICUS OF LEFT EYE: ICD-10-CM

## 2025-01-14 RX ORDER — VALACYCLOVIR HYDROCHLORIDE 1 G/1
1000 TABLET, FILM COATED ORAL DAILY
Qty: 90 TABLET | Refills: 1 | Status: SHIPPED | OUTPATIENT
Start: 2025-01-14 | End: 2026-01-14

## 2025-01-15 ENCOUNTER — APPOINTMENT (OUTPATIENT)
Dept: LAB | Facility: CLINIC | Age: 35
End: 2025-01-15
Payer: COMMERCIAL

## 2025-01-15 DIAGNOSIS — R89.9 ABNORMAL LABORATORY TEST: ICD-10-CM

## 2025-01-15 LAB — TSH SERPL DL<=0.05 MIU/L-ACNC: 3.19 UIU/ML (ref 0.45–4.5)

## 2025-01-15 PROCEDURE — 36415 COLL VENOUS BLD VENIPUNCTURE: CPT

## 2025-01-15 PROCEDURE — 84443 ASSAY THYROID STIM HORMONE: CPT

## 2025-04-03 ENCOUNTER — APPOINTMENT (OUTPATIENT)
Dept: LAB | Facility: CLINIC | Age: 35
End: 2025-04-03
Payer: COMMERCIAL

## 2025-04-03 DIAGNOSIS — Z00.8 HEALTH EXAMINATION IN POPULATION SURVEY: ICD-10-CM

## 2025-04-03 LAB
CHOLEST SERPL-MCNC: 158 MG/DL (ref ?–200)
EST. AVERAGE GLUCOSE BLD GHB EST-MCNC: 108 MG/DL
HBA1C MFR BLD: 5.4 %
HDLC SERPL-MCNC: 61 MG/DL
LDLC SERPL CALC-MCNC: 86 MG/DL (ref 0–100)
NONHDLC SERPL-MCNC: 97 MG/DL
TRIGL SERPL-MCNC: 53 MG/DL (ref ?–150)

## 2025-04-03 PROCEDURE — 80061 LIPID PANEL: CPT

## 2025-04-03 PROCEDURE — 83036 HEMOGLOBIN GLYCOSYLATED A1C: CPT

## 2025-04-03 PROCEDURE — 36415 COLL VENOUS BLD VENIPUNCTURE: CPT

## 2025-04-06 ENCOUNTER — RESULTS FOLLOW-UP (OUTPATIENT)
Dept: FAMILY MEDICINE CLINIC | Facility: CLINIC | Age: 35
End: 2025-04-06

## 2025-05-05 DIAGNOSIS — R60.0 BILATERAL LEG EDEMA: ICD-10-CM

## 2025-05-06 ENCOUNTER — OFFICE VISIT (OUTPATIENT)
Dept: OBGYN CLINIC | Facility: MEDICAL CENTER | Age: 35
End: 2025-05-06
Payer: COMMERCIAL

## 2025-05-06 VITALS — BODY MASS INDEX: 25.2 KG/M2 | WEIGHT: 176 LBS | HEIGHT: 70 IN

## 2025-05-06 DIAGNOSIS — S83.31XS: Primary | ICD-10-CM

## 2025-05-06 DIAGNOSIS — M22.01 RECURRENT DISLOCATION OF RIGHT PATELLA: ICD-10-CM

## 2025-05-06 PROCEDURE — 99213 OFFICE O/P EST LOW 20 MIN: CPT | Performed by: ORTHOPAEDIC SURGERY

## 2025-05-06 NOTE — PROGRESS NOTES
"Knee Post Operative Visit     Assesment:     34 y.o. female 14 months s/p right knee open ACI of the patella with MPFL reconstruction using allograft, DOS 02/29/2024.    Plan:    Return to all activities with no restrictions  Follow-up as needed for either knee in the future      Chief Complaint   Patient presents with    Right Knee - Post-op       History of Present Illness:    The patient is a 34 y.o. female who is being evaluated post operatively 14 months s/p right knee ACI of the patella with MPFL reconstruction using .  Allograft.  She is doing very well.  She notes occasional crepitation but has no pain or instability of the knee.  She does note occasional instability of her left knee.  She is back to all of her activities.  No other questions or concerns at this time.    Review of systems: ROS is negative other than that noted in the HPI.  Constitutional: Negative for fatigue and fever.        Physical Exam:    Height 5' 10\" (1.778 m), weight 79.8 kg (176 lb), not currently breastfeeding.    General/Constitutional: NAD, well developed, well nourished  HENT: Normocephalic, atraumatic  CV: Intact distal pulses, regular rate  Resp: No respiratory distress or labored breathing  Lymphatic: No lymphadenopathy palpated  Neuro: Alert and Oriented x 3, no focal deficits  Psych: Normal mood, normal affect, normal judgement, normal behavior  Skin: Warm, dry, no rashes, no erythema      Knee Exam (focused):                  RIGHT LEFT   ROM:   0-130 0-130   Palpation: Effusion negative negative     MJL tenderness Negative Negative     LJL tenderness Negative Negative   Instability: Varus stable stable     Valgus stable stable   Special Tests: Lachman Negative Negative     Posterior drawer Negative Negative     Anterior drawer Negative Negative     Pivot shift not tested not tested     Dial not tested not tested   Patella: Palpation no tenderness no tenderness     Mobility 1/4 with firm endpoint 1/4     Apprehension " Negative Negative   Other: Single leg 1/4 squat     not tested    Expected quad atrophy  Able to SLR without lag  Mild crepitus with knee extension not tested      Incisions show no erythema, no drainage    LE NV Exam: +2 DP/PT pulses bilaterally  Sensation intact to light touch L2-S1 bilaterally     Bilateral hip ROM demonstrates no pain actively or passively    No calf tenderness to palpation bilaterally    Scribe Attestation      I,:   am acting as a scribe while in the presence of the attending physician.:       I,:   personally performed the services described in this documentation    as scribed in my presence.:

## 2025-05-08 ENCOUNTER — TELEPHONE (OUTPATIENT)
Dept: FAMILY MEDICINE CLINIC | Facility: CLINIC | Age: 35
End: 2025-05-08

## 2025-05-13 NOTE — PROGRESS NOTES
Name: Cinthia Ortiz      : 1990      MRN: [id number]  Encounter Provider: DIANA Trevino  Encounter Date: 2025   Encounter department: [facility]  :  Assessment & Plan  Encounter for gynecological examination (general) (routine) with abnormal findings  The patient likes current contraceptive method and desires to continue, condoms. Recommended monthly SBE and annual CBE. ASCCP guidelines reviewed and pap collected today. STI testing ordered. She is aware that condoms are recommended with all sexual contact for STI prevention. Reviewed diet/activity recommendations. Return to the office in one year for routine annual gyn exam or sooner PRN.         Women's annual routine gynecological examination    Orders:    Liquid-based pap, screening    Routine screening for STI (sexually transmitted infection)    Orders:    Hepatitis B E Antigen; Future    HIV 1/2 AG/AB w Reflex [facility] for 2 yr old and above; Future    RPR-Syphilis Screening (Total Syphilis IGG/IGM); Future    Family history of breast cancer  Pt advised to check insurance co for coverage before scheduling. She is also checking with family to determine if cousin had genetic testing done.   Orders:    Mammo screening bilateral w 3d and cad; Future    Low libido  Discussed libido and the possibility of starting Addyi. Pt will research and notify the office if she would like to start.            History of Present Illness   This patient presents for routine annual gyn exam.   Menses are regular with normal flow.   She denies breast concerns, abn discharge, pelvic pain, bowel/bladder dysfunction, depression/anxiety.   Monogamous relationship, almost a year. 100% condom use. Declines additional BC. Requests STI testing. Concerns about low libido.   Last pap 21.  Gardasil completed per pt   history of breast cancer, first cousin, age - 35           Review of Systems   Constitutional: Negative.    HENT: Negative.      Respiratory: Negative.     Cardiovascular: Negative.    Gastrointestinal: Negative.    Endocrine: Negative.    Genitourinary:  Negative for difficulty urinating, dysuria, frequency, menstrual problem, pelvic pain, urgency, vaginal bleeding, vaginal discharge and vaginal pain.   Musculoskeletal: Negative.    Skin: Negative.    Neurological: Negative.    Psychiatric/Behavioral: Negative.            Objective   There were no vitals taken for this visit.     Physical Exam  Vitals and nursing note reviewed.   Constitutional:       General: She is not in acute distress.     Appearance: She is well-developed.   HENT:      Head: Normocephalic and atraumatic.     Eyes:      Conjunctiva/sclera: Conjunctivae normal.       Cardiovascular:      Rate and Rhythm: Normal rate and regular rhythm.      Heart sounds: No murmur heard.  Pulmonary:      Effort: Pulmonary effort is normal. No respiratory distress.      Breath sounds: Normal breath sounds.   Chest:   Breasts:     Right: No swelling, bleeding, inverted nipple, mass, nipple discharge, skin change or tenderness.      Left: No swelling, bleeding, inverted nipple, mass, nipple discharge, skin change or tenderness.   Abdominal:      Palpations: Abdomen is soft.      Tenderness: There is no abdominal tenderness.   Genitourinary:     General: Normal vulva.      Exam position: Supine.      Pubic Area: No rash.       Labia:         Right: No rash, tenderness, lesion or injury.         Left: No rash, tenderness, lesion or injury.       Urethra: No urethral pain, urethral swelling or urethral lesion.      Vagina: No signs of injury and foreign body. No vaginal discharge, erythema, tenderness, bleeding or lesions.      Cervix: No cervical motion tenderness, discharge, friability, lesion, erythema, cervical bleeding or eversion.      Uterus: Not deviated, not enlarged, not fixed and not tender.       Adnexa:         Right: No mass, tenderness or fullness.          Left: No mass,  tenderness or fullness.       Musculoskeletal:         General: No swelling.      Cervical back: Neck supple.     Skin:     General: Skin is warm and dry.      Capillary Refill: Capillary refill takes less than 2 seconds.     Neurological:      Mental Status: She is alert.     Psychiatric:         Mood and Affect: Mood normal.

## 2025-05-14 ENCOUNTER — APPOINTMENT (OUTPATIENT)
Dept: LAB | Facility: HOSPITAL | Age: 35
End: 2025-05-14
Payer: COMMERCIAL

## 2025-05-14 ENCOUNTER — ANNUAL EXAM (OUTPATIENT)
Dept: GYNECOLOGY | Facility: CLINIC | Age: 35
End: 2025-05-14
Payer: COMMERCIAL

## 2025-05-14 VITALS
SYSTOLIC BLOOD PRESSURE: 104 MMHG | WEIGHT: 171.2 LBS | BODY MASS INDEX: 24.51 KG/M2 | HEIGHT: 70 IN | DIASTOLIC BLOOD PRESSURE: 68 MMHG

## 2025-05-14 DIAGNOSIS — Z01.411 ENCOUNTER FOR GYNECOLOGICAL EXAMINATION (GENERAL) (ROUTINE) WITH ABNORMAL FINDINGS: Primary | ICD-10-CM

## 2025-05-14 DIAGNOSIS — Z80.3 FAMILY HISTORY OF BREAST CANCER: ICD-10-CM

## 2025-05-14 DIAGNOSIS — Z11.3 ROUTINE SCREENING FOR STI (SEXUALLY TRANSMITTED INFECTION): ICD-10-CM

## 2025-05-14 DIAGNOSIS — R68.82 LOW LIBIDO: ICD-10-CM

## 2025-05-14 DIAGNOSIS — Z01.419 WOMEN'S ANNUAL ROUTINE GYNECOLOGICAL EXAMINATION: ICD-10-CM

## 2025-05-14 PROCEDURE — G0476 HPV COMBO ASSAY CA SCREEN: HCPCS | Performed by: OBSTETRICS & GYNECOLOGY

## 2025-05-14 PROCEDURE — 36415 COLL VENOUS BLD VENIPUNCTURE: CPT

## 2025-05-14 PROCEDURE — G0145 SCR C/V CYTO,THINLAYER,RESCR: HCPCS | Performed by: OBSTETRICS & GYNECOLOGY

## 2025-05-14 PROCEDURE — 87389 HIV-1 AG W/HIV-1&-2 AB AG IA: CPT

## 2025-05-14 PROCEDURE — 87591 N.GONORRHOEAE DNA AMP PROB: CPT | Performed by: OBSTETRICS & GYNECOLOGY

## 2025-05-14 PROCEDURE — 87350 HEPATITIS BE AG IA: CPT

## 2025-05-14 PROCEDURE — S0612 ANNUAL GYNECOLOGICAL EXAMINA: HCPCS | Performed by: OBSTETRICS & GYNECOLOGY

## 2025-05-14 PROCEDURE — 87491 CHLMYD TRACH DNA AMP PROBE: CPT | Performed by: OBSTETRICS & GYNECOLOGY

## 2025-05-14 PROCEDURE — 86780 TREPONEMA PALLIDUM: CPT

## 2025-05-14 RX ORDER — HYDROCHLOROTHIAZIDE 25 MG/1
25 TABLET ORAL DAILY
Qty: 30 TABLET | Refills: 5 | Status: SHIPPED | OUTPATIENT
Start: 2025-05-14

## 2025-05-15 LAB
HBV E AG SERPL QL IA: NORMAL
HIV 1+2 AB+HIV1 P24 AG SERPL QL IA: NORMAL
TREPONEMA PALLIDUM IGG+IGM AB [PRESENCE] IN SERUM OR PLASMA BY IMMUNOASSAY: NORMAL

## 2025-05-16 ENCOUNTER — RESULTS FOLLOW-UP (OUTPATIENT)
Dept: GYNECOLOGY | Facility: CLINIC | Age: 35
End: 2025-05-16

## 2025-05-16 LAB
C TRACH DNA SPEC QL NAA+PROBE: NEGATIVE
HPV HR 12 DNA CVX QL NAA+PROBE: POSITIVE
HPV16 DNA CVX QL NAA+PROBE: NEGATIVE
HPV18 DNA CVX QL NAA+PROBE: NEGATIVE
N GONORRHOEA DNA SPEC QL NAA+PROBE: NEGATIVE

## 2025-05-19 ENCOUNTER — TELEPHONE (OUTPATIENT)
Age: 35
End: 2025-05-19

## 2025-05-19 ENCOUNTER — TELEPHONE (OUTPATIENT)
Dept: FAMILY MEDICINE CLINIC | Facility: CLINIC | Age: 35
End: 2025-05-19

## 2025-05-19 NOTE — TELEPHONE ENCOUNTER
Patient was scheduled with you today 5/19 for a physical , she has off on Friday 5/23, would you be ok with using the 9am same day spots,if so I can have rossi put patient in as it says your at your max for that day.

## 2025-06-03 ENCOUNTER — HOSPITAL ENCOUNTER (OUTPATIENT)
Dept: RADIOLOGY | Facility: IMAGING CENTER | Age: 35
Discharge: HOME | End: 2025-06-03
Payer: COMMERCIAL

## 2025-06-03 VITALS — HEIGHT: 70 IN | BODY MASS INDEX: 24.48 KG/M2 | WEIGHT: 171 LBS

## 2025-06-03 DIAGNOSIS — Z80.3 FAMILY HISTORY OF BREAST CANCER: ICD-10-CM

## 2025-06-03 PROCEDURE — 77067 SCR MAMMO BI INCL CAD: CPT

## 2025-06-03 PROCEDURE — 77063 BREAST TOMOSYNTHESIS BI: CPT

## 2025-06-10 ENCOUNTER — OFFICE VISIT (OUTPATIENT)
Dept: FAMILY MEDICINE CLINIC | Facility: CLINIC | Age: 35
End: 2025-06-10
Payer: COMMERCIAL

## 2025-06-10 VITALS
BODY MASS INDEX: 24.08 KG/M2 | WEIGHT: 172 LBS | RESPIRATION RATE: 17 BRPM | HEIGHT: 71 IN | HEART RATE: 75 BPM | DIASTOLIC BLOOD PRESSURE: 70 MMHG | TEMPERATURE: 97.9 F | OXYGEN SATURATION: 99 % | SYSTOLIC BLOOD PRESSURE: 122 MMHG

## 2025-06-10 DIAGNOSIS — R35.0 URINARY FREQUENCY: ICD-10-CM

## 2025-06-10 DIAGNOSIS — Z23 ENCOUNTER FOR IMMUNIZATION: ICD-10-CM

## 2025-06-10 DIAGNOSIS — K21.9 GASTROESOPHAGEAL REFLUX DISEASE WITHOUT ESOPHAGITIS: ICD-10-CM

## 2025-06-10 DIAGNOSIS — B02.30 HERPES ZOSTER OPHTHALMICUS OF LEFT EYE: ICD-10-CM

## 2025-06-10 DIAGNOSIS — N30.00 ACUTE CYSTITIS WITHOUT HEMATURIA: ICD-10-CM

## 2025-06-10 DIAGNOSIS — Z00.00 ANNUAL PHYSICAL EXAM: Primary | ICD-10-CM

## 2025-06-10 DIAGNOSIS — B02.39 SHINGLES OF EYELID: ICD-10-CM

## 2025-06-10 LAB
SL AMB  POCT GLUCOSE, UA: ABNORMAL
SL AMB LEUKOCYTE ESTERASE,UA: ABNORMAL
SL AMB POCT BILIRUBIN,UA: ABNORMAL
SL AMB POCT BLOOD,UA: ABNORMAL
SL AMB POCT CLARITY,UA: ABNORMAL
SL AMB POCT COLOR,UA: YELLOW
SL AMB POCT KETONES,UA: ABNORMAL
SL AMB POCT NITRITE,UA: ABNORMAL
SL AMB POCT PH,UA: 7
SL AMB POCT SPECIFIC GRAVITY,UA: 1.01
SL AMB POCT URINE PROTEIN: ABNORMAL
SL AMB POCT UROBILINOGEN: ABNORMAL

## 2025-06-10 PROCEDURE — 90715 TDAP VACCINE 7 YRS/> IM: CPT

## 2025-06-10 PROCEDURE — 87086 URINE CULTURE/COLONY COUNT: CPT | Performed by: NURSE PRACTITIONER

## 2025-06-10 PROCEDURE — 99395 PREV VISIT EST AGE 18-39: CPT | Performed by: NURSE PRACTITIONER

## 2025-06-10 PROCEDURE — 90471 IMMUNIZATION ADMIN: CPT

## 2025-06-10 PROCEDURE — 81002 URINALYSIS NONAUTO W/O SCOPE: CPT | Performed by: NURSE PRACTITIONER

## 2025-06-10 RX ORDER — NITROFURANTOIN 25; 75 MG/1; MG/1
100 CAPSULE ORAL 2 TIMES DAILY
Qty: 10 CAPSULE | Refills: 0 | Status: SHIPPED | OUTPATIENT
Start: 2025-06-10 | End: 2025-06-15

## 2025-06-10 NOTE — PROGRESS NOTES
Adult Annual Physical  Name: Cinthia Ortiz      : 1990      MRN: [id number]  Encounter Provider: DIANA Winston  Encounter Date: 6/10/2025   Encounter department: [facility]    :  Assessment & Plan  Annual physical exam         Urinary frequency    Orders:    POCT urine dip    Urine culture    Gastroesophageal reflux disease without esophagitis  Stable  Cont meds         Shingles of eyelid  Meds as ordered       Herpes zoster ophthalmicus of left eye  Meds as ordered    Orders:    CBC and differential; Future    Comprehensive metabolic panel; Future    Encounter for immunization    Orders:    TDAP VACCINE GREATER THAN OR EQUAL TO 6YO IM    Acute cystitis without hematuria    Orders:    nitrofurantoin (MACROBID) 100 mg capsule; Take 1 capsule (100 mg total) by mouth 2 (two) times a day for 5 days        Preventive Screenings:  - Diabetes Screening: screening up-to-date  - Cholesterol Screening: screening up-to-date   - Hepatitis C screening: screening up-to-date   - HIV screening: screening up-to-date   - Cervical cancer screening: screening up-to-date   - Colon cancer screening: screening not indicated   - Lung cancer screening: screening not indicated     Counseling/Anticipatory Guidance:    - Dental health: discussed importance of regular tooth brushing, flossing, and dental visits.   - Diet: discussed recommendations for a healthy/well-balanced diet.   - Exercise: the importance of regular exercise/physical activity was discussed. Recommend exercise 3-5 times per week for at least 30 minutes.   - Injury prevention: discussed safety/seat belts, safety helmets, smoke detectors, carbon monoxide detectors, and smoking near bedding or upholstery.          History of Present Illness     Adult Annual Physical:  Patient presents for annual physical. Here for wellness exam  .     Diet and Physical Activity:  - Diet/Nutrition: no special diet.  - Exercise: no formal exercise.    General  "Health:  - Sleep: sleeps well.  - Hearing: normal hearing bilateral ears.  - Vision: wears glasses.  - Dental: regular dental visits and brushes teeth once daily.    /GYN Health:  - Follows with GYN: yes.   - Menopause: premenopausal.   - History of STDs: no  - Contraception: barrier methods.      Advanced Care Planning:  - Has an advanced directive?: no    - Has a durable medical POA?: no    - ACP document given to patient?: no      Review of Systems   Constitutional:  Negative for fatigue and fever.   HENT:  Negative for congestion, postnasal drip and rhinorrhea.    Eyes:  Positive for visual disturbance. Negative for photophobia.   Respiratory:  Negative for cough, shortness of breath and wheezing.    Cardiovascular:  Negative for chest pain and palpitations.   Gastrointestinal:  Negative for constipation, diarrhea, nausea and vomiting.   Genitourinary:  Positive for frequency. Negative for dyspareunia and hematuria.   Musculoskeletal:  Negative for arthralgias and myalgias.   Skin:  Negative for rash.   Neurological:  Negative for dizziness, light-headedness and headaches.   Hematological:  Negative for adenopathy.   Psychiatric/Behavioral:  Negative for dysphoric mood and sleep disturbance. The patient is not nervous/anxious.          Objective   /70 (BP Location: Left arm, Patient Position: Sitting, Cuff Size: Standard)   Pulse 75   Temp 97.9 °F (36.6 °C) (Tympanic)   Resp 17   Ht 5' 11.01\" (1.804 m)   Wt 78 kg (172 lb)   LMP 05/16/2025 (Approximate)   SpO2 99%   BMI 23.98 kg/m²     Physical Exam  Vitals and nursing note reviewed.   Constitutional:       Appearance: Normal appearance.   HENT:      Head: Normocephalic and atraumatic.      Right Ear: Tympanic membrane, ear canal and external ear normal.      Left Ear: Tympanic membrane, ear canal and external ear normal.      Nose: Nose normal.      Mouth/Throat:      Mouth: Mucous membranes are moist.     Eyes:      Conjunctiva/sclera: " Conjunctivae normal.       Cardiovascular:      Rate and Rhythm: Normal rate and regular rhythm.      Heart sounds: Normal heart sounds.   Pulmonary:      Effort: Pulmonary effort is normal.   Abdominal:      Palpations: Abdomen is soft.     Musculoskeletal:         General: Normal range of motion.      Cervical back: Normal range of motion and neck supple.     Skin:     General: Skin is warm and dry.      Capillary Refill: Capillary refill takes less than 2 seconds.     Neurological:      General: No focal deficit present.      Mental Status: She is alert.     Psychiatric:         Mood and Affect: Mood normal.         Thought Content: Thought content normal.         Judgment: Judgment normal.

## 2025-06-10 NOTE — PATIENT INSTRUCTIONS
"Patient Education     Routine physical for adults   The Basics   Written by the doctors and editors at [facility]   What is a physical? -- A physical is a routine visit, or \"check-up,\" with your doctor. You might also hear it called a \"wellness visit\" or \"preventive visit.\"  During each visit, the doctor will:   Ask about your physical and mental health   Ask about your habits, behaviors, and lifestyle   Do an exam   Give you vaccines if needed   Talk to you about any medicines you take   Give advice about your health   Answer your questions  Getting regular check-ups is an important part of taking care of your health. It can help your doctor find and treat any problems you have. But it's also important for preventing health problems.  A routine physical is different from a \"sick visit.\" A sick visit is when you see a doctor because of a health concern or problem. Since physicals are scheduled ahead of time, you can think about what you want to ask the doctor.  How often should I get a physical? -- It depends on your age and health. In general, for people age 21 years and older:   If you are younger than 50 years, you might be able to get a physical every 3 years.   If you are 50 years or older, your doctor might recommend a physical every year.  If you have an ongoing health condition, like diabetes or high blood pressure, your doctor will probably want to see you more often.  What happens during a physical? -- In general, each visit will include:   Physical exam - The doctor or nurse will check your height, weight, heart rate, and blood pressure. They will also look at your eyes and ears. They will ask about how you are feeling and whether you have any symptoms that bother you.   Medicines - It's a good idea to bring a list of all the medicines you take to each doctor visit. Your doctor will talk to you about your medicines and answer any questions. Tell them if you are having any side effects that bother you. You " "should also tell them if you are having trouble paying for any of your medicines.   Habits and behaviors - This includes:   Your diet   Your exercise habits   Whether you smoke, drink alcohol, or use drugs   Whether you are sexually active   Whether you feel safe at home  Your doctor will talk to you about things you can do to improve your health and lower your risk of health problems. They will also offer help and support. For example, if you want to quit smoking, they can give you advice and might prescribe medicines. If you want to improve your diet or get more physical activity, they can help you with this, too.   Lab tests, if needed - The tests you get will depend on your age and situation. For example, your doctor might want to check your:   Cholesterol   Blood sugar   Iron level   Vaccines - The recommended vaccines will depend on your age, health, and what vaccines you already had. Vaccines are very important because they can prevent certain serious or deadly infections.   Discussion of screening - \"Screening\" means checking for diseases or other health problems before they cause symptoms. Your doctor can recommend screening based on your age, risk, and preferences. This might include tests to check for:   Cancer, such as breast, prostate, cervical, ovarian, colorectal, prostate, lung, or skin cancer   Sexually transmitted infections, such as chlamydia and gonorrhea   Mental health conditions like depression and anxiety  Your doctor will talk to you about the different types of screening tests. They can help you decide which screenings to have. They can also explain what the results might mean.   Answering questions - The physical is a good time to ask the doctor or nurse questions about your health. If needed, they can refer you to other doctors or specialists, too.  Adults older than 65 years often need other care, too. As you get older, your doctor will talk to you about:   How to prevent falling at " home   Hearing or vision tests   Memory testing   How to take your medicines safely   Making sure that you have the help and support you need at home  All topics are updated as new evidence becomes available and our peer review process is complete.  This topic retrieved from [employer] on: May 02, 2024.  Topic 590533 Version 1.0  Release: 32.4.3 - C32.122  © 2024 UpToDate, Inc. and/or its affiliates. All rights reserved.  Consumer Information Use and Disclaimer   Disclaimer: This generalized information is a limited summary of diagnosis, treatment, and/or medication information. It is not meant to be comprehensive and should be used as a tool to help the user understand and/or assess potential diagnostic and treatment options. It does NOT include all information about conditions, treatments, medications, side effects, or risks that may apply to a specific patient. It is not intended to be medical advice or a substitute for the medical advice, diagnosis, or treatment of a health care provider based on the health care provider's examination and assessment of a patient's specific and unique circumstances. Patients must speak with a health care provider for complete information about their health, medical questions, and treatment options, including any risks or benefits regarding use of medications. This information does not endorse any treatments or medications as safe, effective, or approved for treating a specific patient. UpToDate, Inc. and its affiliates disclaim any warranty or liability relating to this information or the use thereof.The use of this information is governed by the Terms of Use, available at https://www.woltersQazzowuwer.com/en/know/clinical-effectiveness-terms. 2024© UpToDate, Inc. and its affiliates and/or licensors. All rights reserved.  Copyright   © 2024 UpToDate, Inc. and/or its affiliates. All rights reserved.

## 2025-06-10 NOTE — ASSESSMENT & PLAN NOTE
Meds as ordered    Orders:    CBC and differential; Future    Comprehensive metabolic panel; Future

## 2025-06-11 LAB — BACTERIA UR CULT: NORMAL

## 2025-06-15 DIAGNOSIS — R60.0 BILATERAL LEG EDEMA: ICD-10-CM

## 2025-06-15 RX ORDER — HYDROCHLOROTHIAZIDE 25 MG/1
25 TABLET ORAL DAILY
Qty: 90 TABLET | Refills: 1 | Status: SHIPPED | OUTPATIENT
Start: 2025-06-15

## 2025-06-25 ENCOUNTER — PROCEDURE VISIT (OUTPATIENT)
Dept: GYNECOLOGY | Facility: CLINIC | Age: 35
End: 2025-06-25
Payer: COMMERCIAL

## 2025-06-25 VITALS — SYSTOLIC BLOOD PRESSURE: 108 MMHG | DIASTOLIC BLOOD PRESSURE: 76 MMHG | WEIGHT: 170.6 LBS | BODY MASS INDEX: 23.79 KG/M2

## 2025-06-25 DIAGNOSIS — Z01.818 PRE-OP TESTING: ICD-10-CM

## 2025-06-25 DIAGNOSIS — B97.7 HPV (HUMAN PAPILLOMA VIRUS) INFECTION: Primary | ICD-10-CM

## 2025-06-25 LAB — SL AMB POCT URINE HCG: NORMAL

## 2025-06-25 PROCEDURE — 57456 ENDOCERV CURETTAGE W/SCOPE: CPT | Performed by: OBSTETRICS & GYNECOLOGY

## 2025-06-25 PROCEDURE — 81025 URINE PREGNANCY TEST: CPT | Performed by: OBSTETRICS & GYNECOLOGY

## 2025-06-25 PROCEDURE — 88305 TISSUE EXAM BY PATHOLOGIST: CPT | Performed by: PATHOLOGY

## 2025-06-25 NOTE — PROGRESS NOTES
Colposcopy    Date/Time: 6/25/2025 2:30 PM    Performed by: DIANA Baugh  Authorized by: DIANA Baugh    Other Assisting Provider: No    Verbal consent obtained?: Yes    Risks and benefits: Risks, benefits and alternatives were discussed (infection, pain, bleeding, injury to surrounding structures, insufficient sampling requiring repeat procedure)    Patient states understanding of procedure being performed: Yes    Patient's understanding of procedure matches consent: Yes    Procedure consent matches procedure scheduled: Yes    Required items: Required blood products, implants, devices and special equipment available    Patient identity confirmed:  Verbally with patient  Pre-procedure:     Prepped with: acetic acid    Indication:     Indications: normal pap, +HPV, -16, -18.  Procedure:     Procedure: Colposcopy w/ endocervical curettage      Under satisfactory analgesia the patient was prepped and draped in the dorsal lithotomy position: yes      [place] speculum was placed in the vagina: yes      Endocervix was curetted using a Kevorkian curette: yes      Specimen(s) to pathology: yes    Post-procedure:     Findings comment:  Pink epithelium    Impression: Low grade cervical dysplasia      Patient tolerance of procedure:  Tolerated well, no immediate complications  Comments:      HPV reviewed. Will repeat pap in 1 year.

## 2025-06-30 PROCEDURE — 88305 TISSUE EXAM BY PATHOLOGIST: CPT | Performed by: PATHOLOGY

## 2025-07-01 ENCOUNTER — RESULTS FOLLOW-UP (OUTPATIENT)
Dept: GYNECOLOGY | Facility: CLINIC | Age: 35
End: 2025-07-01

## 2025-08-12 ENCOUNTER — OFFICE VISIT (OUTPATIENT)
Dept: FAMILY MEDICINE CLINIC | Facility: CLINIC | Age: 35
End: 2025-08-12
Payer: COMMERCIAL

## 2025-08-12 PROBLEM — F41.1 GENERALIZED ANXIETY DISORDER: Status: ACTIVE | Noted: 2025-08-12

## 2025-08-12 PROBLEM — F32.0 CURRENT MILD EPISODE OF MAJOR DEPRESSIVE DISORDER WITHOUT PRIOR EPISODE (HCC): Status: ACTIVE | Noted: 2025-08-12

## (undated) DEVICE — GLOVE SRG LF STRL BGL SKNSNS 6.5 PF

## (undated) DEVICE — POV-IOD SOLUTION 4OZ BT

## (undated) DEVICE — STIRRUP STRAP ADULT DISP

## (undated) DEVICE — ABDOMINAL PAD: Brand: DERMACEA

## (undated) DEVICE — LIGHT GLOVE GREEN

## (undated) DEVICE — GLOVE SRG BIOGEL 7

## (undated) DEVICE — PADDING CAST 6IN COTTON STRL

## (undated) DEVICE — SPONGE LAP 18 X 18 IN STRL RFD

## (undated) DEVICE — ACE WRAP 6 IN UNSTERILE

## (undated) DEVICE — DRAPE SHEET THREE QUARTER

## (undated) DEVICE — BETHLEHEM UNIVERSAL  ARTHRO PK: Brand: CARDINAL HEALTH

## (undated) DEVICE — WEBRIL 6 IN UNSTERILE

## (undated) DEVICE — 3M™ TEGADERM™ CHG DRESSING 25/CARTON 4 CARTONS/CASE 1659: Brand: TEGADERM™

## (undated) DEVICE — GLOVE SRG BIOGEL 8.5

## (undated) DEVICE — Device

## (undated) DEVICE — EXTREMITY DRAPE W/ARMBOARD COVERS: Brand: CONVERTORS

## (undated) DEVICE — SUT VICRYL 2-0 SH 27 IN UNDYED J417H

## (undated) DEVICE — GLOVE SRG BIOGEL 6.5

## (undated) DEVICE — PAD CAST 6 IN COTTON NON STERILE

## (undated) DEVICE — SYRINGE 30ML LL

## (undated) DEVICE — GLOVE INDICATOR PI UNDERGLOVE SZ 8.5 BLUE

## (undated) DEVICE — SUT MONOCRYL 4-0 PS-2 27 IN Y426H

## (undated) DEVICE — COBAN 4 IN STERILE

## (undated) DEVICE — BLADE SHAVER CUTTER BN 4MM 13CM COOLCUT

## (undated) DEVICE — SURGIFOAM 8.5 X 12.5

## (undated) DEVICE — 4-PORT MANIFOLD: Brand: NEPTUNE 2

## (undated) DEVICE — STERILE POLYISOPRENE POWDER-FREE SURGICAL GLOVES WITH EMOLLIENT COATING: Brand: PROTEXIS

## (undated) DEVICE — 3M™ STERI-STRIP™ REINFORCED ADHESIVE SKIN CLOSURES, R1547, 1/2 IN X 4 IN (12 MM X 100 MM), 6 STRIPS/ENVELOPE: Brand: 3M™ STERI-STRIP™

## (undated) DEVICE — INTENT TO BE USED WITH SUTURE MATERIAL FOR TISSUE CLOSURE: Brand: RICHARD-ALLAN®  NEEDLE 1/2 CIRCLE REVERSE CUTTING

## (undated) DEVICE — BRUSH EZ SCRUB PCMX W/NAIL CLEANER

## (undated) DEVICE — 3M™ IOBAN™ 2 ANTIMICROBIAL INCISE DRAPE 6650EZ: Brand: IOBAN™ 2

## (undated) DEVICE — CUFF TOURNIQUET 30 X 4 IN QUICK CONNECT DISP 1BLA

## (undated) DEVICE — MEDI-VAC YANKAUER SUCTION HANDLE W/BULBOUS AND CONTROL VENT: Brand: CARDINAL HEALTH

## (undated) DEVICE — 3M™ STERI-DRAPE™ U-DRAPE 1015: Brand: STERI-DRAPE™

## (undated) DEVICE — SURGIFOAM 7 X 12 SPONGE ABS

## (undated) DEVICE — SUT VICRYL 6-0 P-3 18 IN J492G

## (undated) DEVICE — TUBING ARTHROSCOPIC WAVE  MAIN PUMP

## (undated) DEVICE — IMPERVIOUS STOCKINETTE: Brand: DEROYAL

## (undated) DEVICE — ARTHROSCOPY FLOOR MAT

## (undated) DEVICE — BOWL: 16OZ PEELPOUCH 75/CS 16/PLT: Brand: MEDEGEN MEDICAL PRODUCTS, LLC

## (undated) DEVICE — MEDI-VAC YANK SUCT HNDL W/TPRD BULBOUS TIP: Brand: CARDINAL HEALTH

## (undated) DEVICE — STOCKINETTE,IMPERVIOUS,12X48,STERILE: Brand: MEDLINE

## (undated) DEVICE — GAUZE SPONGES,16 PLY: Brand: CURITY

## (undated) DEVICE — SUT VICRYL 0 CT-1 36 IN J946H

## (undated) DEVICE — BETHLEHEM UNIV TOTAL KNEE, KIT: Brand: CARDINAL HEALTH

## (undated) DEVICE — SUT CHROMIC 3-0 SH 27 IN G122H

## (undated) DEVICE — DISPOSABLE OR TOWEL: Brand: CARDINAL HEALTH

## (undated) DEVICE — CHLORAPREP HI-LITE 26ML ORANGE

## (undated) DEVICE — 3M™ TEGADERM™ TRANSPARENT FILM DRESSING FRAME STYLE, 1628, 6 IN X 8 IN (15 CM X 20 CM), 10/CT 8CT/CASE: Brand: 3M™ TEGADERM™

## (undated) DEVICE — SCD SEQUENTIAL COMPRESSION COMFORT SLEEVE MEDIUM KNEE LENGTH: Brand: KENDALL SCD

## (undated) DEVICE — GLOVE INDICATOR PI UNDERGLOVE SZ 7 BLUE

## (undated) DEVICE — INTENDED FOR TISSUE SEPARATION, AND OTHER PROCEDURES THAT REQUIRE A SHARP SURGICAL BLADE TO PUNCTURE OR CUT.: Brand: BARD-PARKER ® SAFETYLOCK CARBON RIB-BACK BLADES

## (undated) DEVICE — SUT 2 FIBERLOOP AR-7234

## (undated) DEVICE — INTENDED FOR TISSUE SEPARATION, AND OTHER PROCEDURES THAT REQUIRE A SHARP SURGICAL BLADE TO PUNCTURE OR CUT.: Brand: BARD-PARKER SAFETY BLADES SIZE 15, STERILE

## (undated) DEVICE — OCCLUSIVE GAUZE STRIP,3% BISMUTH TRIBROMOPHENATE IN PETROLATUM BLEND: Brand: XEROFORM

## (undated) DEVICE — KIT DISP 3MM PLLA SHOULDER

## (undated) DEVICE — SURGICAL GOWN, XL SMARTSLEEVE: Brand: CONVERTORS

## (undated) DEVICE — NEPTUNE E-SEP SMOKE EVACUATION PENCIL, COATED, 70MM BLADE, PUSH BUTTON SWITCH: Brand: NEPTUNE E-SEP

## (undated) DEVICE — ADHESIVE SKIN HIGH VISCOSITY EXOFIN 1ML

## (undated) DEVICE — SUTURETAPE FIBERLOOP W/NDL WHITE/BL AR-7534

## (undated) DEVICE — BLADE SHAVER DISSECTOR  4MM 13CM CRV COOLCUT

## (undated) DEVICE — COBAN 6 IN STERILE

## (undated) DEVICE — GLOVE SRG LF STRL BGL SKNSNS 8.5 PF

## (undated) DEVICE — NEEDLE BLUNT 18 G X 1 1/2IN